# Patient Record
Sex: FEMALE | Race: WHITE | NOT HISPANIC OR LATINO | Employment: FULL TIME | URBAN - METROPOLITAN AREA
[De-identification: names, ages, dates, MRNs, and addresses within clinical notes are randomized per-mention and may not be internally consistent; named-entity substitution may affect disease eponyms.]

---

## 2017-07-09 ENCOUNTER — HOSPITAL ENCOUNTER (EMERGENCY)
Facility: CLINIC | Age: 27
Discharge: HOME | End: 2017-07-09
Attending: EMERGENCY MEDICINE | Admitting: EMERGENCY MEDICINE
Payer: COMMERCIAL

## 2017-07-09 VITALS
TEMPERATURE: 98.3 F | DIASTOLIC BLOOD PRESSURE: 66 MMHG | OXYGEN SATURATION: 98 % | RESPIRATION RATE: 20 BRPM | HEART RATE: 75 BPM | SYSTOLIC BLOOD PRESSURE: 109 MMHG

## 2017-07-09 DIAGNOSIS — R11.2 NON-INTRACTABLE VOMITING WITH NAUSEA, UNSPECIFIED VOMITING TYPE: ICD-10-CM

## 2017-07-09 DIAGNOSIS — T50.905A ADVERSE DRUG REACTION, INITIAL ENCOUNTER: ICD-10-CM

## 2017-07-09 LAB
ALBUMIN UR-MCNC: NEGATIVE MG/DL
ANION GAP SERPL CALCULATED.3IONS-SCNC: 11 MMOL/L (ref 3–14)
APPEARANCE UR: ABNORMAL
BACTERIA #/AREA URNS HPF: ABNORMAL /HPF
BILIRUB UR QL STRIP: NEGATIVE
BUN SERPL-MCNC: 12 MG/DL (ref 7–30)
CALCIUM SERPL-MCNC: 9.3 MG/DL (ref 8.5–10.1)
CHLORIDE SERPL-SCNC: 105 MMOL/L (ref 94–109)
CO2 BLDCOV-SCNC: 23 MMOL/L (ref 21–28)
CO2 SERPL-SCNC: 21 MMOL/L (ref 20–32)
COLOR UR AUTO: YELLOW
CREAT SERPL-MCNC: 0.9 MG/DL (ref 0.52–1.04)
GFR SERPL CREATININE-BSD FRML MDRD: 75 ML/MIN/1.7M2
GLUCOSE SERPL-MCNC: 87 MG/DL (ref 70–99)
GLUCOSE UR STRIP-MCNC: NEGATIVE MG/DL
HCG SERPL QL: NEGATIVE
HGB UR QL STRIP: NEGATIVE
KETONES UR STRIP-MCNC: NEGATIVE MG/DL
LACTATE BLD-SCNC: 1.4 MMOL/L (ref 0.7–2.1)
LACTATE BLD-SCNC: 2.7 MMOL/L (ref 0.7–2.1)
LEUKOCYTE ESTERASE UR QL STRIP: ABNORMAL
MUCOUS THREADS #/AREA URNS LPF: PRESENT /LPF
NITRATE UR QL: NEGATIVE
PCO2 BLDV: 44 MM HG (ref 40–50)
PH BLDV: 7.33 PH (ref 7.32–7.43)
PH UR STRIP: 5 PH (ref 5–7)
PO2 BLDV: 27 MM HG (ref 25–47)
POTASSIUM SERPL-SCNC: 3.9 MMOL/L (ref 3.4–5.3)
RBC #/AREA URNS AUTO: 3 /HPF (ref 0–2)
SAO2 % BLDV FROM PO2: 45 %
SODIUM SERPL-SCNC: 137 MMOL/L (ref 133–144)
SP GR UR STRIP: 1.02 (ref 1–1.03)
SQUAMOUS #/AREA URNS AUTO: 2 /HPF (ref 0–1)
URN SPEC COLLECT METH UR: ABNORMAL
UROBILINOGEN UR STRIP-MCNC: 0 MG/DL (ref 0–2)
WBC #/AREA URNS AUTO: 9 /HPF (ref 0–2)

## 2017-07-09 PROCEDURE — 99284 EMERGENCY DEPT VISIT MOD MDM: CPT | Mod: 25

## 2017-07-09 PROCEDURE — 25000128 H RX IP 250 OP 636: Performed by: EMERGENCY MEDICINE

## 2017-07-09 PROCEDURE — 80048 BASIC METABOLIC PNL TOTAL CA: CPT | Performed by: EMERGENCY MEDICINE

## 2017-07-09 PROCEDURE — 83605 ASSAY OF LACTIC ACID: CPT | Mod: 91 | Performed by: EMERGENCY MEDICINE

## 2017-07-09 PROCEDURE — 81001 URINALYSIS AUTO W/SCOPE: CPT | Performed by: EMERGENCY MEDICINE

## 2017-07-09 PROCEDURE — 84703 CHORIONIC GONADOTROPIN ASSAY: CPT | Performed by: EMERGENCY MEDICINE

## 2017-07-09 PROCEDURE — 96374 THER/PROPH/DIAG INJ IV PUSH: CPT

## 2017-07-09 PROCEDURE — 82803 BLOOD GASES ANY COMBINATION: CPT

## 2017-07-09 PROCEDURE — 83605 ASSAY OF LACTIC ACID: CPT

## 2017-07-09 PROCEDURE — 96361 HYDRATE IV INFUSION ADD-ON: CPT

## 2017-07-09 RX ORDER — ONDANSETRON 4 MG/1
4 TABLET, ORALLY DISINTEGRATING ORAL EVERY 8 HOURS PRN
Qty: 10 TABLET | Refills: 0 | Status: SHIPPED | OUTPATIENT
Start: 2017-07-09 | End: 2017-07-12

## 2017-07-09 RX ORDER — ONDANSETRON 2 MG/ML
4 INJECTION INTRAMUSCULAR; INTRAVENOUS
Status: COMPLETED | OUTPATIENT
Start: 2017-07-09 | End: 2017-07-09

## 2017-07-09 RX ORDER — SODIUM CHLORIDE 9 MG/ML
1000 INJECTION, SOLUTION INTRAVENOUS CONTINUOUS
Status: DISCONTINUED | OUTPATIENT
Start: 2017-07-09 | End: 2017-07-09 | Stop reason: HOSPADM

## 2017-07-09 RX ORDER — LIDOCAINE 40 MG/G
CREAM TOPICAL
Status: DISCONTINUED | OUTPATIENT
Start: 2017-07-09 | End: 2017-07-09 | Stop reason: HOSPADM

## 2017-07-09 RX ADMIN — SODIUM CHLORIDE 1000 ML: 9 INJECTION, SOLUTION INTRAVENOUS at 09:23

## 2017-07-09 RX ADMIN — ONDANSETRON 4 MG: 2 INJECTION INTRAMUSCULAR; INTRAVENOUS at 09:27

## 2017-07-09 ASSESSMENT — ENCOUNTER SYMPTOMS
VOMITING: 1
NAUSEA: 1
ABDOMINAL PAIN: 1

## 2017-07-09 NOTE — DISCHARGE INSTRUCTIONS
Discharge Instructions  Vomiting    You have been seen today for vomiting. This is usually caused by a virus, but some bacteria, parasites, medicines or other medical conditions can cause similar symptoms. At this time your doctor does not find that your vomiting is a sign of anything dangerous or life-threatening. However, sometimes the signs of serious illness do not show up right away. If you have new or worse symptoms, you may need to be seen again in the emergency department or by your primary doctor. Remember that serious problems like appendicitis can start as vomiting.     Return to the Emergency Department if:    You keep throwing up and you are not able to keep liquids down.     You feel you are getting dehydrated, such as being very thirsty, not urinating at least every 8-12 hours, or feeling faint or lightheaded.     You develop a new fever, or your fever continues for more than 2 days.     You have belly pain that seems worse than cramps, is in one spot, or is getting worse over time.     You have blood in your vomit or stools.     You feel very weak.    You are not starting to improve within 24 hours of your visit here.     What can I do to help myself?    The most important thing to do is to drink clear liquids. If you have been vomiting a lot, it is best to have only small, frequent sips of liquids. Drinking too much at once may cause more vomiting. If you are vomiting often, you must replace minerals, sodium and potassium lost with your illness. Pedialyte  and sports drinks can help you replace these minerals. You can also drink clear liquids such as water, weak tea, apple juice, and 7-Up . Avoid acid liquids (orange), caffeine (coffee) or alcohol. Do not drink milk until you no longer have diarrhea.     After liquids are staying down, you may start eating mild foods. Soda crackers, toast, plain noodles, gelatin, applesauce and bananas are good first choices. Avoid foods that have acid, are spicy,  fatty or have a lot of fiber (such as meats, coarse grains, vegetables). You may start eating these foods again in about 3 days when you are better.     Sometimes treatment includes prescription medicine to prevent nausea and vomiting. If your doctor prescribes these for you, take them as directed.     Don t take ibuprofen, or other nonsteroidal anti-inflammatory medicines without checking with your healthcare provider.   If you were given a prescription for medicine here today, be sure to read all of the information (including the package insert) that comes with your prescription.  This will include important information about the medicine, its side effects, and any warnings that you need to know about.  The pharmacist who fills the prescription can provide more information and answer questions you may have about the medicine.  If you have questions or concerns that the pharmacist cannot address, please call or return to the Emergency Department.       Opioid Medication Information    Pain medications are among the most commonly prescribed medicines, so we are including this information for all our patients. If you did not receive pain medication or get a prescription for pain medicine, you can ignore it.     You may have been given a prescription for an opioid (narcotic) pain medicine and/or have received a pain medicine while here in the Emergency Department. These medicines can make you drowsy or impaired. You must not drive, operate dangerous equipment, or engage in any other dangerous activities while taking these medications. If you drive while taking these medications, you could be arrested for DUI, or driving under the influence. Do not drink any alcohol while you are taking these medications.     Opioid pain medications can cause addiction. If you have a history of chemical dependency of any type, you are at a higher risk of becoming addicted to pain medications.  Only take these prescribed medications to  treat your pain when all other options have been tried. Take it for as short a time and as few doses as possible. Store your pain pills in a secure place, as they are frequently stolen and provide a dangerous opportunity for children or visitors in your house to start abusing these powerful medications. We will not replace any lost or stolen medicine.  As soon as your pain is better, you should flush all your remaining medication.     Many prescription pain medications contain Tylenol  (acetaminophen), including Vicodin , Tylenol #3 , Norco , Lortab , and Percocet .  You should not take any extra pills of Tylenol  if you are using these prescription medications or you can get very sick.  Do not ever take more than 3000 mg of acetaminophen in any 24 hour period.    All opioids tend to cause constipation. Drink plenty of water and eat foods that have a lot of fiber, such as fruits, vegetables, prune juice, apple juice and high fiber cereal.  Take a laxative if you don t move your bowels at least every other day. Miralax , Milk of Magnesia, Colace , or Senna  can be used to keep you regular.      Remember that you can always come back to the Emergency Department if you are not able to see your regular doctor in the amount of time listed above, if you get any new symptoms, or if there is anything that worries you.    DISCONTINUE BACTRIM.

## 2017-07-09 NOTE — ED AVS SNAPSHOT
[facility] Emergency Department    [address]    [facility] [address]    Phone:  [phone]    Fax:  [phone]                                       Karen Buenrostro   MRN: [id number]    Department:  [facility] Emergency Department   Date of Visit:  7/9/2017           After Visit Summary Signature Page     I have received my discharge instructions, and my questions have been answered. I have discussed any challenges I see with this plan with the nurse or doctor.    ..........................................................................................................................................  Patient/Patient Representative Signature      ..........................................................................................................................................  Patient Representative Print Name and Relationship to Patient    ..................................................               ................................................  Date                                            Time    ..........................................................................................................................................  Reviewed by Signature/Title    ...................................................              ..............................................  Date                                                            Time

## 2017-07-09 NOTE — ED AVS SNAPSHOT
[facility] Emergency Department    [address] [place] [address]    Phone:  [phone]    Fax:  [phone]                                       Karen Buenrostro   MRN: [id number]    Department:  [facility] Emergency Department   Date of Visit:  7/9/2017           Patient Information     Date Of Birth          1990        Your diagnoses for this visit were:     Non-intractable vomiting with nausea, unspecified vomiting type     Adverse drug reaction, initial encounter        You were seen by Charan Steward MD.      Follow-up Information     Follow up with Ashley Head MD In 3 days.    Specialties:  Internal Medicine, Pediatrics    Why:  As needed    Contact information:    [facility] [address] [place] [address]  [phone]          Discharge Instructions         Discharge Instructions  Vomiting    You have been seen today for vomiting. This is usually caused by a virus, but some bacteria, parasites, medicines or other medical conditions can cause similar symptoms. At this time your doctor does not find that your vomiting is a sign of anything dangerous or life-threatening. However, sometimes the signs of serious illness do not show up right away. If you have new or worse symptoms, you may need to be seen again in the emergency department or by your primary doctor. Remember that serious problems like appendicitis can start as vomiting.     Return to the Emergency Department if:    You keep throwing up and you are not able to keep liquids down.     You feel you are getting dehydrated, such as being very thirsty, not urinating at least every 8-12 hours, or feeling faint or lightheaded.     You develop a new fever, or your fever continues for more than 2 days.     You have belly pain that seems worse than cramps, is in one spot, or is getting worse over time.     You have blood in your vomit or stools.     You feel very  weak.    You are not starting to improve within 24 hours of your visit here.     What can I do to help myself?    The most important thing to do is to drink clear liquids. If you have been vomiting a lot, it is best to have only small, frequent sips of liquids. Drinking too much at once may cause more vomiting. If you are vomiting often, you must replace minerals, sodium and potassium lost with your illness. Pedialyte  and sports drinks can help you replace these minerals. You can also drink clear liquids such as water, weak tea, apple juice, and 7-Up . Avoid acid liquids (orange), caffeine (coffee) or alcohol. Do not drink milk until you no longer have diarrhea.     After liquids are staying down, you may start eating mild foods. Soda crackers, toast, plain noodles, gelatin, applesauce and bananas are good first choices. Avoid foods that have acid, are spicy, fatty or have a lot of fiber (such as meats, coarse grains, vegetables). You may start eating these foods again in about 3 days when you are better.     Sometimes treatment includes prescription medicine to prevent nausea and vomiting. If your doctor prescribes these for you, take them as directed.     Don t take ibuprofen, or other nonsteroidal anti-inflammatory medicines without checking with your healthcare provider.   If you were given a prescription for medicine here today, be sure to read all of the information (including the package insert) that comes with your prescription.  This will include important information about the medicine, its side effects, and any warnings that you need to know about.  The pharmacist who fills the prescription can provide more information and answer questions you may have about the medicine.  If you have questions or concerns that the pharmacist cannot address, please call or return to the Emergency Department.       Opioid Medication Information    Pain medications are among the most commonly prescribed medicines, so we  are including this information for all our patients. If you did not receive pain medication or get a prescription for pain medicine, you can ignore it.     You may have been given a prescription for an opioid (narcotic) pain medicine and/or have received a pain medicine while here in the Emergency Department. These medicines can make you drowsy or impaired. You must not drive, operate dangerous equipment, or engage in any other dangerous activities while taking these medications. If you drive while taking these medications, you could be arrested for DUI, or driving under the influence. Do not drink any alcohol while you are taking these medications.     Opioid pain medications can cause addiction. If you have a history of chemical dependency of any type, you are at a higher risk of becoming addicted to pain medications.  Only take these prescribed medications to treat your pain when all other options have been tried. Take it for as short a time and as few doses as possible. Store your pain pills in a secure place, as they are frequently stolen and provide a dangerous opportunity for children or visitors in your house to start abusing these powerful medications. We will not replace any lost or stolen medicine.  As soon as your pain is better, you should flush all your remaining medication.     Many prescription pain medications contain Tylenol  (acetaminophen), including Vicodin , Tylenol #3 , Norco , Lortab , and Percocet .  You should not take any extra pills of Tylenol  if you are using these prescription medications or you can get very sick.  Do not ever take more than 3000 mg of acetaminophen in any 24 hour period.    All opioids tend to cause constipation. Drink plenty of water and eat foods that have a lot of fiber, such as fruits, vegetables, prune juice, apple juice and high fiber cereal.  Take a laxative if you don t move your bowels at least every other day. Miralax , Milk of Magnesia, Colace , or Senna   can be used to keep you regular.      Remember that you can always come back to the Emergency Department if you are not able to see your regular doctor in the amount of time listed above, if you get any new symptoms, or if there is anything that worries you.    DISCONTINUE BACTRIM.    24 Hour Appointment Hotline       To make an appointment at any [facility], call [phone] ([phone]). If you don't have a family doctor or clinic, we will help you find one. [facility] are conveniently located to serve the needs of you and your family.             Review of your medicines      START taking        Dose / Directions Last dose taken    ondansetron 4 MG ODT tab   Commonly known as:  ZOFRAN ODT   Dose:  4 mg   Quantity:  10 tablet        Take 1 tablet (4 mg) by mouth every 8 hours as needed for nausea   Refills:  0          Our records show that you are taking the medicines listed below. If these are incorrect, please call your family doctor or clinic.        Dose / Directions Last dose taken    AMETHIA 0.15-0.03 &0.01 MG per tablet   Dose:  1 tablet   Quantity:  91 tablet   Generic drug:  levonorgest-eth estrad 91-Day        Take 1 tablet by mouth daily   Refills:  3        BACTRIM PO        Refills:  0        omeprazole-sodium bicarbonate  MG Caps per capsule   Dose:  1 capsule        Take 1 capsule by mouth daily   Refills:  0                Prescriptions were sent or printed at these locations (1 Prescription)                   Other Prescriptions                Printed at Department/Unit printer (1 of 1)         ondansetron (ZOFRAN ODT) 4 MG ODT tab                Procedures and tests performed during your visit     Basic metabolic panel (BMP)    HCG QUALitative pregnancy (blood)    ISTAT CG4 gases lactate madhu nursing POCT    ISTAT gases lactate madhu POCT    Lactic acid whole blood    Patient care order    Peripheral IV: Standard    UA with Microscopic      Orders Needing Specimen  Collection     None      Pending Results     No orders found from 7/7/2017 to 7/10/2017.            Pending Culture Results     No orders found from 7/7/2017 to 7/10/2017.            Pending Results Instructions     If you had any lab results that were not finalized at the time of your Discharge, you can call the ED Lab Result RN at [phone]. You will be contacted by this team for any positive Lab results or changes in treatment. The nurses are available 7 days a week from 10A to 6:30P.  You can leave a message 24 hours per day and they will return your call.        Test Results From Your Hospital Stay        7/9/2017  9:59 AM      Component Results     Component Value Ref Range & Units Status    HCG Qualitative Serum Negative NEG Final         7/9/2017 11:06 AM      Component Results     Component Value Ref Range & Units Status    Sodium 137 133 - 144 mmol/L Final    Potassium 3.9 3.4 - 5.3 mmol/L Final    Chloride 105 94 - 109 mmol/L Final    Carbon Dioxide 21 20 - 32 mmol/L Final    Anion Gap 11 3 - 14 mmol/L Final    Glucose 87 70 - 99 mg/dL Final    Urea Nitrogen 12 7 - 30 mg/dL Final    Creatinine 0.90 0.52 - 1.04 mg/dL Final    GFR Estimate 75 >60 mL/min/1.7m2 Final    Non  GFR Calc    GFR Estimate If Black >90   GFR Calc   >60 mL/min/1.7m2 Final    Calcium 9.3 8.5 - 10.1 mg/dL Final         7/9/2017  9:48 AM      Component Results     Component Value Ref Range & Units Status    Lactic Acid 2.7 (H) 0.7 - 2.1 mmol/L Final         7/9/2017  9:59 AM      Component Results     Component Value Ref Range & Units Status    Color Urine Yellow  Final    Appearance Urine Slightly Cloudy  Final    Glucose Urine Negative NEG mg/dL Final    Bilirubin Urine Negative NEG Final    Ketones Urine Negative NEG mg/dL Final    Specific Gravity Urine 1.024 1.003 - 1.035 Final    Blood Urine Negative NEG Final    pH Urine 5.0 5.0 - 7.0 pH Final    Protein Albumin Urine Negative NEG mg/dL Final     Urobilinogen mg/dL 0.0 0.0 - 2.0 mg/dL Final    Nitrite Urine Negative NEG Final    Leukocyte Esterase Urine Small (A) NEG Final    Source Midstream Urine  Final    WBC Urine 9 (H) 0 - 2 /HPF Final    RBC Urine 3 (H) 0 - 2 /HPF Final    Bacteria Urine Few (A) NEG /HPF Final    Squamous Epithelial /HPF Urine 2 (H) 0 - 1 /HPF Final    Mucous Urine Present (A) NEG /LPF Final               7/9/2017 12:21 PM      Component Results     Component Value Ref Range & Units Status    Ph Venous 7.33 7.32 - 7.43 pH Final    PCO2 Venous 44 40 - 50 mm Hg Final    PO2 Venous 27 25 - 47 mm Hg Final    Bicarbonate Venous 23 21 - 28 mmol/L Final    O2 Sat Venous 45 % Final    Lactic Acid 1.4 0.7 - 2.1 mmol/L Final                Clinical Quality Measure: Blood Pressure Screening     Your blood pressure was checked while you were in the emergency department today. The last reading we obtained was  BP: 111/65 . Please read the guidelines below about what these numbers mean and what you should do about them.  If your systolic blood pressure (the top number) is less than 120 and your diastolic blood pressure (the bottom number) is less than 80, then your blood pressure is normal. There is nothing more that you need to do about it.  If your systolic blood pressure (the top number) is 120-139 or your diastolic blood pressure (the bottom number) is 80-89, your blood pressure may be higher than it should be. You should have your blood pressure rechecked within a year by a primary care provider.  If your systolic blood pressure (the top number) is 140 or greater or your diastolic blood pressure (the bottom number) is 90 or greater, you may have high blood pressure. High blood pressure is treatable, but if left untreated over time it can put you at risk for heart attack, stroke, or kidney failure. You should have your blood pressure rechecked by a primary care provider within the next 4 weeks.  If your provider in the emergency department today  "gave you specific instructions to follow-up with your doctor or provider even sooner than that, you should follow that instruction and not wait for up to 4 weeks for your follow-up visit.        Thank you for choosing [facility]       Thank you for choosing [facility] for your care. Our goal is always to provide you with excellent care. Hearing back from our patients is one way we can continue to improve our services. Please take a few minutes to complete the written survey that you may receive in the mail after you visit with us. Thank you!        [employer] Information     [employer] lets you send messages to your doctor, view your test results, renew your prescriptions, schedule appointments and more. To sign up, go to [email or web address] . Click on \"Log in\" on the left side of the screen, which will take you to the Welcome page. Then click on \"Sign up Now\" on the right side of the page.     You will be asked to enter the access code listed below, as well as some personal information. Please follow the directions to create your username and password.     Your access code is: [email or web address]  Expires: 10/7/2017  1:01 PM     Your access code will  in 90 days. If you need help or a new code, please call your [facility] clinic or [phone].        Care EveryWhere ID     This is your Care EveryWhere ID. This could be used by other organizations to access your [facility] medical records  [id number]        Equal Access to Services     TRAMAINE SINGH : Hadii leigh Armenta, waaxda meggan, qaybta denisalerica vázquez . So [facility] [phone].    ATENCIÓN: Si habla español, tiene a johnson disposición servicios gratuitos de asistencia lingüística. Llame al [phone].    We comply with applicable federal civil rights laws and [place] laws. We do not discriminate on the basis of race, color, national origin, age, disability sex, sexual orientation or gender identity.          "   After Visit Summary       This is your record. Keep this with you and show to your community pharmacist(s) and doctor(s) at your next visit.

## 2017-07-09 NOTE — ED PROVIDER NOTES
History     Chief Complaint:  Abdominal pain    HPI   Karen Buenrostro is a 26 year old female, with a history of C difficile, duodenitis, and fatty liver, who presents with abdominal pain. The patient states she was seen on 7/5 for a yeast infection at Urgent Care at [facility] [place]. Her urine test at Urgent Care showed elevated RBC, where she was prescribed Bactrim as a precaution, and a urine culture was plated, but she had not heard the results. Since taking the Bactrim, she has vomiting episodes after her dosage, which has continued on to today, prompting her ED visit. She is unable to keep fluids and solids down and now has continuous nausea. She endorses abdominal pain after her vomiting episodes, which come on 10-20 minutes after.    Per chart review, the patient's urine culture showed ,000 col/mL Multiple Bacterial Morphologies.    Allergies:  NKDA     Medications:    Bactrim  Omeprazole  Amentia    Past Medical History:    C difficile colitis  Duodenitis   Fatty liver  Post concussion syndrome    Past Surgical History:    The patient does not have any pertinent past surgical history.     Family History:    No past pertinent family history.     Social History:  Positive for alcohol use.   Negative for tobacco use.   Marital Status:  Single [1]     Review of Systems   Gastrointestinal: Positive for abdominal pain, nausea and vomiting.   All other systems reviewed and are negative.      Physical Exam   First Vitals:  BP: 121/67  Pulse: 89  Temp: 98.3  F (36.8  C)  Resp: 20  SpO2: 97 %      Physical Exam  General: Appears slightly uncomfortable due to nausea  HEENT:   The scalp and head appear normal    The pupils are equal, round, and reactive to light    Extraocular muscles are intact.    The nose is normal.    The oropharynx is normal.      Uvula is in the midline.  There is no peritonsillar abscess.  Neck:  Normal range of motion.    Lungs:  Clear.      No rales, no wheezing.      There  is no tachypnea.  Non-labored.  Cardiac: Regular rate.      Normal S1 and S2.      No S3 or S4.      No pathological murmur.      No pericardial rub.  Abdomen/GI: Soft. No distension. No tympani. No rebound. No localized abdominal    tenderness. Decreased bowel sounds through out. No CVA tenderness  Lymph: No anterior or posterior cervical lymphadenopathy noted.  MS:  Normal tone.      Normal movement of all extremities.    Neuro:  Normal mentation.  No focal motor or sensory changes.      Speech normal.  Psych:  Awake.     Alert.      Normal affect.      Appropriate interactions.  Skin:  No rash.      No lesions.      Emergency Department Course     Laboratory:  BMP: all WNL (Creatinine: 0.90)    UA with micro: Small leukocyte esterase, few bacteria, mucous present, WBC: 9(H), RBC: 3(H), Squamous Epithelial: 2(H), o/w negative    Lactic acid: 2.7(H)  HCG qualitative: Negative     Interventions:  0927 Zofran, 4 mg, IV     Emergency Department Course:  Nursing notes and vitals reviewed. I performed an exam of the patient as documented above.     IV inserted. Medicine administered as documented above. Blood drawn. This was sent to the lab for further testing, results above. The patient provided a urine sample here in the emergency department. This was sent for laboratory testing, findings above.      1257 I rechecked the patient and discussed the results of their workup thus far. She was able to drink 10 oz and keep it down. She feels a lot better than upon arrival.     Findings and plan explained to the Patient. Patient discharged home with instructions regarding supportive care, medications, and reasons to return. The importance of close follow-up was reviewed. The patient was prescribed Zofran    I personally reviewed the laboratory results with the Patient and answered all related questions prior to discharge.      Impression & Plan      Medical Decision Making:  Karen Buenrostro is a 26 year old female who  presents with vomiting after taking Bactrim. She states that within 60 minutes she would get extreme nausea and vomiting. She would feel better as the day would go one, but take her subsequent dose in the evening and had the same reaction.    I did look up her results of urinalysis which only showed 3 red cells, but no increase in white cells and a few bacteria and she was not having urinary symptoms, such as burning or frequency. She has had bladder infections and she knows what they feel like. She went to the doctor, initially, for a yeast infection and did get a diagnosed with that and treated appropriately with Diflucan. She said the yeast is nearly resolved at this point.   We did check labs and her lactate was elevated which I feel is due to dehydration and normalized with IV fluids. She was able to drink oral fluids here and keep them down without issue.    The urine culture from the outpatient clinic showed  polymicrobial findings with no single organism identified, therefore sensitivities were not done. I am recommending the patient discontinue Bactrim as I do not think she has a UTI. It sounds pretty clear that this is the cause and effect related to the Bactrim.    That being said, I would like the patient to use Zofran as directed until the nausea is fully resolved. She still has some slight nausea. She should increase her clear liquid intake and within 3 days and if her nausea is not fully resolved, she should follow up with her PCP. Likewise, if she develops new or worsening symptoms, she should return here.       Diagnosis:    ICD-10-CM   1. Non-intractable vomiting with nausea, unspecified vomiting type R11.2   2. Adverse drug reaction, initial encounter T88.7XXA       Disposition:  discharged to home    Discharge Medications:  New Prescriptions    ONDANSETRON (ZOFRAN ODT) 4 MG ODT TAB    Take 1 tablet (4 mg) by mouth every 8 hours as needed for nausea     I, Flor Chaparro, am serving as a scribe on  7/9/2017 at 8:57 AM to personally document services performed by Charan Steward MD based on my observations and the provider's statements to me.      Flor Chaparro  7/9/2017   [facility] EMERGENCY DEPARTMENT       Charan Steward MD  07/09/17 1440

## 2017-07-09 NOTE — ED NOTES
"Pt started antibiotics (bactrim) on Wednesday for UTI.  Pt thinks the antibiotics are making her sick.  Pt c/o abdominal pain and vomiting while on bactrim.  Pt states \" i feel fine when I dont take the medication\"  But as soon as she takes the med, she feels sick again. Pt does c/o some sob as well.  "

## 2017-08-16 ENCOUNTER — HOSPITAL ENCOUNTER (EMERGENCY)
Facility: CLINIC | Age: 27
Discharge: HOME | End: 2017-08-16
Attending: EMERGENCY MEDICINE | Admitting: EMERGENCY MEDICINE
Payer: COMMERCIAL

## 2017-08-16 ENCOUNTER — APPOINTMENT (OUTPATIENT)
Dept: CT IMAGING | Facility: CLINIC | Age: 27
End: 2017-08-16
Attending: EMERGENCY MEDICINE
Payer: COMMERCIAL

## 2017-08-16 VITALS
SYSTOLIC BLOOD PRESSURE: 137 MMHG | HEART RATE: 78 BPM | DIASTOLIC BLOOD PRESSURE: 86 MMHG | OXYGEN SATURATION: 100 % | RESPIRATION RATE: 16 BRPM | TEMPERATURE: 98.6 F

## 2017-08-16 DIAGNOSIS — K57.32 DIVERTICULITIS OF COLON: Primary | ICD-10-CM

## 2017-08-16 LAB
ALBUMIN SERPL-MCNC: 3.3 G/DL (ref 3.4–5)
ALBUMIN UR-MCNC: NEGATIVE MG/DL
ALP SERPL-CCNC: 100 U/L (ref 40–150)
ALT SERPL W P-5'-P-CCNC: 26 U/L (ref 0–50)
ANION GAP SERPL CALCULATED.3IONS-SCNC: 8 MMOL/L (ref 3–14)
APPEARANCE UR: CLEAR
AST SERPL W P-5'-P-CCNC: 16 U/L (ref 0–45)
BASOPHILS # BLD AUTO: 0 10E9/L (ref 0–0.2)
BASOPHILS NFR BLD AUTO: 0.2 %
BILIRUB SERPL-MCNC: 0.3 MG/DL (ref 0.2–1.3)
BILIRUB UR QL STRIP: NEGATIVE
BUN SERPL-MCNC: 11 MG/DL (ref 7–30)
CALCIUM SERPL-MCNC: 8.2 MG/DL (ref 8.5–10.1)
CHLORIDE SERPL-SCNC: 108 MMOL/L (ref 94–109)
CO2 SERPL-SCNC: 26 MMOL/L (ref 20–32)
COLOR UR AUTO: YELLOW
CREAT SERPL-MCNC: 0.62 MG/DL (ref 0.52–1.04)
DIFFERENTIAL METHOD BLD: ABNORMAL
EOSINOPHIL # BLD AUTO: 0.1 10E9/L (ref 0–0.7)
EOSINOPHIL NFR BLD AUTO: 1 %
ERYTHROCYTE [DISTWIDTH] IN BLOOD BY AUTOMATED COUNT: 12.2 % (ref 10–15)
GFR SERPL CREATININE-BSD FRML MDRD: >90 ML/MIN/1.7M2
GLUCOSE SERPL-MCNC: 96 MG/DL (ref 70–99)
GLUCOSE UR STRIP-MCNC: NEGATIVE MG/DL
HCG UR QL: NEGATIVE
HCT VFR BLD AUTO: 38.8 % (ref 35–47)
HEMOCCULT STL QL: NEGATIVE
HGB BLD-MCNC: 13.3 G/DL (ref 11.7–15.7)
HGB UR QL STRIP: NEGATIVE
IMM GRANULOCYTES # BLD: 0.1 10E9/L (ref 0–0.4)
IMM GRANULOCYTES NFR BLD: 0.4 %
KETONES UR STRIP-MCNC: NEGATIVE MG/DL
LEUKOCYTE ESTERASE UR QL STRIP: NEGATIVE
LIPASE SERPL-CCNC: 178 U/L (ref 73–393)
LYMPHOCYTES # BLD AUTO: 2.1 10E9/L (ref 0.8–5.3)
LYMPHOCYTES NFR BLD AUTO: 15.2 %
MCH RBC QN AUTO: 30 PG (ref 26.5–33)
MCHC RBC AUTO-ENTMCNC: 34.3 G/DL (ref 31.5–36.5)
MCV RBC AUTO: 88 FL (ref 78–100)
MONOCYTES # BLD AUTO: 1.1 10E9/L (ref 0–1.3)
MONOCYTES NFR BLD AUTO: 8.1 %
MUCOUS THREADS #/AREA URNS LPF: PRESENT /LPF
NEUTROPHILS # BLD AUTO: 10.4 10E9/L (ref 1.6–8.3)
NEUTROPHILS NFR BLD AUTO: 75.1 %
NITRATE UR QL: NEGATIVE
NRBC # BLD AUTO: 0 10*3/UL
NRBC BLD AUTO-RTO: 0 /100
PH UR STRIP: 6 PH (ref 5–7)
PLATELET # BLD AUTO: 192 10E9/L (ref 150–450)
POTASSIUM SERPL-SCNC: 3.9 MMOL/L (ref 3.4–5.3)
PROT SERPL-MCNC: 6.6 G/DL (ref 6.8–8.8)
RBC # BLD AUTO: 4.43 10E12/L (ref 3.8–5.2)
RBC #/AREA URNS AUTO: <1 /HPF (ref 0–2)
SODIUM SERPL-SCNC: 142 MMOL/L (ref 133–144)
SOURCE: ABNORMAL
SP GR UR STRIP: 1.01 (ref 1–1.03)
SQUAMOUS #/AREA URNS AUTO: 1 /HPF (ref 0–1)
UROBILINOGEN UR STRIP-MCNC: 0 MG/DL (ref 0–2)
WBC # BLD AUTO: 13.8 10E9/L (ref 4–11)
WBC #/AREA URNS AUTO: <1 /HPF (ref 0–2)

## 2017-08-16 PROCEDURE — 36415 COLL VENOUS BLD VENIPUNCTURE: CPT | Performed by: EMERGENCY MEDICINE

## 2017-08-16 PROCEDURE — 96375 TX/PRO/DX INJ NEW DRUG ADDON: CPT

## 2017-08-16 PROCEDURE — 96374 THER/PROPH/DIAG INJ IV PUSH: CPT

## 2017-08-16 PROCEDURE — 85025 COMPLETE CBC W/AUTO DIFF WBC: CPT | Performed by: EMERGENCY MEDICINE

## 2017-08-16 PROCEDURE — 96361 HYDRATE IV INFUSION ADD-ON: CPT

## 2017-08-16 PROCEDURE — 83690 ASSAY OF LIPASE: CPT | Performed by: EMERGENCY MEDICINE

## 2017-08-16 PROCEDURE — 81025 URINE PREGNANCY TEST: CPT | Performed by: EMERGENCY MEDICINE

## 2017-08-16 PROCEDURE — 25000128 H RX IP 250 OP 636: Performed by: EMERGENCY MEDICINE

## 2017-08-16 PROCEDURE — 80053 COMPREHEN METABOLIC PANEL: CPT | Performed by: EMERGENCY MEDICINE

## 2017-08-16 PROCEDURE — 82272 OCCULT BLD FECES 1-3 TESTS: CPT | Performed by: EMERGENCY MEDICINE

## 2017-08-16 PROCEDURE — 74177 CT ABD & PELVIS W/CONTRAST: CPT

## 2017-08-16 PROCEDURE — 81001 URINALYSIS AUTO W/SCOPE: CPT | Performed by: EMERGENCY MEDICINE

## 2017-08-16 PROCEDURE — 99285 EMERGENCY DEPT VISIT HI MDM: CPT | Mod: 25

## 2017-08-16 RX ORDER — IOPAMIDOL 755 MG/ML
500 INJECTION, SOLUTION INTRAVASCULAR ONCE
Status: COMPLETED | OUTPATIENT
Start: 2017-08-16 | End: 2017-08-16

## 2017-08-16 RX ORDER — ONDANSETRON 2 MG/ML
4 INJECTION INTRAMUSCULAR; INTRAVENOUS EVERY 30 MIN PRN
Status: DISCONTINUED | OUTPATIENT
Start: 2017-08-16 | End: 2017-08-16 | Stop reason: HOSPADM

## 2017-08-16 RX ORDER — METRONIDAZOLE 500 MG/1
500 TABLET ORAL 3 TIMES DAILY
Qty: 30 TABLET | Refills: 0 | Status: ON HOLD | OUTPATIENT
Start: 2017-08-16 | End: 2017-08-19

## 2017-08-16 RX ORDER — CIPROFLOXACIN 500 MG/1
500 TABLET, FILM COATED ORAL 2 TIMES DAILY
Qty: 20 TABLET | Refills: 0 | Status: ON HOLD | OUTPATIENT
Start: 2017-08-16 | End: 2017-08-19

## 2017-08-16 RX ORDER — HYDROMORPHONE HYDROCHLORIDE 1 MG/ML
0.5 INJECTION, SOLUTION INTRAMUSCULAR; INTRAVENOUS; SUBCUTANEOUS
Status: DISCONTINUED | OUTPATIENT
Start: 2017-08-16 | End: 2017-08-16 | Stop reason: HOSPADM

## 2017-08-16 RX ORDER — OXYCODONE AND ACETAMINOPHEN 5; 325 MG/1; MG/1
1-2 TABLET ORAL EVERY 6 HOURS PRN
Qty: 20 TABLET | Refills: 0 | Status: SHIPPED | OUTPATIENT
Start: 2017-08-16 | End: 2017-09-26

## 2017-08-16 RX ORDER — SODIUM CHLORIDE 9 MG/ML
1000 INJECTION, SOLUTION INTRAVENOUS CONTINUOUS
Status: DISCONTINUED | OUTPATIENT
Start: 2017-08-16 | End: 2017-08-16 | Stop reason: HOSPADM

## 2017-08-16 RX ADMIN — SODIUM CHLORIDE 65 ML: 9 INJECTION, SOLUTION INTRAVENOUS at 17:55

## 2017-08-16 RX ADMIN — Medication 0.5 MG: at 16:35

## 2017-08-16 RX ADMIN — SODIUM CHLORIDE 1000 ML: 9 INJECTION, SOLUTION INTRAVENOUS at 16:31

## 2017-08-16 RX ADMIN — ONDANSETRON 4 MG: 2 INJECTION INTRAMUSCULAR; INTRAVENOUS at 16:33

## 2017-08-16 RX ADMIN — IOPAMIDOL 100 ML: 755 INJECTION, SOLUTION INTRAVENOUS at 17:55

## 2017-08-16 ASSESSMENT — ENCOUNTER SYMPTOMS
NAUSEA: 1
CHEST TIGHTNESS: 0
DIARRHEA: 0
COUGH: 0
FEVER: 0
CHILLS: 0
SHORTNESS OF BREATH: 0
BLOOD IN STOOL: 1
DIAPHORESIS: 0
ABDOMINAL PAIN: 1

## 2017-08-16 NOTE — ED AVS SNAPSHOT
[facility] Emergency Department    [address]    [facility] [address]    Phone:  [phone]    Fax:  [phone]                                       Karen Buenrostro   MRN: [id number]    Department:  [facility] Emergency Department   Date of Visit:  8/16/2017           Patient Information     Date Of Birth          1990        Your diagnoses for this visit were:     Diverticulitis of colon        You were seen by Danny Cosme MD.      Follow-up Information     Follow up with Ashley Head MD. Schedule an appointment as soon as possible for a visit in 2 days.    Specialties:  Internal Medicine, Pediatrics    Why:  For re-check    Contact information:    [facility] [place] [address]  [phone]          Follow up with [facility] Emergency Department.    Specialty:  EMERGENCY MEDICINE    Why:  If symptoms worsen    Contact information:    [address] [place] [id number] [phone]        Discharge Instructions         Discharge Instructions for Diverticulitis  You have been diagnosed with diverticulitis. This is a condition in which small pouches form in your colon (large intestine) and become inflamed or infected. Follow the guidelines below for home care.  As you recover  Tips for recovery include:    Eat a low-fiber diet. Your healthcare provider may advise a liquid diet. This gives your bowel a chance to rest so that it can recover.    Foods to include: flake cereal, mashed potatoes, pancakes, waffles, pasta, white bread, rice, applesauce, bananas, eggs, fish, poultry, tofu, and well-cooked vegetables    Take your medicines as directed. Do not stop taking the medicines, even if you feel better.    Monitor your temperature and report any rising temperature to your healthcare provider.    Take antibiotics exactly as directed. Do not miss any and keep taking them even if you feel better.     Drink 6 to  8 glasses of water every day, unless directed otherwise.    Use a heating pad or hot water bottle to reduce abdominal cramping or pain.  Preventing diverticulitis in the future  Tips for prevention include:    Eat a high-fiber diet. Fiber adds bulk to the stool so that it passes through the large intestine more easily.    Keep drinking 6 to 8 glasses of water every day, unless directed otherwise.    Begin an exercise program. Ask your healthcare provider how to get started. You can benefit from simple activities such as walking or gardening.    Treat diarrhea with a bland diet. Start with liquids only, then slowly add fiber over time.    Watch for changes in your bowel movements (constipation to diarrhea).    Avoid constipation with fiber and add a stool softener if needed.     Get plenty of rest and sleep.  Follow-up care  Make a follow-up appointment as directed by our staff.  When to call your healthcare provider  Call your healthcare provider immediately if you have any of the following:    Fever of 100.4 F (38.0 C) or higher, or as directed by your healthcare provider    Chills    Severe cramps in the belly, most commonly the lower left side    Tenderness in the belly, most commonly the lower left side    Nausea and vomiting    Bleeding from your rectum   Date Last Reviewed: 7/1/2016 2000-2017 [employer]. [address] [place] [address], [place] [address]. All rights reserved. This information is not intended as a substitute for professional medical care. Always follow your healthcare professional's instructions.          24 Hour Appointment Hotline       To make an appointment at any [facility], call [phone] ([phone]). If you don't have a family doctor or clinic, we will help you find one. [facility] are conveniently located to serve the needs of you and your family.             Review of your medicines      START taking        Dose / Directions Last dose taken     ciprofloxacin 500 MG tablet   Commonly known as:  CIPRO   Dose:  500 mg   Quantity:  20 tablet        Take 1 tablet (500 mg) by mouth 2 times daily for 10 days   Refills:  0        metroNIDAZOLE 500 MG tablet   Commonly known as:  FLAGYL   Dose:  500 mg   Quantity:  30 tablet        Take 1 tablet (500 mg) by mouth 3 times daily for 10 days   Refills:  0        oxyCODONE-acetaminophen 5-325 MG per tablet   Commonly known as:  PERCOCET   Dose:  1-2 tablet   Quantity:  20 tablet        Take 1-2 tablets by mouth every 6 hours as needed for moderate to severe pain   Refills:  0          Our records show that you are taking the medicines listed below. If these are incorrect, please call your family doctor or clinic.        Dose / Directions Last dose taken    AMETHIA 0.15-0.03 &0.01 MG per tablet   Dose:  1 tablet   Quantity:  91 tablet   Generic drug:  levonorgest-eth estrad 91-Day        Take 1 tablet by mouth daily   Refills:  3        BACTRIM PO        Refills:  0        omeprazole-sodium bicarbonate  MG Caps per capsule   Dose:  1 capsule        Take 1 capsule by mouth daily   Refills:  0                Prescriptions were sent or printed at these locations (3 Prescriptions)                   Other Prescriptions                Printed at Department/Unit printer (3 of 3)         oxyCODONE-acetaminophen (PERCOCET) 5-325 MG per tablet               ciprofloxacin (CIPRO) 500 MG tablet               metroNIDAZOLE (FLAGYL) 500 MG tablet                Procedures and tests performed during your visit     CBC with platelets differential    CT Abdomen Pelvis w Contrast    Comprehensive metabolic panel    HCG qualitative urine    Lipase    Stool: occult blood    UA reflex to Microscopic and Culture      Orders Needing Specimen Collection     None      Pending Results     Date and Time Order Name Status Description    8/16/2017 1732 CT Abdomen Pelvis w Contrast Preliminary             Pending Culture Results     No orders  found from 8/14/2017 to 8/17/2017.            Pending Results Instructions     If you had any lab results that were not finalized at the time of your Discharge, you can call the ED Lab Result RN at [phone]. You will be contacted by this team for any positive Lab results or changes in treatment. The nurses are available 7 days a week from 10A to 6:30P.  You can leave a message 24 hours per day and they will return your call.        Test Results From Your Hospital Stay        8/16/2017  3:44 PM      Component Results     Component Value Ref Range & Units Status    HCG Qual Urine Negative NEG^Negative Final         8/16/2017  3:45 PM      Component Results     Component Value Ref Range & Units Status    Color Urine Yellow  Final    Appearance Urine Clear  Final    Glucose Urine Negative NEG^Negative mg/dL Final    Bilirubin Urine Negative NEG^Negative Final    Ketones Urine Negative NEG^Negative mg/dL Final    Specific Gravity Urine 1.015 1.003 - 1.035 Final    Blood Urine Negative NEG^Negative Final    pH Urine 6.0 5.0 - 7.0 pH Final    Protein Albumin Urine Negative NEG^Negative mg/dL Final    Urobilinogen mg/dL 0.0 0.0 - 2.0 mg/dL Final    Nitrite Urine Negative NEG^Negative Final    Leukocyte Esterase Urine Negative NEG^Negative Final    Source Midstream Urine  Final    RBC Urine <1 0 - 2 /HPF Final    WBC Urine <1 0 - 2 /HPF Final    Squamous Epithelial /HPF Urine 1 0 - 1 /HPF Final    Mucous Urine Present (A) NEG^Negative /LPF Final         8/16/2017  4:23 PM      Component Results     Component Value Ref Range & Units Status    Occult Blood Negative NEG^Negative Final         8/16/2017  5:05 PM      Component Results     Component Value Ref Range & Units Status    WBC 13.8 (H) 4.0 - 11.0 10e9/L Final    RBC Count 4.43 3.8 - 5.2 10e12/L Final    Hemoglobin 13.3 11.7 - 15.7 g/dL Final    Hematocrit 38.8 35.0 - 47.0 % Final    MCV 88 78 - 100 fl Final    MCH 30.0 26.5 - 33.0 pg Final    MCHC 34.3 31.5 - 36.5  g/dL Final    RDW 12.2 10.0 - 15.0 % Final    Platelet Count 192 150 - 450 10e9/L Final    Diff Method Automated Method  Final    % Neutrophils 75.1 % Final    % Lymphocytes 15.2 % Final    % Monocytes 8.1 % Final    % Eosinophils 1.0 % Final    % Basophils 0.2 % Final    % Immature Granulocytes 0.4 % Final    Nucleated RBCs 0 0 /100 Final    Absolute Neutrophil 10.4 (H) 1.6 - 8.3 10e9/L Final    Absolute Lymphocytes 2.1 0.8 - 5.3 10e9/L Final    Absolute Monocytes 1.1 0.0 - 1.3 10e9/L Final    Absolute Eosinophils 0.1 0.0 - 0.7 10e9/L Final    Absolute Basophils 0.0 0.0 - 0.2 10e9/L Final    Abs Immature Granulocytes 0.1 0 - 0.4 10e9/L Final    Absolute Nucleated RBC 0.0  Final         8/16/2017  5:22 PM      Component Results     Component Value Ref Range & Units Status    Sodium 142 133 - 144 mmol/L Final    Potassium 3.9 3.4 - 5.3 mmol/L Final    Chloride 108 94 - 109 mmol/L Final    Carbon Dioxide 26 20 - 32 mmol/L Final    Anion Gap 8 3 - 14 mmol/L Final    Glucose 96 70 - 99 mg/dL Final    Urea Nitrogen 11 7 - 30 mg/dL Final    Creatinine 0.62 0.52 - 1.04 mg/dL Final    GFR Estimate >90 >60 mL/min/1.7m2 Final    Non  GFR Calc    GFR Estimate If Black >90 >60 mL/min/1.7m2 Final    African American GFR Calc    Calcium 8.2 (L) 8.5 - 10.1 mg/dL Final    Bilirubin Total 0.3 0.2 - 1.3 mg/dL Final    Albumin 3.3 (L) 3.4 - 5.0 g/dL Final    Protein Total 6.6 (L) 6.8 - 8.8 g/dL Final    Alkaline Phosphatase 100 40 - 150 U/L Final    ALT 26 0 - 50 U/L Final    AST 16 0 - 45 U/L Final         8/16/2017  5:22 PM      Component Results     Component Value Ref Range & Units Status    Lipase 178 73 - 393 U/L Final         8/16/2017  6:27 PM      Narrative     CT ABDOMEN AND PELVIS WITH CONTRAST   8/16/2017 6:01 PM     HISTORY: Left upper quadrant abdominal pain with guarding.    COMPARISON: None.    TECHNIQUE: Following the uneventful administration of 100mL Isovue-370  intravenous contrast, helical sections  were acquired from the top of  the diaphragm through the pubic symphysis. Coronal reconstructions  were generated. Radiation dose for this scan was reduced using  automated exposure control, adjustment of the mA and/or kV according  to the patient's size, or iterative reconstruction technique.    FINDINGS:     Abdomen: The liver, spleen, pancreas and left adrenal gland are  unremarkable. Calcification in the right adrenal gland could relate to  prior granulomatous infection or trauma. The kidneys are unremarkable.  Prior cholecystectomy. No enlarged lymph nodes or free fluid in the  upper abdomen.    Scan through the lower chest is significant for a 0.3 cm nodule in the  left lower lobe that is likely of benign etiology given the patient's  young age.    Pelvis: The small and large bowel are normal in caliber. A few colonic  diverticula are present. Mild to moderate haziness is present within  the fat about a diverticulum in the posterior aspect of the mid  descending colon (for example, series 2 image 48). No extraluminal gas  or loculated fluid collections in the pelvis. The appendix is  unremarkable. The uterus is present. No enlarged lymph nodes or free  fluid in the pelvis.        Impression     IMPRESSION:   1. Diverticulitis of the mid descending colon.  2. No abscess.                Clinical Quality Measure: Blood Pressure Screening     Your blood pressure was checked while you were in the emergency department today. The last reading we obtained was  BP: 134/78 . Please read the guidelines below about what these numbers mean and what you should do about them.  If your systolic blood pressure (the top number) is less than 120 and your diastolic blood pressure (the bottom number) is less than 80, then your blood pressure is normal. There is nothing more that you need to do about it.  If your systolic blood pressure (the top number) is 120-139 or your diastolic blood pressure (the bottom number) is 80-89, your  "blood pressure may be higher than it should be. You should have your blood pressure rechecked within a year by a primary care provider.  If your systolic blood pressure (the top number) is 140 or greater or your diastolic blood pressure (the bottom number) is 90 or greater, you may have high blood pressure. High blood pressure is treatable, but if left untreated over time it can put you at risk for heart attack, stroke, or kidney failure. You should have your blood pressure rechecked by a primary care provider within the next 4 weeks.  If your provider in the emergency department today gave you specific instructions to follow-up with your doctor or provider even sooner than that, you should follow that instruction and not wait for up to 4 weeks for your follow-up visit.        Thank you for choosing [facility]       Thank you for choosing [facility] for your care. Our goal is always to provide you with excellent care. Hearing back from our patients is one way we can continue to improve our services. Please take a few minutes to complete the written survey that you may receive in the mail after you visit with us. Thank you!        Connotate Information     Connotate lets you send messages to your doctor, view your test results, renew your prescriptions, schedule appointments and more. To sign up, go to [email or web address] . Click on \"Log in\" on the left side of the screen, which will take you to the Welcome page. Then click on \"Sign up Now\" on the right side of the page.     You will be asked to enter the access code listed below, as well as some personal information. Please follow the directions to create your username and password.     Your access code is: [email or web address]  Expires: 10/7/2017  1:01 PM     Your access code will  in 90 days. If you need help or a new code, please call your [facility] clinic or [phone].        Care EveryWhere ID     This is your Care EveryWhere ID. This could be used by other " organizations to access your [place] medical records  [id number]        Equal Access to Services     TRAMAINE SINGH : Charlette Armenta, jay broderick, reica kelly. So [facility] [phone].    ATENCIÓN: Si habla español, tiene a johnson disposición servicios gratuitos de asistencia lingüística. Llame al [phone].    We comply with applicable federal civil rights laws and [place] laws. We do not discriminate on the basis of race, color, national origin, age, disability sex, sexual orientation or gender identity.            After Visit Summary       This is your record. Keep this with you and show to your community pharmacist(s) and doctor(s) at your next visit.

## 2017-08-16 NOTE — ED AVS SNAPSHOT
[facility] Emergency Department    [address]    [facility] [address]    Phone:  [phone]    Fax:  [phone]                                       Karen Buenrostro   MRN: [id number]    Department:  [facility] Emergency Department   Date of Visit:  8/16/2017           After Visit Summary Signature Page     I have received my discharge instructions, and my questions have been answered. I have discussed any challenges I see with this plan with the nurse or doctor.    ..........................................................................................................................................  Patient/Patient Representative Signature      ..........................................................................................................................................  Patient Representative Print Name and Relationship to Patient    ..................................................               ................................................  Date                                            Time    ..........................................................................................................................................  Reviewed by Signature/Title    ...................................................              ..............................................  Date                                                            Time

## 2017-08-16 NOTE — ED PROVIDER NOTES
History     Chief Complaint:  Flank Pain    HPI   Karen Buenrostro is a 26 year old female who presents to the emergency department today for evaluation of flank pain. The patient reports having left flank tenderness which was first noticed two days ago along with associated yellowness in her eyes which resolved on it's own. Her pain returned yesterday and worsened after drinking wine. Today, her pain appeared at 1100 after eating and prompted her to come to the emergency department.  She has secondary pelvic pain, pain with deep breaths, coughing and bloody stool which was first noticed today. Laying down reduces her pain. She denies painful urination, back pain, or vaginal discharge or bleeding. She denies fever and constitutional symptoms.    Allergies:  No known drug allergies     Medications:    Bactrim    Past Medical History:    C. Difficile colitis  Duodenitis  Fatty liver disease  Post concussion syndrome     Past Surgical History:    History reviewed. No pertinent surgical history.     Family History:    History reviewed. No pertinent family history.      Social History:  Smoking status: Never smoker  Alcohol use: Yes   Marital Status:  Single      Review of Systems   Constitutional: Negative for chills, diaphoresis and fever.   HENT: Negative for congestion.    Respiratory: Negative for cough, chest tightness and shortness of breath.    Cardiovascular: Negative for chest pain.   Gastrointestinal: Positive for abdominal pain, blood in stool and nausea. Negative for diarrhea.   All other systems reviewed and are negative.    Physical Exam   First Vitals:  BP: 142/90  Pulse: 101  Temp: 98.6  F (37  C)  Resp: 16  SpO2: 97 %    Physical Exam  General: Alert, appears well-developed and well-nourished. Cooperative.     In mild distress  HEENT:  Head:  Atraumatic  Ears:  External ears are normal  Mouth/Throat:  Oropharynx is without erythema or exudate and mucous membranes are moist.   Eyes:   Conjunctivae  normal and EOM are normal. No scleral icterus.    Pupils are equal, round, and reactive to light.   Neck:   Normal range of motion. Neck supple.  CV:  Normal rate, regular rhythm, normal heart sounds and radial and dorsalis pedis pulses are 2+ and symmetric.      No murmur.  Resp:  Breath sounds are clear bilaterally    Non-labored, no retractions or accessory muscle use  GI:  Abdomen is soft, no distension, LUQ tenderness with no rebound, + guarding.   MS:  No CVA tenderness bilaterally      Normal range of motion. No edema.    Normal strength in all 4 extremities.     Back atraumatic.  Skin:  Warm and dry.  No rash or lesions noted.  Neuro:  Alert. Normal strength.  Sensation intact in all 4 extremities.      GCS: 15  Psych:  Normal mood and affect.  Lymph: No anterior or posterior cervical lymphadenopathy noted.    Emergency Department Course   Imaging:  Radiology findings were communicated with the patient who voiced understanding of the findings.    CT Abdomen Pelvis w/ Contrast  IMPRESSION:   1. Diverticulitis of the mid descending colon.  2. No abscess.  Report per radiology     Laboratory:  Laboratory findings were communicated with the patient who voiced understanding of the findings.    Occult blood stool: Negative  UA: Mucous present, o/w negative  HCG: Negative  CBC: WBC 13.8 (H) WNL  HGB 13.3,   CMP: calcium 8.2 (L), albumin 3.3 (L), protein total 6.6 (L) WNL (Creatinine 0.62)  Lipase: 178  UA with Microscopic: clear and yellow with present mucous      Interventions:  1635 dilaudid 0.5 mg IV  1633 zofran 4 mg IV  1631 NS 1,000mL IV    Emergency Department Course:  Nursing notes and vitals reviewed.  I performed an exam of the patient as documented above.   1714 Patient rechecked and updated.   1731 Patient rechecked still with abdominal pain and guarding.  1900 Patient rechecked and updated.   The patient was sent for a CT Abdomen Pelvis w Contrast while in the emergency department, results  above.   The patient provided a urine sample here in the emergency department. This was sent for laboratory testing, findings above.  IV was inserted and blood was drawn for laboratory testing, results above.  I discussed the treatment plan with the patient. They expressed understanding of this plan and consented to discharge. They will be discharged home with instructions for care and follow up. In addition, the patient will return to the emergency department if their symptoms persist, worsen, if new symptoms arise or if there is any concern.  All questions were answered.  I personally reviewed the laboratory results with the Patient and answered all related questions prior to discharge.    Impression & Plan    Medical Decision Making:  Patient is 26 year female who presents with left upper quadrant abdominal pain.  Patient's CT shows acute diverticulitis of colon.  Patient with no evidence of abscess or perforation.  Patient is tolerating oral intake well in the department.  She does have significant pain but this can be controlled with oral pain medications should attempt a trial of outpatient therapy for diverticulitis patient was discharged with Cipro and Flagyl for 10 days and follow up with primary care in two days for recheck.  Patient was told to return to the emergency Department if development of fever or inability to tolerate oral intake.  Patient will be given a note for work off until 8/20/2017.  Patient was given IV fluids and pain control on the ED.  Ate crackers prior to discharge.  Patient was given education surrounding diverticulitis and close return precautions.  Patient discharged home.    Critical Care time:  none    Diagnosis:    ICD-10-CM    1. Diverticulitis of colon K57.32      Disposition:  discharged to home    Discharge Medications:  New Prescriptions    CIPROFLOXACIN (CIPRO) 500 MG TABLET    Take 1 tablet (500 mg) by mouth 2 times daily for 10 days    METRONIDAZOLE (FLAGYL) 500 MG  TABLET    Take 1 tablet (500 mg) by mouth 3 times daily for 10 days    OXYCODONE-ACETAMINOPHEN (PERCOCET) 5-325 MG PER TABLET    Take 1-2 tablets by mouth every 6 hours as needed for moderate to severe pain     Scribe Disclosure:  I, Bam Mcclure, am serving as a scribe at 3:35 PM on 8/16/2017 to document services personally performed by Danny Cosme MD based on my observations and the provider's statements to me.     8/16/2017   [facility] EMERGENCY DEPARTMENT       Danny Cosme MD  08/16/17 [id number]

## 2017-08-16 NOTE — ED NOTES
Pt resting Uncomfortably in bed.  Alert & Oriented  Pt C/O left flank pain.  No other complaints.  Left Flank: pain localized, tender  No hx of trauma

## 2017-08-16 NOTE — DISCHARGE INSTRUCTIONS
Discharge Instructions for Diverticulitis  You have been diagnosed with diverticulitis. This is a condition in which small pouches form in your colon (large intestine) and become inflamed or infected. Follow the guidelines below for home care.  As you recover  Tips for recovery include:    Eat a low-fiber diet. Your healthcare provider may advise a liquid diet. This gives your bowel a chance to rest so that it can recover.    Foods to include: flake cereal, mashed potatoes, pancakes, waffles, pasta, white bread, rice, applesauce, bananas, eggs, fish, poultry, tofu, and well-cooked vegetables    Take your medicines as directed. Do not stop taking the medicines, even if you feel better.    Monitor your temperature and report any rising temperature to your healthcare provider.    Take antibiotics exactly as directed. Do not miss any and keep taking them even if you feel better.     Drink 6 to 8 glasses of water every day, unless directed otherwise.    Use a heating pad or hot water bottle to reduce abdominal cramping or pain.  Preventing diverticulitis in the future  Tips for prevention include:    Eat a high-fiber diet. Fiber adds bulk to the stool so that it passes through the large intestine more easily.    Keep drinking 6 to 8 glasses of water every day, unless directed otherwise.    Begin an exercise program. Ask your healthcare provider how to get started. You can benefit from simple activities such as walking or gardening.    Treat diarrhea with a bland diet. Start with liquids only, then slowly add fiber over time.    Watch for changes in your bowel movements (constipation to diarrhea).    Avoid constipation with fiber and add a stool softener if needed.     Get plenty of rest and sleep.  Follow-up care  Make a follow-up appointment as directed by our staff.  When to call your healthcare provider  Call your healthcare provider immediately if you have any of the following:    Fever of 100.4 F (38.0 C) or  higher, or as directed by your healthcare provider    Chills    Severe cramps in the belly, most commonly the lower left side    Tenderness in the belly, most commonly the lower left side    Nausea and vomiting    Bleeding from your rectum   Date Last Reviewed: 7/1/2016 2000-2017 [employer]. [address] [place] [address]. All rights reserved. This information is not intended as a substitute for professional medical care. Always follow your healthcare professional's instructions.

## 2017-08-16 NOTE — LETTER
To Whom it may concern:      Karen Buenrostro was seen in our Emergency Department today, 08/16/17.  I expect her condition to improve over the next 4 days.  she may return to work on 8/20/2017.    Sincerely,      Danny Cosme MD

## 2017-08-17 ENCOUNTER — NURSE TRIAGE (OUTPATIENT)
Dept: NURSING | Facility: CLINIC | Age: 27
End: 2017-08-17

## 2017-08-17 NOTE — TELEPHONE ENCOUNTER
"  Reason for Disposition    Passed out (i.e., lost consciousness, collapsed and was not responding)    Additional Information    Negative: Shock suspected (e.g., cold/pale/clammy skin, too weak to stand, low BP, rapid pulse)    Negative: Difficult to awaken or acting confused  (e.g., disoriented, slurred speech)    Protocols used: ABDOMINAL PAIN - FEMALE-ADULT-    Pt diagnosed with diverticulitis in the ER last night.  Pt states, \"I'm worse today, I passed out, I feel light headed, nauseated and have been vomiting.\"  Denies fever, alert, talkative now.  Pt states \"I have been trying to drink water today and it comes right back up.  The pain medications even at max dose are not working.\"  Rates pain 7-8/10 now and 'severe, worse' when trying to walk.  Alert and oriented.  Pt states it is hard for her to walk because \"I get so light headed.\"  Per pt.  Protocol recommends ER, pt will follow advice.  Ryleonardo coming over to drive her in.      Cristina Norris RN  [facility]    "

## 2017-08-18 ENCOUNTER — OFFICE VISIT (OUTPATIENT)
Dept: PEDIATRICS | Facility: CLINIC | Age: 27
End: 2017-08-18
Payer: COMMERCIAL

## 2017-08-18 ENCOUNTER — APPOINTMENT (OUTPATIENT)
Dept: GENERAL RADIOLOGY | Facility: CLINIC | Age: 27
End: 2017-08-18
Attending: EMERGENCY MEDICINE
Payer: COMMERCIAL

## 2017-08-18 ENCOUNTER — HOSPITAL ENCOUNTER (OUTPATIENT)
Facility: CLINIC | Age: 27
Setting detail: OBSERVATION
Discharge: HOME | End: 2017-08-19
Attending: EMERGENCY MEDICINE | Admitting: HOSPITALIST
Payer: COMMERCIAL

## 2017-08-18 VITALS
WEIGHT: 271.5 LBS | HEART RATE: 92 BPM | OXYGEN SATURATION: 98 % | BODY MASS INDEX: 40.21 KG/M2 | DIASTOLIC BLOOD PRESSURE: 68 MMHG | HEIGHT: 69 IN | SYSTOLIC BLOOD PRESSURE: 108 MMHG | RESPIRATION RATE: 20 BRPM | TEMPERATURE: 98.4 F

## 2017-08-18 DIAGNOSIS — K57.32 DIVERTICULITIS OF COLON: Primary | ICD-10-CM

## 2017-08-18 DIAGNOSIS — K57.32 DIVERTICULITIS OF COLON: ICD-10-CM

## 2017-08-18 PROBLEM — K57.92 DIVERTICULITIS: Status: ACTIVE | Noted: 2017-08-18

## 2017-08-18 LAB
ALBUMIN SERPL-MCNC: 3.3 G/DL (ref 3.4–5)
ALBUMIN UR-MCNC: NEGATIVE MG/DL
ALP SERPL-CCNC: 99 U/L (ref 40–150)
ALT SERPL W P-5'-P-CCNC: 34 U/L (ref 0–50)
ANION GAP SERPL CALCULATED.3IONS-SCNC: 7 MMOL/L (ref 3–14)
APPEARANCE UR: CLEAR
AST SERPL W P-5'-P-CCNC: 24 U/L (ref 0–45)
BASOPHILS # BLD AUTO: 0 10E9/L (ref 0–0.2)
BASOPHILS NFR BLD AUTO: 0.2 %
BILIRUB SERPL-MCNC: 0.7 MG/DL (ref 0.2–1.3)
BILIRUB UR QL STRIP: NEGATIVE
BUN SERPL-MCNC: 6 MG/DL (ref 7–30)
CALCIUM SERPL-MCNC: 9 MG/DL (ref 8.5–10.1)
CHLORIDE SERPL-SCNC: 105 MMOL/L (ref 94–109)
CO2 SERPL-SCNC: 28 MMOL/L (ref 20–32)
COLOR UR AUTO: YELLOW
CREAT SERPL-MCNC: 0.72 MG/DL (ref 0.52–1.04)
DIFFERENTIAL METHOD BLD: NORMAL
EOSINOPHIL # BLD AUTO: 0.2 10E9/L (ref 0–0.7)
EOSINOPHIL NFR BLD AUTO: 1.6 %
ERYTHROCYTE [DISTWIDTH] IN BLOOD BY AUTOMATED COUNT: 12.4 % (ref 10–15)
GFR SERPL CREATININE-BSD FRML MDRD: >90 ML/MIN/1.7M2
GLUCOSE SERPL-MCNC: 90 MG/DL (ref 70–99)
GLUCOSE UR STRIP-MCNC: NEGATIVE MG/DL
HCG UR QL: NEGATIVE
HCT VFR BLD AUTO: 41.1 % (ref 35–47)
HGB BLD-MCNC: 13.8 G/DL (ref 11.7–15.7)
HGB UR QL STRIP: NEGATIVE
IMM GRANULOCYTES # BLD: 0.1 10E9/L (ref 0–0.4)
IMM GRANULOCYTES NFR BLD: 0.6 %
KETONES UR STRIP-MCNC: NEGATIVE MG/DL
LACTATE SERPL-SCNC: 0.8 MMOL/L (ref 0.4–2)
LEUKOCYTE ESTERASE UR QL STRIP: ABNORMAL
LIPASE SERPL-CCNC: 123 U/L (ref 73–393)
LYMPHOCYTES # BLD AUTO: 2.3 10E9/L (ref 0.8–5.3)
LYMPHOCYTES NFR BLD AUTO: 22 %
MCH RBC QN AUTO: 29.4 PG (ref 26.5–33)
MCHC RBC AUTO-ENTMCNC: 33.6 G/DL (ref 31.5–36.5)
MCV RBC AUTO: 88 FL (ref 78–100)
MONOCYTES # BLD AUTO: 0.9 10E9/L (ref 0–1.3)
MONOCYTES NFR BLD AUTO: 8.5 %
NEUTROPHILS # BLD AUTO: 7 10E9/L (ref 1.6–8.3)
NEUTROPHILS NFR BLD AUTO: 67.1 %
NITRATE UR QL: NEGATIVE
NRBC # BLD AUTO: 0 10*3/UL
NRBC BLD AUTO-RTO: 0 /100
PH UR STRIP: 7 PH (ref 5–7)
PLATELET # BLD AUTO: 214 10E9/L (ref 150–450)
POTASSIUM SERPL-SCNC: 4.1 MMOL/L (ref 3.4–5.3)
PROT SERPL-MCNC: 7.4 G/DL (ref 6.8–8.8)
RBC # BLD AUTO: 4.69 10E12/L (ref 3.8–5.2)
RBC #/AREA URNS AUTO: <1 /HPF (ref 0–2)
SODIUM SERPL-SCNC: 140 MMOL/L (ref 133–144)
SOURCE: ABNORMAL
SP GR UR STRIP: 1 (ref 1–1.03)
SQUAMOUS #/AREA URNS AUTO: 1 /HPF (ref 0–1)
UROBILINOGEN UR STRIP-MCNC: 0 MG/DL (ref 0–2)
WBC # BLD AUTO: 10.4 10E9/L (ref 4–11)
WBC #/AREA URNS AUTO: <1 /HPF (ref 0–2)

## 2017-08-18 PROCEDURE — 85025 COMPLETE CBC W/AUTO DIFF WBC: CPT | Performed by: EMERGENCY MEDICINE

## 2017-08-18 PROCEDURE — 25000128 H RX IP 250 OP 636: Performed by: EMERGENCY MEDICINE

## 2017-08-18 PROCEDURE — 96368 THER/DIAG CONCURRENT INF: CPT

## 2017-08-18 PROCEDURE — 25000125 ZZHC RX 250: Performed by: EMERGENCY MEDICINE

## 2017-08-18 PROCEDURE — G0378 HOSPITAL OBSERVATION PER HR: HCPCS

## 2017-08-18 PROCEDURE — 83690 ASSAY OF LIPASE: CPT | Performed by: EMERGENCY MEDICINE

## 2017-08-18 PROCEDURE — 81001 URINALYSIS AUTO W/SCOPE: CPT | Performed by: EMERGENCY MEDICINE

## 2017-08-18 PROCEDURE — 99285 EMERGENCY DEPT VISIT HI MDM: CPT | Mod: 25

## 2017-08-18 PROCEDURE — 96366 THER/PROPH/DIAG IV INF ADDON: CPT

## 2017-08-18 PROCEDURE — 99219 ZZC INITIAL OBSERVATION CARE,LEVL II: CPT | Performed by: PHYSICIAN ASSISTANT

## 2017-08-18 PROCEDURE — 81025 URINE PREGNANCY TEST: CPT | Performed by: EMERGENCY MEDICINE

## 2017-08-18 PROCEDURE — 96361 HYDRATE IV INFUSION ADD-ON: CPT

## 2017-08-18 PROCEDURE — 96375 TX/PRO/DX INJ NEW DRUG ADDON: CPT

## 2017-08-18 PROCEDURE — 83605 ASSAY OF LACTIC ACID: CPT | Performed by: EMERGENCY MEDICINE

## 2017-08-18 PROCEDURE — 80053 COMPREHEN METABOLIC PANEL: CPT | Performed by: EMERGENCY MEDICINE

## 2017-08-18 PROCEDURE — 25000125 ZZHC RX 250: Performed by: PHYSICIAN ASSISTANT

## 2017-08-18 PROCEDURE — 25000128 H RX IP 250 OP 636: Performed by: PHYSICIAN ASSISTANT

## 2017-08-18 PROCEDURE — 96376 TX/PRO/DX INJ SAME DRUG ADON: CPT

## 2017-08-18 PROCEDURE — 96365 THER/PROPH/DIAG IV INF INIT: CPT

## 2017-08-18 PROCEDURE — 99214 OFFICE O/P EST MOD 30 MIN: CPT | Performed by: INTERNAL MEDICINE

## 2017-08-18 PROCEDURE — 74020 XR ABDOMEN 2 VW: CPT

## 2017-08-18 RX ORDER — ACETAMINOPHEN 325 MG/1
650 TABLET ORAL EVERY 4 HOURS PRN
Status: DISCONTINUED | OUTPATIENT
Start: 2017-08-18 | End: 2017-08-19 | Stop reason: HOSPADM

## 2017-08-18 RX ORDER — OXYCODONE HYDROCHLORIDE 5 MG/1
5-10 TABLET ORAL
Status: DISCONTINUED | OUTPATIENT
Start: 2017-08-18 | End: 2017-08-19 | Stop reason: HOSPADM

## 2017-08-18 RX ORDER — HYDROMORPHONE HYDROCHLORIDE 1 MG/ML
.3-.5 INJECTION, SOLUTION INTRAMUSCULAR; INTRAVENOUS; SUBCUTANEOUS
Status: DISCONTINUED | OUTPATIENT
Start: 2017-08-18 | End: 2017-08-19 | Stop reason: HOSPADM

## 2017-08-18 RX ORDER — ONDANSETRON 2 MG/ML
4 INJECTION INTRAMUSCULAR; INTRAVENOUS ONCE
Status: COMPLETED | OUTPATIENT
Start: 2017-08-18 | End: 2017-08-18

## 2017-08-18 RX ORDER — ONDANSETRON 2 MG/ML
4 INJECTION INTRAMUSCULAR; INTRAVENOUS EVERY 6 HOURS PRN
Status: DISCONTINUED | OUTPATIENT
Start: 2017-08-18 | End: 2017-08-19 | Stop reason: HOSPADM

## 2017-08-18 RX ORDER — HYDROMORPHONE HYDROCHLORIDE 1 MG/ML
0.5 INJECTION, SOLUTION INTRAMUSCULAR; INTRAVENOUS; SUBCUTANEOUS
Status: COMPLETED | OUTPATIENT
Start: 2017-08-18 | End: 2017-08-18

## 2017-08-18 RX ORDER — SODIUM CHLORIDE 9 MG/ML
INJECTION, SOLUTION INTRAVENOUS CONTINUOUS
Status: DISCONTINUED | OUTPATIENT
Start: 2017-08-18 | End: 2017-08-19 | Stop reason: HOSPADM

## 2017-08-18 RX ORDER — AMOXICILLIN 250 MG
1-2 CAPSULE ORAL 2 TIMES DAILY PRN
Status: DISCONTINUED | OUTPATIENT
Start: 2017-08-18 | End: 2017-08-19 | Stop reason: HOSPADM

## 2017-08-18 RX ORDER — LIDOCAINE 40 MG/G
CREAM TOPICAL
Status: DISCONTINUED | OUTPATIENT
Start: 2017-08-18 | End: 2017-08-19 | Stop reason: HOSPADM

## 2017-08-18 RX ORDER — ONDANSETRON 4 MG/1
4 TABLET, ORALLY DISINTEGRATING ORAL EVERY 6 HOURS PRN
Status: DISCONTINUED | OUTPATIENT
Start: 2017-08-18 | End: 2017-08-19 | Stop reason: HOSPADM

## 2017-08-18 RX ORDER — NALOXONE HYDROCHLORIDE 0.4 MG/ML
.1-.4 INJECTION, SOLUTION INTRAMUSCULAR; INTRAVENOUS; SUBCUTANEOUS
Status: DISCONTINUED | OUTPATIENT
Start: 2017-08-18 | End: 2017-08-19 | Stop reason: HOSPADM

## 2017-08-18 RX ORDER — PROCHLORPERAZINE MALEATE 5 MG
5-10 TABLET ORAL EVERY 6 HOURS PRN
Status: DISCONTINUED | OUTPATIENT
Start: 2017-08-18 | End: 2017-08-19 | Stop reason: HOSPADM

## 2017-08-18 RX ORDER — PROCHLORPERAZINE 25 MG
25 SUPPOSITORY, RECTAL RECTAL EVERY 12 HOURS PRN
Status: DISCONTINUED | OUTPATIENT
Start: 2017-08-18 | End: 2017-08-19 | Stop reason: HOSPADM

## 2017-08-18 RX ORDER — CIPROFLOXACIN 2 MG/ML
400 INJECTION, SOLUTION INTRAVENOUS ONCE
Status: COMPLETED | OUTPATIENT
Start: 2017-08-18 | End: 2017-08-18

## 2017-08-18 RX ADMIN — METRONIDAZOLE 500 MG: 500 INJECTION, SOLUTION INTRAVENOUS at 13:10

## 2017-08-18 RX ADMIN — CIPROFLOXACIN 400 MG: 2 INJECTION, SOLUTION INTRAVENOUS at 13:09

## 2017-08-18 RX ADMIN — TAZOBACTAM SODIUM AND PIPERACILLIN SODIUM 3.38 G: 375; 3 INJECTION, SOLUTION INTRAVENOUS at 16:46

## 2017-08-18 RX ADMIN — PANTOPRAZOLE SODIUM 40 MG: 40 INJECTION, POWDER, FOR SOLUTION INTRAVENOUS at 16:42

## 2017-08-18 RX ADMIN — PROCHLORPERAZINE EDISYLATE 5 MG: 5 INJECTION INTRAMUSCULAR; INTRAVENOUS at 15:40

## 2017-08-18 RX ADMIN — SODIUM CHLORIDE: 9 INJECTION, SOLUTION INTRAVENOUS at 16:24

## 2017-08-18 RX ADMIN — SODIUM CHLORIDE 1000 ML: 9 INJECTION, SOLUTION INTRAVENOUS at 12:08

## 2017-08-18 RX ADMIN — ONDANSETRON 4 MG: 2 INJECTION INTRAMUSCULAR; INTRAVENOUS at 13:06

## 2017-08-18 RX ADMIN — ONDANSETRON 4 MG: 2 INJECTION INTRAMUSCULAR; INTRAVENOUS at 14:26

## 2017-08-18 RX ADMIN — Medication 0.5 MG: at 13:08

## 2017-08-18 ASSESSMENT — ENCOUNTER SYMPTOMS
VOMITING: 1
BLOOD IN STOOL: 0
DIZZINESS: 1
ABDOMINAL PAIN: 1
FEVER: 0
NAUSEA: 1

## 2017-08-18 NOTE — IP AVS SNAPSHOT
[facility] Observation Department    [address]    [facility] [address]    Phone:  [phone]                                       After Visit Summary   8/18/2017    Karen Buenrostro    MRN: [id number]           After Visit Summary Signature Page     I have received my discharge instructions, and my questions have been answered. I have discussed any challenges I see with this plan with the nurse or doctor.    ..........................................................................................................................................  Patient/Patient Representative Signature      ..........................................................................................................................................  Patient Representative Print Name and Relationship to Patient    ..................................................               ................................................  Date                                            Time    ..........................................................................................................................................  Reviewed by Signature/Title    ...................................................              ..............................................  Date                                                            Time

## 2017-08-18 NOTE — NURSING NOTE
"No chief complaint on file.      Initial /68  Pulse 92  Temp 98.4  F (36.9  C) (Oral)  Resp 20  Ht 5' 8.75\" (1.746 m)  Wt 271 lb 8 oz (123.2 kg)  LMP 07/30/2017  SpO2 98%  BMI 40.39 kg/m2 Estimated body mass index is 40.39 kg/(m^2) as calculated from the following:    Height as of this encounter: 5' 8.75\" (1.746 m).    Weight as of this encounter: 271 lb 8 oz (123.2 kg).  Medication Reconciliation: complete   Maria Fernanda J, CMA,AAMA      "

## 2017-08-18 NOTE — PLAN OF CARE
Problem: Discharge Planning  Goal: Discharge Planning (Adult, OB, Behavioral, Peds)  Outcome: Improving  PRIMARY DIAGNOSIS: Diverticulitis      OUTPATIENT/OBSERVATION GOALS TO BE MET BEFORE DISCHARGE  1. Orthostatic performed: N/A     2. Tolerating PO fluid and/or antibiotics (if applicable): Yes. Tolerating Fulls at this time.      3. Nausea/Vomiting/Diarrhea symptoms improved: Yes     4. Pain status: Pain free.     5. Return to near baseline physical activity: Yes     Pt denies pain at this time. N/V have improved. Pt tolerated IV Zosyn. Pt tolerated Fulls diet. Pt would like to d/c early in AM. Independent.      Discharge Planner Nurse   Safe discharge environment identified: Yes  Barriers to discharge: No       Entered by: Lu Morfin 08/18/2017 6:47 PM     Please review provider order for any additional goals.   Nurse to notify provider when observation goals have been met and patient is ready for discharge.

## 2017-08-18 NOTE — PATIENT INSTRUCTIONS
I'll call [facility] emergency room and you should return there for further evaluation and monitoring; possible admission.    Caleb Serna MD  Internal Medicine and Pediatrics

## 2017-08-18 NOTE — PHARMACY-ADMISSION MEDICATION HISTORY
Admission medication history interview status for this patient is complete. See [facility] admission navigator for allergy information, prior to admission medications and immunization status.     Medication history interview source(s):Patient  Medication history resources (including written lists, pill bottles, clinic record):None  [facility]     Changes made to PTA medication list:  Added: none  Deleted: none  Changed: omeprazole    Actions taken by pharmacist (provider contacted, etc):None     Additional medication history information:None    Medication reconciliation/reorder completed by provider prior to medication history? No    Do you take OTC medications (eg tylenol, ibuprofen, fish oil, eye/ear drops, etc)? Y(Y/N)      Prior to Admission medications    Medication Sig Last Dose Taking? Auth Provider   OMEPRAZOLE PO Take 20 mg by mouth every morning 8/16/2017 at Unknown time Yes Unknown, Entered By History   oxyCODONE-acetaminophen (PERCOCET) 5-325 MG per tablet Take 1-2 tablets by mouth every 6 hours as needed for moderate to severe pain 8/17/2017 at Unknown time Yes Danny Cosme MD   ciprofloxacin (CIPRO) 500 MG tablet Take 1 tablet (500 mg) by mouth 2 times daily for 10 days 8/18/2017 at am Yes Danny Cosme MD   metroNIDAZOLE (FLAGYL) 500 MG tablet Take 1 tablet (500 mg) by mouth 3 times daily for 10 days 8/18/2017 at am Yes Danny Cosme MD

## 2017-08-18 NOTE — IP AVS SNAPSHOT
MRN:[id number]                      After Visit Summary   8/18/2017    Karen Buenrostro    MRN: [id number]           Thank you!     Thank you for choosing [facility] for your care. Our goal is always to provide you with excellent care. Hearing back from our patients is one way we can continue to improve our services. Please take a few minutes to complete the written survey that you may receive in the mail after you visit. If you would like to speak to someone directly about your visit please contact Patient Relations at [phone]. Thank you!          Patient Information     Date Of Birth          1990        About your hospital stay     You were admitted on:  August 18, 2017 You last received care in the:  [facility] Observation Department    You were discharged on:  August 19, 2017        Reason for your hospital stay       You were evaluated in the hospital for increased nausea and vomiting after starting treatment for diverticulitis. We suspect that you had an adverse reaction to the flagyl (metronidazole) as GI side effects are very common with this antibiotic. We switched you to a different antibiotic. Take augmentin twice a day for the next 7 days.  Follow up with your primary care provider toward the end of the antibiotic course.                  Who to Call     For medical emergencies, please call 911.  For non-urgent questions about your medical care, please call your primary care provider or clinic, [phone]          Attending Provider     Provider Specialty    Radha Welsh MD Emergency Medicine    Remy Wagner MD Internal Medicine       Primary Care Provider Office Phone # Fax #    Ashley Head [phone]       When to contact your care team       Call your primary doctor if you have any of the following: temperature greater than 101, intractable nausea/vomiting, or increased pain.                  After Care  "Instructions     Activity       Your activity upon discharge: activity as tolerated            Diet       Follow this diet upon discharge: Orders Placed This Encounter      Regular Diet Adult                  Follow-up Appointments     Follow-up and recommended labs and tests        Follow up with primary care provider, Ashley Carty, within 7 days for hospital follow- up.  No follow up labs or test are needed.                             Pending Results     No orders found for last 3 day(s).            Statement of Approval     Ordered          17 0917  I have reviewed and agree with all the recommendations and orders detailed in this document.  EFFECTIVE NOW     Approved and electronically signed by:  Ashley Garcia PA-C             Admission Information     Date & Time Provider Department Dept. Phone    2017 Remy Wagner MD [facility] Observation Department [phone]      Your Vitals Were     Blood Pressure Pulse Temperature Respirations Height Weight    119/75 (BP Location: Right arm) 64 97.5  F (36.4  C) (Oral) 16 1.753 m (5' 9\") 124.1 kg (273 lb 9.6 oz)    Last Period Pulse Oximetry BMI (Body Mass Index)             2017 97% 40.4 kg/m2         [employer] Information     [employer] lets you send messages to your doctor, view your test results, renew your prescriptions, schedule appointments and more. To sign up, go to [email or web address] . Click on \"Log in\" on the left side of the screen, which will take you to the Welcome page. Then click on \"Sign up Now\" on the right side of the page.     You will be asked to enter the access code listed below, as well as some personal information. Please follow the directions to create your username and password.     Your access code is: [email or web address]  Expires: 10/7/2017  1:01 PM     Your access code will  in 90 days. If you need help or a new code, please call your [place] clinic or [phone].        Care EveryWhere ID "     This is your Care EveryWhere ID. This could be used by other organizations to access your [place] medical records  [id number]        Equal Access to Services     TRAMAINE SINGH : Hadii aad ku hadshan Armenta, wajuliocesarda luqxavier, qahaleyta kaalmada rolando, erica Ibanez [facility] [phone].    ATENCIÓN: Si habla español, tiene a johnson disposición servicios gratuitos de asistencia lingüística. Llame al [phone].    We comply with applicable federal civil rights laws and [place] laws. We do not discriminate on the basis of race, color, national origin, age, disability sex, sexual orientation or gender identity.               Review of your medicines      START taking        Dose / Directions    amoxicillin-clavulanate 875-125 MG per tablet   Commonly known as:  AUGMENTIN   Indication:  diverticulitis        Dose:  1 tablet   Take 1 tablet by mouth every 12 hours   Quantity:  13 tablet   Refills:  0       ondansetron 4 MG ODT tab   Commonly known as:  ZOFRAN ODT        Dose:  4 mg   Take 1 tablet (4 mg) by mouth every 6 hours as needed for nausea or vomiting   Quantity:  10 tablet   Refills:  1         CONTINUE these medicines which have NOT CHANGED        Dose / Directions    OMEPRAZOLE PO        Dose:  20 mg   Take 20 mg by mouth every morning   Refills:  0       oxyCODONE-acetaminophen 5-325 MG per tablet   Commonly known as:  PERCOCET        Dose:  1-2 tablet   Take 1-2 tablets by mouth every 6 hours as needed for moderate to severe pain   Quantity:  20 tablet   Refills:  0         STOP taking     ciprofloxacin 500 MG tablet   Commonly known as:  CIPRO           metroNIDAZOLE 500 MG tablet   Commonly known as:  FLAGYL                Where to get your medicines      These medications were sent to [facility] [address] [facility]  [address] [facility] [address]     Phone:  [phone]     amoxicillin-clavulanate 875-125 MG per  tablet    ondansetron 4 MG ODT tab                Protect others around you: Learn how to safely use, store and throw away your medicines at www.disposemymeds.org.             Medication List: This is a list of all your medications and when to take them. Check marks below indicate your daily home schedule. Keep this list as a reference.      Medications           Morning Afternoon Evening Bedtime As Needed    amoxicillin-clavulanate 875-125 MG per tablet   Commonly known as:  AUGMENTIN   Take 1 tablet by mouth every 12 hours   Last time this was given:  1 tablet on 8/19/2017  9:27 AM                                OMEPRAZOLE PO   Take 20 mg by mouth every morning                                ondansetron 4 MG ODT tab   Commonly known as:  ZOFRAN ODT   Take 1 tablet (4 mg) by mouth every 6 hours as needed for nausea or vomiting                                oxyCODONE-acetaminophen 5-325 MG per tablet   Commonly known as:  PERCOCET   Take 1-2 tablets by mouth every 6 hours as needed for moderate to severe pain

## 2017-08-18 NOTE — H&P
[facility]  Observation Unit  H & P      Patient Name: Karen Buenrostro MRN# [id number]   Age: 26 year old YOB: 1990     Date of Admission:8/18/2017    Primary care provider: Ashley Head  Date of Service: 8/18/2017         Assessment and Plan:   Karen Buenrostro is a 26 year old female with a history of Duodenitis, Fatty Liver Disease and Post Concussion Syndrome with recently diagnosed Diverticulitis who presents to the ED today 2/2 abdominal pain.    Acute Diverticulitis, Nausea, Emesis - ongoing abdominal pain since 8/14.  Seen in the ED on 8/16 with CT revealing diverticulitis of the mid descending colon without abscess.  Now with ongoing pain with nausea and emesis.    ED work up revealed patient hemodynamically stable, afebrile on room air.  Laboratory work up revealed CMP, Lipase, Lactic Acid, UA and CBC within normal limits.  AXR revealed bowel gas pattern wnl and no free air.  Patient received IVF, IV Dilaudid, IV Ciprofloxacin, IV Flagyl and IV Zofran and admitted for further management.  - will change to Zosyn to see if this improves her nausea and emesis ?possible side effect to cipro/flagyl  - serial abdominal exams with low threshold to re CT if any fevers/wbc increase in pain  - IVF hydration  - IV antiemetics and pain control.    Hx Duodenitis - hold home Omeprazole 2/2 nausea and emesis.  Start IV Protonix    Pulmonary Nodule - 0.3cm LLL nodule noted on CT 8/16/17.  Follow up with PCP for monitoring.     CODE: Full  Diet/IVF: full liquids, NS  GI ppx:  protonix  DVT ppx: ambulation    Luiza STREET-C  Physician Assistant   Hospitalist Service  Pager: [phone]           Chief Complaint:   Abdominal Pain, Nausea, Emesis         HPI:   26 year old female with a history of Duodenitis, Fatty Liver Disease and Post Concussion Syndrome with recently diagnosed Diverticulitis who presents to the ED today 2/2 abdominal pain.    Patient reports left sided  "abdominal pain started on 8/14.  Pain became progressively worse with dizziness and blood in her stool on 8/16.  She presented to the ED.  Patient's CT showed acute diverticulitis of colon without evidence of abscess or perforation.  She was discharged with Ciprofloxacin and Flagyl for 10 days.  Patient reports since she was discharged, she has continued to have left sided abdominal pain which has not increased in severity, but she reports it is \"spreading\" more on her left side.  She reports nausea with several episodes of emesis within 10 minutes of taking her medications.  Since arriving to the ED, she has had 2 additional emesis episodes.  She reports stools are loose, having 1 per day and denies blood.  No chest pain, shortness of breath, dysuria or fevers.              Past Medical History:     Past Medical History:   Diagnosis Date     Diverticulitis      Duodenitis determined by biopsy 2013    thought due to excedrin use     Fatty liver disease, nonalcoholic      Post concussion syndrome 2011          Past Surgical History:     Past Surgical History:   Procedure Laterality Date     CHOLECYSTECTOMY            Social History:     Social History     Social History     Marital status: Single     Spouse name: N/A     Number of children: N/A     Years of education: N/A     Occupational History      [employer]     Social History Main Topics     Smoking status: Never Smoker     Smokeless tobacco: Never Used     Alcohol use Yes     Drug use: No     Sexual activity: Yes     Birth control/ protection: Pill     Other Topics Concern     Not on file     Social History Narrative          Family History:     Family History   Problem Relation Age of Onset     Family History Negative Father      Family History Negative Mother           Allergies:    No Known Allergies       Medications:     Prior to Admission medications    Medication Sig Last Dose Taking? Auth Provider   OMEPRAZOLE PO Take 20 mg by mouth every " "morning 8/16/2017 at Unknown time Yes Unknown, Entered By History   oxyCODONE-acetaminophen (PERCOCET) 5-325 MG per tablet Take 1-2 tablets by mouth every 6 hours as needed for moderate to severe pain 8/17/2017 at Unknown time Yes Danny Cosme MD   ciprofloxacin (CIPRO) 500 MG tablet Take 1 tablet (500 mg) by mouth 2 times daily for 10 days 8/18/2017 at am Yes Danny Cosme MD   metroNIDAZOLE (FLAGYL) 500 MG tablet Take 1 tablet (500 mg) by mouth 3 times daily for 10 days 8/18/2017 at am Yes Danny Cosme MD          Review of Systems:   A complete ROS was performed and is negative other than what is stated in the HPI.       Physical Exam:   Blood pressure 114/74, pulse 77, temperature 97.8  F (36.6  C), temperature source Oral, resp. rate 20, height 1.753 m (5' 9\"), weight 122.9 kg (271 lb), last menstrual period 07/31/2017, SpO2 94 %, not currently breastfeeding. on room air  General: Alert, interactive, NAD, lying in bed  HEENT: AT/NC, sclera anicteric, PERRL, EOMI  Chest/Resp: clear to auscultation bilaterally, no crackles or wheezes  Heart/CV: regular rate and rhythm, no murmur  Abdomen/GI: Soft, tender to palpation over left mid/upper abdomen, nondistended, +guarding. Decreased BS.  No rebound.  Extremities/MSK: No LE edema  Skin: Warm and dry  Neuro: Alert & oriented x 3, no focal deficits, moves all extremities equally         Labs:     CMP  Recent Labs  Lab 08/18/17  1155 08/16/17  1652    142   POTASSIUM 4.1 3.9   CHLORIDE 105 108   CO2 28 26   ANIONGAP 7 8   GLC 90 96   BUN 6* 11   CR 0.72 0.62   GFRESTIMATED >90 >90   GFRESTBLACK >90 >90   RAFFY 9.0 8.2*   PROTTOTAL 7.4 6.6*   ALBUMIN 3.3* 3.3*   BILITOTAL 0.7 0.3   ALKPHOS 99 100   AST 24 16   ALT 34 26     CBC  Recent Labs  Lab 08/18/17  1155 08/16/17  1652   WBC 10.4 13.8*   RBC 4.69 4.43   HGB 13.8 13.3   HCT 41.1 38.8   MCV 88 88   MCH 29.4 30.0   MCHC 33.6 34.3   RDW 12.4 12.2    192          Imaging/Procedures:     Results for orders " placed or performed during the hospital encounter of 08/18/17   Abdomen XR, 2 vw, flat and upright    Narrative    ABDOMEN TWO VIEW  8/18/2017 2:12 PM    HISTORY:  Diverticulitis, nausea and vomiting, assess for free air.    COMPARISON:  None.      Impression    IMPRESSION:  Bowel gas pattern within normal limits. No free air.     EB DENNIS MD

## 2017-08-18 NOTE — ED NOTES
[facility]  ED Nurse Handoff Report    Karen Buenrostro is a 26 year old female   ED Chief complaint: diverticulitis  . ED Diagnosis:   Final diagnoses:   None     Allergies: No Known Allergies    Code Status: Full Code  Activity level - Baseline/Home:  Independent. Activity Level - Current:   Independent. Lift room needed: No. Bariatric: No   Needed: No   Isolation: No. Infection: Not Applicable.     Vital Signs:   Vitals:    08/18/17 1330 08/18/17 1345 08/18/17 1415 08/18/17 1430   BP: 123/77 (!) 141/92 117/80 113/68   Pulse:       Resp:       Temp:       TempSrc:       SpO2: 95% 99% 90% 96%   Weight:       Height:           Cardiac Rhythm:  ,      Pain level: 0-10 Pain Scale: 7  Patient confused: No. Patient Falls Risk: No.   Elimination Status: Has voided   Patient Report - Initial Complaint: patient complaining of nausea and vomiting and worsening abdominal pain with treatment of diverticulitis. Focused Assessment: nausea and vomiting, diffuse abdominal tenderness   Tests Performed: abdominal x-ray, labs. Abnormal Results: none.   Treatments provided: flagyl and cipro IV, fluid bolus, zofran x 2, dilaudid  Family Comments: patient in ED alone  OBS brochure/video discussed/provided to patient:  N/A  ED Medications:   Medications   0.9% sodium chloride BOLUS (0 mLs Intravenous Stopped 8/18/17 1302)   ciprofloxacin (CIPRO) infusion 400 mg (0 mg Intravenous Stopped 8/18/17 1420)   metroNIDAZOLE (FLAGYL) infusion 500 mg (0 mg Intravenous Stopped 8/18/17 1420)   ondansetron (ZOFRAN) injection 4 mg (4 mg Intravenous Given 8/18/17 1306)   HYDROmorphone (PF) (DILAUDID) injection 0.5 mg (0.5 mg Intravenous Given 8/18/17 1308)   ondansetron (ZOFRAN) injection 4 mg (4 mg Intravenous Given 8/18/17 1426)     Drips infusing:  No  For the majority of the shift this patient was Green. Interventions performed were none.     Severe Sepsis OR Septic Shock Diagnosis Present: No      ED Nurse Name/Phone  Number: Chantel Petty,   2:34 PM  RECEIVING UNIT ED HANDOFF REVIEW    Above ED Nurse Handoff Report was reviewed: Yes  Reviewed by: Ashley Guardado on August 18, 2017 at 3:35 PM

## 2017-08-18 NOTE — PROGRESS NOTES
"  SUBJECTIVE:   Karen Buenrostro is a 26 year old female who presents to clinic today for the following health issues:      ED/UC Followup:    Facility:  [facility]  Date of visit: 08/16/2017  Reason for visit: Abdominal pain  Current Status: still painful. Pt states that pain is worse, throwing up after eating, dizzy and ran a low grade fever last night         Seen in emergency room for left sided abd pain for a few hours.  Thought it was a sprained \"muscle\", but then developed faintness and worse pain on left side.  Then noted some blood in stool; was told to come to emergency room; had blood and urine that was within normal limits; CT scan in emergency room showed diverticulitis; began on cipro and flagyl about 2 days ago. Not needing oxycodone.  Pain has worsened; now in central abdomen and lower back.  No fevers.      Vomiting since yesterday AM now.      Was told to eat bland foods in emergency room; taking only crackers and bananas.   Cannot keep down much water, but able to sip sprite.  For last 24 hours, has not been able to keep anything down.  Feels like vomits more than she acutally drinking, too.  Urinated 4-5 times since yesterday.     Problem list and histories reviewed & adjusted, as indicated.  Additional history: as documented    There is no problem list on file for this patient.    No past surgical history on file.    Social History   Substance Use Topics     Smoking status: Never Smoker     Smokeless tobacco: Not on file     Alcohol use Yes     Family History   Problem Relation Age of Onset     Family History Negative Father      Family History Negative Mother          Current Outpatient Prescriptions   Medication Sig Dispense Refill     ciprofloxacin (CIPRO) 500 MG tablet Take 1 tablet (500 mg) by mouth 2 times daily for 10 days 20 tablet 0     metroNIDAZOLE (FLAGYL) 500 MG tablet Take 1 tablet (500 mg) by mouth 3 times daily for 10 days 30 tablet 0     Omeprazole-Sodium Bicarbonate  MG " "CAPS Take 1 capsule by mouth daily       oxyCODONE-acetaminophen (PERCOCET) 5-325 MG per tablet Take 1-2 tablets by mouth every 6 hours as needed for moderate to severe pain (Patient not taking: Reported on 8/18/2017) 20 tablet 0     No Known Allergies  BP Readings from Last 3 Encounters:   08/18/17 108/68   08/16/17 137/86   07/09/17 109/66    Wt Readings from Last 3 Encounters:   08/18/17 271 lb 8 oz (123.2 kg)   05/16/16 263 lb (119.3 kg)                  Labs reviewed in EPIC        Reviewed and updated as needed this [facility]  [facility]       Reviewed and updated as needed this visit by Provider         ROS:  C: NEGATIVE for fever, chills, change in weight  E/M: NEGATIVE for ear, mouth and throat problems  R: NEGATIVE for significant cough or SOB  CV: NEGATIVE for chest pain, palpitations or peripheral edema    OBJECTIVE:                                                    /68  Pulse 92  Temp 98.4  F (36.9  C) (Oral)  Resp 20  Ht 5' 8.75\" (1.746 m)  Wt 271 lb 8 oz (123.2 kg)  LMP 07/30/2017  SpO2 98%  BMI 40.39 kg/m2  Body mass index is 40.39 kg/(m^2).   GENERAL: healthy, alert, well nourished, well hydrated, no distress  HENT: ear canals- normal; TMs- normal; Nose- normal; Mouth- no ulcers, no lesions  NECK: no tenderness, no adenopathy, no asymmetry, no masses, no stiffness; thyroid- normal to palpation  RESP: lungs clear to auscultation - no rales, no rhonchi, no wheezes  CV: regular rates and rhythm, normal S1 S2, no S3 or S4 and no murmur, no click or rub -  ABDOMEN: diffuse tenderness on left lower quadrant.   Guarding.     Diagnostic test results:  Diagnostic Test Results:  none        ASSESSMENT/PLAN:                                                    Diverticultitis:    Worsening symptoms of pain, now with vomiting and inability to take adequate fluids, despite limiting diet and being on antibiotic for last 2 days.  Re-referred to emergency room for IVF, observation, " possible rescanning and admission.     Patient Instructions   I'll call [facility] emergency room and you should return there for further evaluation and monitoring; possible admission.    Caleb Serna MD  Internal Medicine and Pediatrics       E4: Spent 25 minutes face to face.     More than 50% of the time was spent in counseling and coordination of care of diverticulitis.   Caleb Serna MD  [facility]

## 2017-08-18 NOTE — MR AVS SNAPSHOT
"              After Visit Summary   2017    Karen Buenrostro    MRN: [id number]           Patient Information     Date Of Birth          1990        Visit Information        Provider Department      2017 11:00 AM Caleb Serna MD [facility]        Care Instructions    I'll call [facility] emergency room and you should return there for further evaluation and monitoring; possible admission.    Caleb Serna MD  Internal Medicine and Pediatrics             Follow-ups after your visit        Who to contact     If you have questions or need follow up information about today's clinic visit or your schedule please contact [facility] directly at [phone].  Normal or non-critical lab and imaging results will be communicated to you by MyChart, letter or phone within 4 business days after the clinic has received the results. If you do not hear from us within 7 days, please contact the clinic through MyChart or phone. If you have a critical or abnormal lab result, we will notify you by phone as soon as possible.  Submit refill requests through [employer] or call your pharmacy and they will forward the refill request to us. Please allow 3 business days for your refill to be completed.          Additional Information About Your Visit        MyChart Information     [employer]-IO lets you send messages to your doctor, view your test results, renew your prescriptions, schedule appointments and more. To sign up, go to [email or web address] . Click on \"Log in\" on the left side of the screen, which will take you to the Welcome page. Then click on \"Sign up Now\" on the right side of the page.     You will be asked to enter the access code listed below, as well as some personal information. Please follow the directions to create your username and password.     Your access code is: [email or web address]  Expires: 10/7/2017  1:01 PM     Your access code will  in 90 days. If you need help or a new code, " "please call your [place] clinic or [phone].        Care EveryWhere ID     This is your Care EveryWhere ID. This could be used by other organizations to access your [place] medical records  [id number]        Your Vitals Were     Pulse Temperature Respirations Height Last Period Pulse Oximetry    92 98.4  F (36.9  C) (Oral) 20 5' 8.75\" (1.746 m) 07/30/2017 98%    BMI (Body Mass Index)                   40.39 kg/m2            Blood Pressure from Last 3 Encounters:   08/18/17 108/68   08/16/17 137/86   07/09/17 109/66    Weight from Last 3 Encounters:   08/18/17 271 lb 8 oz (123.2 kg)   05/16/16 263 lb (119.3 kg)              Today, you had the following     No orders found for display         Today's Medication Changes          These changes are accurate as of: 8/18/17 11:16 AM.  If you have any questions, ask your nurse or doctor.               Stop taking these medicines if you haven't already. Please contact your care team if you have questions.     AMETHIA 0.15-0.03 &0.01 MG per tablet   Generic drug:  levonorgest-eth estrad 91-Day   Stopped by:  Caleb Serna MD           BACTRIM PO   Stopped by:  Caleb Serna MD                    Primary Care Provider Office Phone # Fax #    Ashley WELDON MD [place] [phone]       [facility] [place] [address]        Equal Access to Services     [facility] AH: Hadii leigh garayo Somak, waaxda luqadaha, qaybta kaalmada rolando, waxjocelynn angel dleeon aguilarjonah sanchez. So [facility] [phone].    ATENCIÓN: Si habla español, tiene a johnson disposición servicios gratuitos de asistencia lingüística. Llame al [phone].    We comply with applicable federal civil rights laws and [place] laws. We do not discriminate on the basis of race, color, national origin, age, disability sex, sexual orientation or gender identity.            Thank you!     Thank you for choosing [facility]  for your care. Our goal is always to provide you " with excellent care. Hearing back from our patients is one way we can continue to improve our services. Please take a few minutes to complete the written survey that you may receive in the mail after your visit with us. Thank you!             Your Updated Medication List - Protect others around you: Learn how to safely use, store and throw away your medicines at www.disposemymeds.org.          This list is accurate as of: 8/18/17 11:16 AM.  Always use your most recent med list.                   Brand Name Dispense Instructions for use Diagnosis    ciprofloxacin 500 MG tablet    CIPRO    20 tablet    Take 1 tablet (500 mg) by mouth 2 times daily for 10 days        metroNIDAZOLE 500 MG tablet    FLAGYL    30 tablet    Take 1 tablet (500 mg) by mouth 3 times daily for 10 days        omeprazole-sodium bicarbonate  MG Caps per capsule      Take 1 capsule by mouth daily        oxyCODONE-acetaminophen 5-325 MG per tablet    PERCOCET    20 tablet    Take 1-2 tablets by mouth every 6 hours as needed for moderate to severe pain

## 2017-08-18 NOTE — ED PROVIDER NOTES
"  History     Chief Complaint:    Abdominal pain      HPI   Karen Buenrostro is a 26 year old female with a history of diverticulitis who presents to the ED today with abdominal pain. The patient states that she was diagnosed with diverticulitis 2 days ago and states that she currently feels really dizzy. She state the pain is spreading and that it hurts the most on her left side. She has been vomiting and states that she has not been able to keep any of her antibiotics down. The patient reports that her last bowel movement was this morning and it was fairy normal. She denies any blood in her stool or vomit, as well as any fevers.     Allergies:  No known drug allergies.      Medications:    Percocet  cipro  Flagyl    Past Medical History:    C. Difficile colitis  Diverticulitis  uodenitis  Fatty liver disease  Post concussion syndrome     Past Surgical History:    History reviewed. No pertinent past surgical history.     Family History:    History reviewed. No pertinent family history.     Social History:  Marital Status: single  Presents to the ED alone  Tobacco Use: never smoker  Alcohol Use: yes  PCP: Ashley Carty     Review of Systems   Constitutional: Negative for fever.   Gastrointestinal: Positive for abdominal pain, nausea and vomiting. Negative for blood in stool.   Neurological: Positive for dizziness.   All other systems reviewed and are negative.      Physical Exam   First Vitals:  BP: 130/89  Pulse: 77  Temp: 97.8  F (36.6  C)  Resp: 20  Height: 175.3 cm (5' 9\")  Weight: 122.9 kg (271 lb)  SpO2: 94 %    Physical Exam  General: Patient is alert and interactive when I enter the room  Head:  The scalp, face, and head appear normal  Eyes:  Conjunctivae are normal  ENT:    The nose is normal    Pinnae are normal    External acoustic canals are normal  Neck:  Trachea midline  CV:  Pulses are normal    Resp:  No respiratory distress   Abdomen:      Soft, non-distended    left sided flank pain    Left " lower quadrant pain    Guarding present  Musc:  Normal muscular tone    No major joint effusions    No asymmetric leg swelling    Good capillary refill noted  Skin:  No rash or lesions noted  Neuro: Speech is normal and fluent. Face is symmetric.     Moving all extremities well.   Psych:  Awake. Alert.  Normal affect.  Appropriate interactions.     Emergency Department Course   Imaging:  Abdomen xray, 2 views  Bowel gas pattern within normal limits. No free air.   Report per radiology.   Radiographic findings were communicated with the patient who voiced understanding of the findings.      Laboratory:  CBC:  WBC 10.4, HGB 13.8, , otherwise WNL   CMP: BUN 6 (L), albumin 3.3 (L), otherwise WNL (Creatinine 0.72)  Lipase: 123  Lactic Acid: 0.8  UA: Clear yellow urine, leukocyte esterase trace otherwise WNL   HCG Qualitative Pregnancy (1256): Negative.     Interventions:  (1208) Normal Saline, 1 liter, IV bolus   (1306) Zofran 4 mg, IV  (1308) Dilaudid 0.5 mg, IV  (1309) Cipro 400 mg, IV  (1310) Flagyl 500 mg, IV    Emergency Department Course:  Nursing notes and vitals reviewed.  (1158) I performed an exam of the patient as documented above.    The patient was sent for an abdominal xray while in the emergency department, findings above.    A peripheral IV was established. Blood was drawn from the patient. This was sent for laboratory testing, findings above.    Findings and plan explained to the patient who consents to admission.  I discussed the patient with Dr. Myrick of the hospitalist service, who will admit the patient to an observation bed for further monitoring, evaluation, and treatment.     Impression & Plan    Medical Decision Making:  Karen Buenrostro is a 26 year old female who presents with left lower quadrant pain after recent diagnosed with diverticulitis. Has not been able to keep her antibiotics down. Her vitals were unremarkable. IV dilaudid and Zofran were given. Differential for her  includes, diverticulitis, abscess, and others. Patient had noraml lactate. It seems unlikely the patient has an abscess with improved white count and normal lactae. However, will definitely need Iv antibiotics and admission. Started on IV ciprofloxacin and flagyl. Patient had some improvement of her pain with Dilaudid. Patient admitted to medicine to ensure improvement given that she has failed outpatient management.     Diagnosis:    ICD-10-CM    1. Diverticulitis of colon K57.32        Disposition:  Admitted to hospitalist       Shantel YOUNG, ny serving as a scribe on 8/18/2017 at 11:58 AM to personally document services performed by Dr. Welsh based on my observations and the provider's statements to me.   8/18/2017   [facility] EMERGENCY DEPARTMENT       Radha Welsh MD  08/18/17 1800

## 2017-08-18 NOTE — ED NOTES
Pt presents for evaluation of diverticulitis. Pt has been on antibiotics x 2 days and has been unable to keep meds or food down. Emesis x 1 today with constant nausea and dizziness. Pain is spreading, rating it an 8/10. Pain is LLQ.

## 2017-08-19 VITALS
OXYGEN SATURATION: 97 % | HEIGHT: 69 IN | DIASTOLIC BLOOD PRESSURE: 75 MMHG | RESPIRATION RATE: 16 BRPM | TEMPERATURE: 97.5 F | WEIGHT: 273.6 LBS | SYSTOLIC BLOOD PRESSURE: 119 MMHG | HEART RATE: 64 BPM | BODY MASS INDEX: 40.52 KG/M2

## 2017-08-19 LAB
ANION GAP SERPL CALCULATED.3IONS-SCNC: 6 MMOL/L (ref 3–14)
BUN SERPL-MCNC: 6 MG/DL (ref 7–30)
CALCIUM SERPL-MCNC: 8.5 MG/DL (ref 8.5–10.1)
CHLORIDE SERPL-SCNC: 109 MMOL/L (ref 94–109)
CO2 SERPL-SCNC: 26 MMOL/L (ref 20–32)
CREAT SERPL-MCNC: 0.67 MG/DL (ref 0.52–1.04)
ERYTHROCYTE [DISTWIDTH] IN BLOOD BY AUTOMATED COUNT: 12.3 % (ref 10–15)
GFR SERPL CREATININE-BSD FRML MDRD: >90 ML/MIN/1.7M2
GLUCOSE SERPL-MCNC: 91 MG/DL (ref 70–99)
HCT VFR BLD AUTO: 35.6 % (ref 35–47)
HGB BLD-MCNC: 12 G/DL (ref 11.7–15.7)
MCH RBC QN AUTO: 29.6 PG (ref 26.5–33)
MCHC RBC AUTO-ENTMCNC: 33.7 G/DL (ref 31.5–36.5)
MCV RBC AUTO: 88 FL (ref 78–100)
PLATELET # BLD AUTO: 187 10E9/L (ref 150–450)
POTASSIUM SERPL-SCNC: 4 MMOL/L (ref 3.4–5.3)
RBC # BLD AUTO: 4.06 10E12/L (ref 3.8–5.2)
SODIUM SERPL-SCNC: 141 MMOL/L (ref 133–144)
WBC # BLD AUTO: 8.5 10E9/L (ref 4–11)

## 2017-08-19 PROCEDURE — 25000125 ZZHC RX 250: Performed by: PHYSICIAN ASSISTANT

## 2017-08-19 PROCEDURE — G0378 HOSPITAL OBSERVATION PER HR: HCPCS

## 2017-08-19 PROCEDURE — 25000132 ZZH RX MED GY IP 250 OP 250 PS 637: Performed by: PHYSICIAN ASSISTANT

## 2017-08-19 PROCEDURE — 80048 BASIC METABOLIC PNL TOTAL CA: CPT | Performed by: PHYSICIAN ASSISTANT

## 2017-08-19 PROCEDURE — 25000128 H RX IP 250 OP 636: Performed by: PHYSICIAN ASSISTANT

## 2017-08-19 PROCEDURE — 36415 COLL VENOUS BLD VENIPUNCTURE: CPT | Performed by: PHYSICIAN ASSISTANT

## 2017-08-19 PROCEDURE — 99217 ZZC OBSERVATION CARE DISCHARGE: CPT | Performed by: PHYSICIAN ASSISTANT

## 2017-08-19 PROCEDURE — 85027 COMPLETE CBC AUTOMATED: CPT | Performed by: PHYSICIAN ASSISTANT

## 2017-08-19 PROCEDURE — 96366 THER/PROPH/DIAG IV INF ADDON: CPT

## 2017-08-19 RX ORDER — ONDANSETRON 4 MG/1
4 TABLET, ORALLY DISINTEGRATING ORAL EVERY 6 HOURS PRN
Qty: 10 TABLET | Refills: 1 | Status: SHIPPED | OUTPATIENT
Start: 2017-08-19 | End: 2018-11-20

## 2017-08-19 RX ORDER — PANTOPRAZOLE SODIUM 40 MG/1
40 TABLET, DELAYED RELEASE ORAL EVERY MORNING
Status: DISCONTINUED | OUTPATIENT
Start: 2017-08-20 | End: 2017-08-19

## 2017-08-19 RX ORDER — PANTOPRAZOLE SODIUM 40 MG/1
40 TABLET, DELAYED RELEASE ORAL EVERY MORNING
Status: DISCONTINUED | OUTPATIENT
Start: 2017-08-20 | End: 2017-08-19 | Stop reason: HOSPADM

## 2017-08-19 RX ADMIN — PANTOPRAZOLE SODIUM 40 MG: 40 INJECTION, POWDER, FOR SOLUTION INTRAVENOUS at 06:13

## 2017-08-19 RX ADMIN — TAZOBACTAM SODIUM AND PIPERACILLIN SODIUM 3.38 G: 375; 3 INJECTION, SOLUTION INTRAVENOUS at 00:09

## 2017-08-19 RX ADMIN — AMOXICILLIN AND CLAVULANATE POTASSIUM 1 TABLET: 875; 125 TABLET, FILM COATED ORAL at 09:27

## 2017-08-19 RX ADMIN — SODIUM CHLORIDE: 9 INJECTION, SOLUTION INTRAVENOUS at 03:02

## 2017-08-19 RX ADMIN — TAZOBACTAM SODIUM AND PIPERACILLIN SODIUM 3.38 G: 375; 3 INJECTION, SOLUTION INTRAVENOUS at 05:44

## 2017-08-19 NOTE — PLAN OF CARE
Problem: Discharge Planning  Goal: Discharge Planning (Adult, OB, Behavioral, Peds)  Outcome: Improving  PRIMARY DIAGNOSIS: DIVERTICULITIS  OUTPATIENT/OBSERVATION GOALS TO BE MET BEFORE DISCHARGE:  1. ADLs back to baseline: Yes      2. Activity and level of assistance: Ambulating independently.      3. Pain status: minimal, 2/10, tolerable      4. Return to near baseline physical activity: Yes          Pt A&Ox4, VSS. Pt reports 2/10 abd discomfort, declines need for interventions at this time. Denies nausea, tolerated fulls last evening. Pt up ind in room, feeling much improved since admission and ready to d/c. Will continue to monitor.     Discharge Planner Nurse   Safe discharge environment identified: Yes  Barriers to discharge: Yes, PO antx       Entered by: Shaina Cartagena 08/19/2017 9:32 AM     Please review provider order for any additional goals.   Nurse to notify provider when observation goals have been met and patient is ready for discharge.

## 2017-08-19 NOTE — PLAN OF CARE
Problem: Discharge Planning  Goal: Discharge Planning (Adult, OB, Behavioral, Peds)  Outcome: No Change  Problem: Discharge Planning  Goal: Discharge Planning (Adult, OB, Behavioral, Peds)  Outcome: Improving  PRIMARY DIAGNOSIS: Diverticulitis       OUTPATIENT/OBSERVATION GOALS TO BE MET BEFORE DISCHARGE  1. Orthostatic performed: N/A      2. Tolerating PO fluid and/or antibiotics (if applicable): Yes. Tolerating Fulls at this time.       3. Nausea/Vomiting/Diarrhea symptoms improved: Yes      4. Pain status: Pain free.      5. Return to near baseline physical activity: Yes      Pt denies pain at this time. N/V have improved. Pt tolerated IV Zosyn. Pt tolerated Fulls diet. Pt would like to d/c early in AM. Independent.       Discharge Planner Nurse   Safe discharge environment identified: Yes  Barriers to discharge: No       Entered by: Lu Morfin 08/18/2017 6:47 PM     Please review provider order for any additional goals.   Nurse to notify provider when observation goals have been met and patient is ready for discharge.

## 2017-08-19 NOTE — PLAN OF CARE
"Problem: Discharge Planning  Goal: Discharge Planning (Adult, OB, Behavioral, Peds)  Outcome: No Change  PRIMARY DIAGNOSIS: \"GENERIC\" NURSING, Diverticulitis  OUTPATIENT/OBSERVATION GOALS TO BE MET BEFORE DISCHARGE:  1. ADLs back to baseline: Yes      2. Activity and level of assistance: Ambulating independently.      3. Pain status: Pain free.      4. Return to near baseline physical activity: Yes          Discharge Planner Nurse   Safe discharge environment identified: Yes  Barriers to discharge: Yes, patient continues on IVF and requires IV Abx       Entered by: Lotus Pringle 08/19/2017 0010     Please review provider order for any additional goals.   Nurse to notify provider when observation goals have been met and patient is ready for discharge.      "

## 2017-08-19 NOTE — PLAN OF CARE
"Problem: Discharge Planning  Goal: Discharge Planning (Adult, OB, Behavioral, Peds)  PRIMARY DIAGNOSIS: \"GENERIC\" NURSING  OUTPATIENT/OBSERVATION GOALS TO BE MET BEFORE DISCHARGE:  1. ADLs back to baseline: Yes      2. Activity and level of assistance: Ambulating independently.      3. Pain status: Pain free, denies nausea.      4. Return to near baseline physical activity: Yes         Discharge Planner Nurse   Safe discharge environment identified: Yes  Barriers to discharge: Yes, continues on IVF and IV Abx.         Entered by: Lotus Pringle 08/19/2017 3:41 AM     Please review provider order for any additional goals.   Nurse to notify provider when observation goals have been met and patient is ready for discharge.     Received this patient at 2330.  Denies pain or nausea.  VSS.  IVF infusing and required IV Abx overnight.  Passing gas, last BM on 18th.  Tolerating  Full Liquid diet.  Up independently in room.  Will be dc'd home when stable.  Will continue to monitor this patient.        "

## 2017-08-21 ENCOUNTER — TELEPHONE (OUTPATIENT)
Dept: PEDIATRICS | Facility: CLINIC | Age: 27
End: 2017-08-21

## 2017-08-21 NOTE — TELEPHONE ENCOUNTER
"  Hospital/TCU/ED for chronic condition Discharge Protocol    \"Hi, my name is Mary Miller, a registered nurse, and I am calling from [facility].  I am calling to follow up and see how things are going for you after your recent emergency visit/hospital/TCU stay.\"    Tell me how you are doing now that you are home?\" doing just fine.       Discharge Instructions    \"Let's review your discharge instructions.  What is/are the follow-up recommendations?  Pt. Response: f/u with PCP at completion of abx    \"Has an appointment with your primary care provider been scheduled?\"   No (schedule appointment)     Will call back to schedule. Needs to look at her work schedule. She did schedule her physical    \"When you see the provider, I would recommend that you bring your medications with you.\"    Medications    \"Tell me what changed about your medicines when you discharged?\"    Changes to chronic meds?    0-1    \"What questions do you have about your medications?\"    None     New diagnoses of heart failure, COPD, diabetes, or MI?    No                  Medication reconciliation completed? Yes  Was MTM referral placed (*Make sure to put transitions as reason for referral)?   No    Call Summary    \"What questions or concerns do you have about your recent visit and your follow-up care?\"     none    \"If you have questions or things don't continue to improve, we encourage you contact us through the main clinic number (give number).  Even if the clinic is not open, triage nurses are available 24/7 to help you.     We would like you to know that our clinic has extended hours (provide information).  We also have urgent care (provide details on closest location and hours/contact info)\"      \"Thank you for your time and take care!\"       Mary MONROY RN, BSN, PHN  [facility] RN        "

## 2017-08-21 NOTE — DISCHARGE SUMMARY
[facility] Outpatient / Observation Unit  Discharge Summary        Karen Buenrostro MRN# [id number]   YOB: 1990 Age: 26 year old     Date of Admission:  8/18/2017  Date of Discharge:  8/19/2017 10:33 AM  Admitting Physician:  Remy Wagner MD  Discharge Physician: Ashley Garcia PA-C  Discharging Service: Hospitalist      Primary Provider: Ashley Head  Primary Care Physician Phone Number: [phone]         Primary Discharge Diagnoses:    Karen Buenrostro was admitted on 8/18/2017 for concerns of nausea, vomiting and diarrhea. Consistent with ongoing diverticulitis symptoms vs adverse side effect of flagyl.     1. Acute diverticulitis, nausea, emesis: suspect patient was having adverse side effects of flagyl treatment for recently treated diverticulitis. When she took flagyl at home as well as when she got IV cipro/flagyl in the ED she had nausea and emesis. She was placed on IV zosyn overnight on observation and then transitioned to po augmentin in the AM. Her pain and nausea improved. Now resolved.  She will complete a 7-day course of po BID augmentin upon discharge, to be completed on evening of 8/25/17.        Secondary Discharge Diagnoses:     Past Medical History:   Diagnosis Date     Diverticulitis      Duodenitis determined by biopsy 2013    thought due to excedrin use     Fatty liver disease, nonalcoholic      Post concussion syndrome 2011                Code Status:      Full Code        Brief Hospital Summary:        Reason for your hospital stay       You were evaluated in the hospital for increased nausea and vomiting   after starting treatment for diverticulitis. We suspect that you had an   adverse reaction to the flagyl (metronidazole) as GI side effects are very   common with this antibiotic. We switched you to a different antibiotic.   Take augmentin twice a day for the next 7 days.  Follow up with your   primary care provider toward the end of the antibiotic course.                     Please refer to initial admission history and physical for further details.   Briefly, Karen Buenrostro was admitted on 8/18/2017 for concerns of acute nausea, vomiting after starting outpatient treatment with cipro/flagyl for diverticulitis. Work up in ED did not show any evidence of bowel obstruction. Pt was registered to the Observation Unit for continued supportive therapy and IV antibiotic treatment    Pt was resuscitated with IV fluids, cipro/flagyl were discontinued and replaced with IV zosyn overnight. She continued on supportive measures including anti-emetics and pain control as needed. Labs were reviewed and significant results addressed.  On the day of discharge, symptoms were resolving and pt was able to tolerate PO intake. Vitals were stable, without evidence of orthostasis. Medications were reviewed and adjustments made as necessary. Pt is instructed to follow up as below.            Significant Labs & Imaging During Hospitalization:        Results for orders placed or performed during the hospital encounter of 08/18/17   Abdomen XR, 2 vw, flat and upright    Narrative    ABDOMEN TWO VIEW  8/18/2017 2:12 PM    HISTORY:  Diverticulitis, nausea and vomiting, assess for free air.    COMPARISON:  None.      Impression    IMPRESSION:  Bowel gas pattern within normal limits. No free air.     EB DENNIS MD   CBC with platelets differential   Result Value Ref Range    WBC 10.4 4.0 - 11.0 10e9/L    RBC Count 4.69 3.8 - 5.2 10e12/L    Hemoglobin 13.8 11.7 - 15.7 g/dL    Hematocrit 41.1 35.0 - 47.0 %    MCV 88 78 - 100 fl    MCH 29.4 26.5 - 33.0 pg    MCHC 33.6 31.5 - 36.5 g/dL    RDW 12.4 10.0 - 15.0 %    Platelet Count 214 150 - 450 10e9/L    Diff Method Automated Method     % Neutrophils 67.1 %    % Lymphocytes 22.0 %    % Monocytes 8.5 %    % Eosinophils 1.6 %    % Basophils 0.2 %    % Immature Granulocytes 0.6 %    Nucleated RBCs 0 0 /100    Absolute Neutrophil 7.0 1.6 - 8.3 10e9/L     Absolute Lymphocytes 2.3 0.8 - 5.3 10e9/L    Absolute Monocytes 0.9 0.0 - 1.3 10e9/L    Absolute Eosinophils 0.2 0.0 - 0.7 10e9/L    Absolute Basophils 0.0 0.0 - 0.2 10e9/L    Abs Immature Granulocytes 0.1 0 - 0.4 10e9/L    Absolute Nucleated RBC 0.0    Comprehensive metabolic panel   Result Value Ref Range    Sodium 140 133 - 144 mmol/L    Potassium 4.1 3.4 - 5.3 mmol/L    Chloride 105 94 - 109 mmol/L    Carbon Dioxide 28 20 - 32 mmol/L    Anion Gap 7 3 - 14 mmol/L    Glucose 90 70 - 99 mg/dL    Urea Nitrogen 6 (L) 7 - 30 mg/dL    Creatinine 0.72 0.52 - 1.04 mg/dL    GFR Estimate >90 >60 mL/min/1.7m2    GFR Estimate If Black >90 >60 mL/min/1.7m2    Calcium 9.0 8.5 - 10.1 mg/dL    Bilirubin Total 0.7 0.2 - 1.3 mg/dL    Albumin 3.3 (L) 3.4 - 5.0 g/dL    Protein Total 7.4 6.8 - 8.8 g/dL    Alkaline Phosphatase 99 40 - 150 U/L    ALT 34 0 - 50 U/L    AST 24 0 - 45 U/L   Lipase   Result Value Ref Range    Lipase 123 73 - 393 U/L   Lactic acid   Result Value Ref Range    Lactic Acid 0.8 0.4 - 2.0 mmol/L   UA with Microscopic reflex to Culture   Result Value Ref Range    Color Urine Yellow     Appearance Urine Clear     Glucose Urine Negative NEG^Negative mg/dL    Bilirubin Urine Negative NEG^Negative    Ketones Urine Negative NEG^Negative mg/dL    Specific Gravity Urine 1.005 1.003 - 1.035    Blood Urine Negative NEG^Negative    pH Urine 7.0 5.0 - 7.0 pH    Protein Albumin Urine Negative NEG^Negative mg/dL    Urobilinogen mg/dL 0.0 0.0 - 2.0 mg/dL    Nitrite Urine Negative NEG^Negative    Leukocyte Esterase Urine Trace (A) NEG^Negative    Source Midstream Urine     WBC Urine <1 0 - 2 /HPF    RBC Urine <1 0 - 2 /HPF    Squamous Epithelial /HPF Urine 1 0 - 1 /HPF   HCG qualitative urine   Result Value Ref Range    HCG Qual Urine Negative NEG^Negative   Basic metabolic panel   Result Value Ref Range    Sodium 141 133 - 144 mmol/L    Potassium 4.0 3.4 - 5.3 mmol/L    Chloride 109 94 - 109 mmol/L    Carbon Dioxide 26 20 - 32  mmol/L    Anion Gap 6 3 - 14 mmol/L    Glucose 91 70 - 99 mg/dL    Urea Nitrogen 6 (L) 7 - 30 mg/dL    Creatinine 0.67 0.52 - 1.04 mg/dL    GFR Estimate >90 >60 mL/min/1.7m2    GFR Estimate If Black >90 >60 mL/min/1.7m2    Calcium 8.5 8.5 - 10.1 mg/dL   CBC with platelets   Result Value Ref Range    WBC 8.5 4.0 - 11.0 10e9/L    RBC Count 4.06 3.8 - 5.2 10e12/L    Hemoglobin 12.0 11.7 - 15.7 g/dL    Hematocrit 35.6 35.0 - 47.0 %    MCV 88 78 - 100 fl    MCH 29.6 26.5 - 33.0 pg    MCHC 33.7 31.5 - 36.5 g/dL    RDW 12.3 10.0 - 15.0 %    Platelet Count 187 150 - 450 10e9/L           Pending Results:        Unresulted Labs Ordered in the Past 30 Days of this Admission     No orders found from 6/19/2017 to 8/19/2017.              Consultations This Hospital Stay:      No consultations were requested during this admission         Discharge Instructions and Follow-Up:      Follow-up Appointments     Follow-up and recommended labs and tests        Follow up with primary care provider, Ashley Carty, within 7 days   for hospital follow- up.  No follow up labs or test are needed.                  Pt instructed to follow up with PCP in 7 days and with Consultant if indicated.   Follow-up Labs None        Discharge Disposition:      Discharged to home         Discharge Medications:        Discharge Medication List as of 8/19/2017 10:12 AM      START taking these medications    Details   amoxicillin-clavulanate (AUGMENTIN) 875-125 MG per tablet Take 1 tablet by mouth every 12 hours, Disp-13 tablet, R-0, E-Prescribe      ondansetron (ZOFRAN ODT) 4 MG ODT tab Take 1 tablet (4 mg) by mouth every 6 hours as needed for nausea or vomiting, Disp-10 tablet, R-1, E-Prescribe         CONTINUE these medications which have NOT CHANGED    Details   OMEPRAZOLE PO Take 20 mg by mouth every morning, Historical      oxyCODONE-acetaminophen (PERCOCET) 5-325 MG per tablet Take 1-2 tablets by mouth every 6 hours as needed for moderate to severe  "pain, Disp-20 tablet, R-0, Local Print         STOP taking these medications       ciprofloxacin (CIPRO) 500 MG tablet Comments:   Reason for Stopping:         metroNIDAZOLE (FLAGYL) 500 MG tablet Comments:   Reason for Stopping:                   Allergies:       No Known Allergies        Condition and Physical on Discharge:      Discharge condition: Stable   Vitals: Blood pressure 119/75, pulse 64, temperature 97.5  F (36.4  C), temperature source Oral, resp. rate 16, height 1.753 m (5' 9\"), weight 124.1 kg (273 lb 9.6 oz), last menstrual period 07/31/2017, SpO2 97 %, not currently breastfeeding.  273 lbs 9.6 oz      GENERAL:  Comfortable.  PSYCH: pleasant, oriented, No acute distress.  HEENT:  PERRLA. Normal conjunctiva, normal hearing, nasal mucosa and Oropharynx are normal.  NECK:  Supple, no neck vein distention, adenopathy or bruits, normal thyroid.  HEART:  Normal S1, S2 with no murmur, no pericardial rub, gallops or S3 or S4.  LUNGS:  Clear to auscultation, normal Respiratory effort. No wheezing, rales or ronchi.  ABDOMEN:  Soft, no hepatosplenomegaly, normal bowel sounds. Non-tender, non distended.   EXTREMITIES:  No pedal edema, +2 pulses bilateral and equal.  SKIN:  Dry to touch, No rash, wound or ulcerations.  NEUROLOGIC:  CN 2-12 intact, BL 5/5 symmetric upper and lower extremity strength, sensation is intact with no focal deficits.   "

## 2017-08-21 NOTE — TELEPHONE ENCOUNTER
Please contact patient for In-patient follow up.  [phone] (home)     Visit date: 8/19/17  Diagnosis listed:Diverticulitis Of Colon  Number of visits in past 12 months:0

## 2017-09-26 ENCOUNTER — OFFICE VISIT (OUTPATIENT)
Dept: PEDIATRICS | Facility: CLINIC | Age: 27
End: 2017-09-26
Payer: COMMERCIAL

## 2017-09-26 VITALS — DIASTOLIC BLOOD PRESSURE: 80 MMHG | SYSTOLIC BLOOD PRESSURE: 102 MMHG

## 2017-09-26 DIAGNOSIS — Z12.4 CERVICAL CANCER SCREENING: ICD-10-CM

## 2017-09-26 DIAGNOSIS — Z87.19 HISTORY OF DIVERTICULITIS: ICD-10-CM

## 2017-09-26 DIAGNOSIS — Z11.3 SCREEN FOR STD (SEXUALLY TRANSMITTED DISEASE): ICD-10-CM

## 2017-09-26 DIAGNOSIS — Z23 NEED FOR PROPHYLACTIC VACCINATION AND INOCULATION AGAINST INFLUENZA: ICD-10-CM

## 2017-09-26 DIAGNOSIS — Z30.9 ENCOUNTER FOR CONTRACEPTIVE MANAGEMENT, UNSPECIFIED TYPE: ICD-10-CM

## 2017-09-26 DIAGNOSIS — E78.5 HYPERLIPIDEMIA LDL GOAL <160: ICD-10-CM

## 2017-09-26 DIAGNOSIS — K21.9 GASTROESOPHAGEAL REFLUX DISEASE, ESOPHAGITIS PRESENCE NOT SPECIFIED: ICD-10-CM

## 2017-09-26 DIAGNOSIS — Z01.419 ENCOUNTER FOR GYNECOLOGICAL EXAMINATION WITHOUT ABNORMAL FINDING: Primary | ICD-10-CM

## 2017-09-26 PROBLEM — K57.92 DIVERTICULITIS: Status: RESOLVED | Noted: 2017-08-18 | Resolved: 2017-09-26

## 2017-09-26 PROCEDURE — 90686 IIV4 VACC NO PRSV 0.5 ML IM: CPT | Performed by: INTERNAL MEDICINE

## 2017-09-26 PROCEDURE — 99395 PREV VISIT EST AGE 18-39: CPT | Mod: 25 | Performed by: INTERNAL MEDICINE

## 2017-09-26 PROCEDURE — G0145 SCR C/V CYTO,THINLAYER,RESCR: HCPCS | Performed by: INTERNAL MEDICINE

## 2017-09-26 PROCEDURE — 90471 IMMUNIZATION ADMIN: CPT | Performed by: INTERNAL MEDICINE

## 2017-09-26 PROCEDURE — 87491 CHLMYD TRACH DNA AMP PROBE: CPT | Performed by: INTERNAL MEDICINE

## 2017-09-26 PROCEDURE — 87591 N.GONORRHOEAE DNA AMP PROB: CPT | Performed by: INTERNAL MEDICINE

## 2017-09-26 RX ORDER — NICOTINE POLACRILEX 4 MG/1
20 GUM, CHEWING ORAL EVERY MORNING
Qty: 30 TABLET | Refills: 11 | Status: SHIPPED | OUTPATIENT
Start: 2017-09-26 | End: 2018-11-20 | Stop reason: ALTCHOICE

## 2017-09-26 RX ORDER — LEVONORGESTREL / ETHINYL ESTRADIOL AND ETHINYL ESTRADIOL 150-30(84)
1 KIT ORAL DAILY
COMMUNITY
End: 2018-11-20 | Stop reason: ALTCHOICE

## 2017-09-26 RX ORDER — ETHINYL ESTRADIOL AND LEVONORGESTREL 150-30(84)
1 KIT ORAL DAILY
Qty: 91 TABLET | Refills: 3 | Status: SHIPPED | OUTPATIENT
Start: 2017-09-26 | End: 2018-11-20

## 2017-09-26 NOTE — PROGRESS NOTES
SUBJECTIVE:   CC: Karen Buenrostro is an 27 year old woman who presents for preventive health visit.     Physical   Annual:     Getting at least 3 servings of Calcium per day::  NO    Bi-annual eye exam::  NO    Dental care twice a year::  NO    Sleep apnea or symptoms of sleep apnea::  None    Diet::  Breakfast skipped    Frequency of exercise::  2-3 days/week    Duration of exercise::  15-30 minutes    Taking medications regularly::  Yes    Medication side effects::  None    Additional concerns today::  No          BC  New Rx : Amethia     Today's PHQ-2 Score: PHQ-2 ( 1999 Pfizer) 9/26/2017   Q1: Little interest or pleasure in doing things 0   Q2: Feeling down, depressed or hopeless 0   PHQ-2 Score 0   Q1: Little interest or pleasure in doing things Not at all   Q2: Feeling down, depressed or hopeless Not at all   PHQ-2 Score 0       Abuse: Current or Past(Physical, Sexual or Emotional)- No  Do you feel safe in your environment - Yes    Social History   Substance Use Topics     Smoking status: Never Smoker     Smokeless tobacco: Never Used     Alcohol use Yes     The patient does not drink >3 drinks per day nor >7 drinks per week.    Reviewed orders with patient.  Reviewed health maintenance and updated orders accordingly - Yes  BP Readings from Last 3 Encounters:   09/26/17 102/80   08/19/17 119/75   08/18/17 108/68    Wt Readings from Last 3 Encounters:   09/26/17 (P) 272 lb 1.6 oz (123.4 kg)   08/18/17 273 lb 9.6 oz (124.1 kg)   08/18/17 271 lb 8 oz (123.2 kg)                  Patient Active Problem List   Diagnosis     History of diverticulitis     Past Surgical History:   Procedure Laterality Date     CHOLECYSTECTOMY         Social History   Substance Use Topics     Smoking status: Never Smoker     Smokeless tobacco: Never Used     Alcohol use Yes     Family History   Problem Relation Age of Onset     Family History Negative Father      Family History Negative Mother                Mammogram not  appropriate for this patient based on age.    Pertinent mammograms are reviewed under the imaging tab.  History of abnormal Pap smear: NO - age 21-29 PAP every 3 years recommended  Last 3 Pap Results: No results found for: PAP    Reviewed and updated as needed this visit by clinical staff         Reviewed and updated as needed this visit by Provider            ROS:  C: NEGATIVE for fever, chills, change in weight  I: NEGATIVE for worrisome rashes, moles or lesions  E: NEGATIVE for vision changes or irritation  ENT: NEGATIVE for ear, mouth and throat problems  R: NEGATIVE for significant cough or SOB  B: NEGATIVE for masses, tenderness or discharge  CV: NEGATIVE for chest pain, palpitations or peripheral edema  GI: NEGATIVE for nausea, abdominal pain, heartburn, or change in bowel habits  : NEGATIVE for unusual urinary or vaginal symptoms. Periods are regular.  M: NEGATIVE for significant arthralgias or myalgia  N: NEGATIVE for weakness, dizziness or paresthesias  P: NEGATIVE for changes in mood or affect     OBJECTIVE:   There were no vitals taken for this visit.  EXAM:  GENERAL: healthy, alert and no distress  EYES: Eyes grossly normal to inspection, PERRL and conjunctivae and sclerae normal  HENT: ear canals and TM's normal, nose and mouth without ulcers or lesions  NECK: no adenopathy, no asymmetry, masses, or scars and thyroid normal to palpation  RESP: lungs clear to auscultation - no rales, rhonchi or wheezes  BREAST: normal without masses, tenderness or nipple discharge and no palpable axillary masses or adenopathy  CV: regular rate and rhythm, normal S1 S2, no S3 or S4, no murmur, click or rub, no peripheral edema and peripheral pulses strong  ABDOMEN: soft, nontender, no hepatosplenomegaly, no masses and bowel sounds normal   (female): normal female external genitalia, normal urethral meatus, vaginal mucosa pink, moist, well rugated, and normal cervix/adnexa/uterus without masses or discharge  MS: no  "gross musculoskeletal defects noted, no edema  SKIN: no suspicious lesions or rashes  NEURO: Normal strength and tone, mentation intact and speech normal  PSYCH: mentation appears normal, affect normal/bright    ASSESSMENT/PLAN:   1. Encounter for gynecological examination without abnormal finding      2. Gastroesophageal reflux disease, esophagitis presence not specified  Will try switch over to H2 blocker.  - omeprazole 20 MG tablet; Take 1 tablet (20 mg) by mouth every morning  Dispense: 30 tablet; Refill: 11  - ranitidine (ZANTAC) 300 MG tablet; Take 1 tablet (300 mg) by mouth At Bedtime  Dispense: 90 tablet; Refill: 3    3. Encounter for contraceptive management, unspecified type    - AMETHIA 0.15-0.03 &0.01 MG per tablet; Take 1 tablet by mouth daily  Dispense: 91 tablet; Refill: 3    4. Hyperlipidemia LDL goal <160    - **Lipid panel reflex to direct LDL FUTURE anytime; Future  - **Glucose FUTURE anytime; Future  - **A1C FUTURE anytime; Future    5. History of diverticulitis  Had diverticulitis at such a young age. Plan colonoscopy and consult with GI  - GASTROENTEROLOGY ADULT REF CONSULT ONLY  - GASTROENTEROLOGY ADULT REF PROCEDURE ONLY    6. Screen for STD (sexually transmitted disease)    - NEISSERIA GONORRHOEA PCR  - CHLAMYDIA TRACHOMATIS PCR    7. Need for prophylactic vaccination and inoculation against influenza    - VACCINE ADMINISTRATION, INITIAL  - FLU VAC, SPLIT VIRUS IM > 3 YO (QUADRIVALENT) [63155]  - Vaccine Administration, Initial [95173]    8. Cervical cancer screening    - Pap imaged thin layer screen reflex to HPV if ASCUS - recommend age 25 - 29    COUNSELING:  Reviewed preventive health counseling, as reflected in patient instructions         reports that she has never smoked. She has never used smokeless tobacco.    Estimated body mass index is 40.4 kg/(m^2) as calculated from the following:    Height as of 8/18/17: 5' 9\" (1.753 m).    Weight as of 8/18/17: 273 lb 9.6 oz (124.1 kg). "   Weight management plan: Discussed healthy diet and exercise guidelines and patient will follow up in 12 months in clinic to re-evaluate.    Counseling Resources:  ATP IV Guidelines  Pooled Cohorts Equation Calculator  Breast Cancer Risk Calculator  FRAX Risk Assessment  ICSI Preventive Guidelines  Dietary Guidelines for Americans, 2010  USDA's MyPlate  ASA Prophylaxis  Lung CA Screening    Ashley Carty MD  [facility]

## 2017-09-26 NOTE — PATIENT INSTRUCTIONS
Preventive Health Recommendations  Female Ages 26 - 39  Yearly exam:   See your health care provider every year in order to    Review health changes.     Discuss preventive care.      Review your medicines if you your doctor has prescribed any.    Until age 30: Get a Pap test every three years (more often if you have had an abnormal result).    After age 30: Talk to your doctor about whether you should have a Pap test every 3 years or have a Pap test with HPV screening every 5 years.   You do not need a Pap test if your uterus was removed (hysterectomy) and you have not had cancer.  You should be tested each year for STDs (sexually transmitted diseases), if you're at risk.   Talk to your provider about how often to have your cholesterol checked.  If you are at risk for diabetes, you should have a diabetes test (fasting glucose).  Shots: Get a flu shot each year. Get a tetanus shot every 10 years.   Nutrition:     Eat at least 5 servings of fruits and vegetables each day.    Eat whole-grain bread, whole-wheat pasta and brown rice instead of white grains and rice.    Talk to your provider about Calcium and Vitamin D.     Lifestyle    Exercise at least 150 minutes a week (30 minutes a day, 5 days of the week). This will help you control your weight and prevent disease.    Limit alcohol to one drink per day.    No smoking.     Wear sunscreen to prevent skin cancer.    See your dentist every six months for an exam and cleaning.    Start ranitidine 150 mg twice daily.  Switch omeprazole to every other day for a month, then try stopping.    I'm going to place an order for your consultation and colonoscopy. You should get a call to schedule this. If you don't hear from our colonoscopy schedulers in 1-2 days, you can call the [facility] :   Appt. Line [phone].

## 2017-09-26 NOTE — PROGRESS NOTES
Injectable Influenza Immunization Documentation    1.  Is the person to be vaccinated sick today?   No    2. Does the person to be vaccinated have an allergy to a component   of the vaccine?   No    3. Has the person to be vaccinated ever had a serious reaction   to influenza vaccine in the past?   No    4. Has the person to be vaccinated ever had Guillain-Barré syndrome?   No    Form completed by Ashley Head M.D.           Answers for HPI/ROS submitted by the patient on 9/26/2017   Annual Exam:  Getting at least 3 servings of Calcium per day:: NO  Bi-annual eye exam:: NO  Dental care twice a year:: NO  Sleep apnea or symptoms of sleep apnea:: None  Diet:: Breakfast skipped  Frequency of exercise:: 2-3 days/week  Taking medications regularly:: Yes  Medication side effects:: None  Additional concerns today:: No  PHQ-2 Score: 0  Duration of exercise:: 15-30 minutes

## 2017-09-26 NOTE — LETTER
[address] [address] [phone]   October 2, 2017    Karen Murphy  [address] [address] [place] [id number]        Dear Karen,    As we expected, your gonorrhea and chlamydia tests were negative.    It was a pleasure seeing you at your recent visit. Let me know if you have any questions or concerns.     Ashley Head M.D.      Results for orders placed or performed in visit on 09/26/17   Pap imaged thin layer screen reflex to HPV if ASCUS - recommend age 25 - 29   Result Value Ref Range    PAP NIL     Copath Report         Patient Name: KAREN MURPHY  MR#: [id number]  Specimen #: [id number]  Collected: 9/26/2017  Received: 9/27/2017  Reported: 9/28/2017 08:41  Ordering Phy(s): ASHLEY HEAD    For improved result formatting, select 'View Enhanced Report Format'  under Linked Documents section.    SPECIMEN/STAIN PROCESS:  Pap imaged thin layer prep screening (Surepath, FocalPoint with guided  screening)       Pap-Cyto x 1, Pap with reflex to HPV if ASCUS x 1    SOURCE: Cervical, endocervical  ----------------------------------------------------------------   Pap imaged thin layer prep screening (Surepath, FocalPoint with guided  screening)  SPECIMEN ADEQUACY:  Satisfactory for evaluation.  -Transformation zone component present.    CYTOLOGIC INTERPRETATION:    Negative for intraepithelial lesion or malignancy    Electronically signed out by:  ROMAINE Mo  (ASCP)    Processed and screened at [facility],  [facility]    CLINICAL HISTORY:    Papan icolaou Test Limitations:  Cervical cytology is a screening test  with limited sensitivity; regular screening is critical for cancer  prevention; Pap tests are primarily effective for the  diagnosis/prevention of squamous cell carcinoma, not adenocarcinomas or  other cancers.    TESTING LAB  LOCATION:  [address] [place]  [id number] [phone]    COLLECTION SITE:  Client:  [facility]  Location: [facility] (R)     NEISSERIA GONORRHOEA PCR   Result Value Ref Range    Specimen Descrip Cervical     N Gonorrhea PCR Negative NEG^Negative   CHLAMYDIA TRACHOMATIS PCR   Result Value Ref Range    Specimen Description Cervical     Chlamydia Trachomatis PCR Negative NEG^Negative

## 2017-09-26 NOTE — NURSING NOTE
"Chief Complaint   Patient presents with     Physical       Initial /80  Pulse (P) 76  Temp (P) 98.3  F (36.8  C) (Oral)  Ht (P) 5' 9\" (1.753 m)  Wt (P) 272 lb 1.6 oz (123.4 kg)  SpO2 (P) 98%  BMI (P) 40.18 kg/m2 Estimated body mass index is 40.18 kg/(m^2) (pended) as calculated from the following:    Height as of this encounter: (P) 5' 9\" (1.753 m).    Weight as of this encounter: (P) 272 lb 1.6 oz (123.4 kg).  Medication Reconciliation: complete   Sho Hebert MA// September 26, 2017 1:13 PM          "

## 2017-09-26 NOTE — LETTER
October 6, 2017      Karen Buenrostro  [address] [address] [place] [id number]    Dear ,      I am happy to inform you that your recent cervical cancer screening test (PAP smear) was normal.      Preventative screenings such as this help to ensure your health for years to come. You should repeat a pap smear in 3 years, unless otherwise directed.      You will still need to return to the clinic every year for your annual exam and other preventive tests.     Please contact the clinic at [phone] if you have further questions.       Sincerely,      Ashley Carty MD/[facility]

## 2017-09-26 NOTE — MR AVS SNAPSHOT
After Visit Summary   9/26/2017    Karen Buenrostro    MRN: [id number]           Patient Information     Date Of Birth          1990        Visit Information        Provider Department      9/26/2017 12:50 PM Ashley Head MD [facility]        Today's Diagnoses     Encounter for gynecological examination without abnormal finding    -  1    Gastroesophageal reflux disease, esophagitis presence not specified        Encounter for contraceptive management, unspecified type        History of diverticulitis        Screen for STD (sexually transmitted disease)        Need for prophylactic vaccination and inoculation against influenza        Cervical cancer screening        Hyperlipidemia LDL goal <160          Care Instructions      Preventive Health Recommendations  Female Ages 26 - 39  Yearly exam:   See your health care provider every year in order to    Review health changes.     Discuss preventive care.      Review your medicines if you your doctor has prescribed any.    Until age 30: Get a Pap test every three years (more often if you have had an abnormal result).    After age 30: Talk to your doctor about whether you should have a Pap test every 3 years or have a Pap test with HPV screening every 5 years.   You do not need a Pap test if your uterus was removed (hysterectomy) and you have not had cancer.  You should be tested each year for STDs (sexually transmitted diseases), if you're at risk.   Talk to your provider about how often to have your cholesterol checked.  If you are at risk for diabetes, you should have a diabetes test (fasting glucose).  Shots: Get a flu shot each year. Get a tetanus shot every 10 years.   Nutrition:     Eat at least 5 servings of fruits and vegetables each day.    Eat whole-grain bread, whole-wheat pasta and brown rice instead of white grains and rice.    Talk to your provider about Calcium and Vitamin D.     Lifestyle    Exercise at least 150  minutes a week (30 minutes a day, 5 days of the week). This will help you control your weight and prevent disease.    Limit alcohol to one drink per day.    No smoking.     Wear sunscreen to prevent skin cancer.    See your dentist every six months for an exam and cleaning.    Start ranitidine 150 mg twice daily.  Switch omeprazole to every other day for a month, then try stopping.    I'm going to place an order for your consultation and colonoscopy. You should get a call to schedule this. If you don't hear from our colonoscopy schedulers in 1-2 days, you can call the [facility] :   Appt. Line [phone].            Follow-ups after your visit        Additional Services     GASTROENTEROLOGY ADULT REF CONSULT ONLY       Preferred Location: [place] [facility] [phone]      Please be aware that coverage of these services is subject to the terms and limitations of your health insurance plan.  Call member services at your health plan with any benefit or coverage questions.  Any procedures must be performed at a [facility] facility OR coordinated by your clinic's referral office.    Please bring the following with you to your appointment:    (1) Any X-Rays, CTs or MRIs which have been performed.  Contact the facility where they were done to arrange for  prior to your scheduled appointment.    (2) List of current medications   (3) This referral request   (4) Any documents/labs given to you for this referral            GASTROENTEROLOGY ADULT REF PROCEDURE ONLY       Last Lab Result: Creatinine (mg/dL)       Date                     Value                 08/19/2017               0.67             ----------  Body mass index is 40.18 kg/(m^2) (pended).      Patient will be contacted to schedule procedure.     Please be aware that coverage of these services is subject to the terms and limitations of your health insurance plan.  Call member services at your health plan with any benefit or coverage questions.   "Any procedures must be performed at a [facility] facility OR coordinated by your clinic's referral office.    Please bring the following with you to your appointment:    (1) Any X-Rays, CTs or MRIs which have been performed.  Contact the facility where they were done to arrange for  prior to your scheduled appointment.    (2) List of current medications   (3) This referral request   (4) Any documents/labs given to you for this referral                  Future tests that were ordered for you today     Open Future Orders        Priority Expected Expires Ordered    **Lipid panel reflex to direct LDL FUTURE anytime Routine 9/26/2017 9/26/2018 9/26/2017    **Glucose FUTURE anytime Routine 9/26/2017 9/26/2018 9/26/2017    **A1C FUTURE anytime Routine 9/26/2017 9/26/2018 9/26/2017            Who to contact     If you have questions or need follow up information about today's clinic visit or your schedule please contact [facility] directly at [phone].  Normal or non-critical lab and imaging results will be communicated to you by Cytoguidehart, letter or phone within 4 business days after the clinic has received the results. If you do not hear from us within 7 days, please contact the clinic through [facility] or phone. If you have a critical or abnormal lab result, we will notify you by phone as soon as possible.  Submit refill requests through [employer] or call your pharmacy and they will forward the refill request to us. Please allow 3 business days for your refill to be completed.          Additional Information About Your Visit        [facility] Information     [facility] lets you send messages to your doctor, view your test results, renew your prescriptions, schedule appointments and more. To sign up, go to [email or web address] . Click on \"Log in\" on the left side of the screen, which will take you to the Welcome page. Then click on \"Sign up Now\" on the right side of the page.     You will be asked to enter " the access code listed below, as well as some personal information. Please follow the directions to create your username and password.     Your access code is: [email or web address]  Expires: 10/7/2017  1:01 PM     Your access code will  in 90 days. If you need help or a new code, please call your [facility] clinic or [phone].        Care EveryWhere ID     This is your Care EveryWhere ID. This could be used by other organizations to access your [facility] medical records  [id number]         Blood Pressure from Last 3 Encounters:   17 102/80   17 119/75   17 108/68    Weight from Last 3 Encounters:   17 (P) 272 lb 1.6 oz (123.4 kg)   17 273 lb 9.6 oz (124.1 kg)   17 271 lb 8 oz (123.2 kg)              We Performed the Following     CHLAMYDIA TRACHOMATIS PCR     FLU VAC, SPLIT VIRUS IM > 3 YO (QUADRIVALENT) [64257]     GASTROENTEROLOGY ADULT REF CONSULT ONLY     GASTROENTEROLOGY ADULT REF PROCEDURE ONLY     NEISSERIA GONORRHOEA PCR     Pap imaged thin layer screen reflex to HPV if ASCUS - recommend age 25 - 29     Vaccine Administration, Initial [[address]350]     VACCINE ADMINISTRATION, INITIAL          Today's Medication Changes          These changes are accurate as of: 17  3:00 PM.  If you have any questions, ask your nurse or doctor.               Start taking these medicines.        Dose/Directions    ranitidine 300 MG tablet   Commonly known as:  ZANTAC   Used for:  Gastroesophageal reflux disease, esophagitis presence not specified   Started by:  Ashley Head MD        Dose:  300 mg   Take 1 tablet (300 mg) by mouth At Bedtime   Quantity:  90 tablet   Refills:  3         These medicines have changed or have updated prescriptions.        Dose/Directions    * levonorgest-eth estrad -Day 0.15-0.03 &0.01 MG per tablet   Commonly known as:  SEASONIQUE   This may have changed:  Another medication with the same name was added. Make sure you understand how and when to take  each.   Changed by:  Ashley Head MD        Dose:  1 tablet   Take 1 tablet by mouth daily   Refills:  0       * AMETHIA 0.15-0.03 &0.01 MG per tablet   This may have changed:  You were already taking a medication with the same name, and this prescription was added. Make sure you understand how and when to take each.   Used for:  Encounter for contraceptive management, unspecified type   Generic drug:  levonorgest-eth estrad 91-Day   Changed by:  Ashley Head MD        Dose:  1 tablet   Take 1 tablet by mouth daily   Quantity:  91 tablet   Refills:  3       omeprazole 20 MG tablet   This may have changed:  medication strength   Used for:  Gastroesophageal reflux disease, esophagitis presence not specified   Changed by:  Ashley Head MD        Dose:  20 mg   Take 1 tablet (20 mg) by mouth every morning   Quantity:  30 tablet   Refills:  11       * Notice:  This list has 2 medication(s) that are the same as other medications prescribed for you. Read the directions carefully, and ask your doctor or other care provider to review them with you.         Where to get your medicines      These medications were sent to [id number] - [place], [place] - [address]  [address], [facility] [address]     Phone:  [phone]     AMETHIA 0.15-0.03 &0.01 MG per tablet    omeprazole 20 MG tablet    ranitidine 300 MG tablet                Primary Care Provider Office Phone # Fax #    Ashley Head [phone] [address] [place] [address]        Equal Access to Services     [facility] AH: Hadii aad ku hadasho Soomaali, waaxda luqadaha, qaybta kaalmada adeegyada, waxay angel sanchez. So [facility] [phone].    ATENCIÓN: Si habla español, tiene a johnson disposición servicios gratuitos de asistencia lingüística. Llame al [phone].    We comply with applicable federal civil rights laws and [place] laws. We do not  discriminate on the basis of race, color, national origin, age, disability sex, sexual orientation or gender identity.            Thank you!     Thank you for choosing [facility]  for your care. Our goal is always to provide you with excellent care. Hearing back from our patients is one way we can continue to improve our services. Please take a few minutes to complete the written survey that you may receive in the mail after your visit with us. Thank you!             Your Updated Medication List - Protect others around you: Learn how to safely use, store and throw away your medicines at www.disposemymeds.org.          This list is accurate as of: 9/26/17  3:00 PM.  Always use your most recent med list.                   Brand Name Dispense Instructions for use Diagnosis    * levonorgest-eth estrad 91-Day 0.15-0.03 &0.01 MG per tablet    SEASONIQUE     Take 1 tablet by mouth daily        * AMETHIA 0.15-0.03 &0.01 MG per tablet   Generic drug:  levonorgest-eth estrad 91-Day     91 tablet    Take 1 tablet by mouth daily    Encounter for contraceptive management, unspecified type       omeprazole 20 MG tablet     30 tablet    Take 1 tablet (20 mg) by mouth every morning    Gastroesophageal reflux disease, esophagitis presence not specified       ondansetron 4 MG ODT tab    ZOFRAN ODT    10 tablet    Take 1 tablet (4 mg) by mouth every 6 hours as needed for nausea or vomiting        ranitidine 300 MG tablet    ZANTAC    90 tablet    Take 1 tablet (300 mg) by mouth At Bedtime    Gastroesophageal reflux disease, esophagitis presence not specified       * Notice:  This list has 2 medication(s) that are the same as other medications prescribed for you. Read the directions carefully, and ask your doctor or other care provider to review them with you.

## 2017-09-27 LAB
C TRACH DNA SPEC QL NAA+PROBE: NEGATIVE
N GONORRHOEA DNA SPEC QL NAA+PROBE: NEGATIVE
SPECIMEN SOURCE: NORMAL
SPECIMEN SOURCE: NORMAL

## 2017-09-28 LAB
COPATH REPORT: NORMAL
PAP: NORMAL

## 2017-10-09 ENCOUNTER — TELEPHONE (OUTPATIENT)
Dept: GASTROENTEROLOGY | Facility: CLINIC | Age: 27
End: 2017-10-09

## 2017-10-09 NOTE — TELEPHONE ENCOUNTER
Third attempt to reach patient to schedule Colonoscopy. Not Scheduled at [facility]. Left Messages.

## 2018-10-18 DIAGNOSIS — K21.9 GASTROESOPHAGEAL REFLUX DISEASE, ESOPHAGITIS PRESENCE NOT SPECIFIED: ICD-10-CM

## 2018-10-18 NOTE — TELEPHONE ENCOUNTER
"Requested Prescriptions   Pending Prescriptions Disp Refills     ranitidine (ZANTAC) 300 MG tablet [Pharmacy Med Name: RaNITidine HCl Oral Tablet 300 MG]    Last Written Prescription Date:  9/26/2017  Last Fill Quantity: 90,  # refills: 3   Last office visit: 9/26/2017 with prescribing provider:  Ashley Head     Future Office Visit:   Next 5 appointments (look out 90 days)     Nov 19, 2018  3:10 PM CST   PHYSICAL with Ashley Head MD   [facility]    [address] [place] [address]   [phone]                  90 tablet 2     Sig: TAKE ONE TABLET BY MOUTH AT BEDTIME    H2 Blockers Protocol Failed    10/18/2018  7:11 AM       Failed - Recent (12 mo) or future (30 days) visit within the authorizing provider's specialty    Patient had office visit in the last 12 months or has a visit in the next 30 days with authorizing provider or within the authorizing provider's specialty.  See \"Patient Info\" tab in inbasket, or \"Choose Columns\" in Meds & Orders section of the refill encounter.             Passed - Patient is age 12 or older          "

## 2018-10-18 NOTE — TELEPHONE ENCOUNTER
Medication is being filled for 1 time refill only due to:  Patient needs to be seen because it has been more than one year since last visit.     Visit scheduled in November.     Cheryle Isaacs RN -- [facility]

## 2018-11-15 DIAGNOSIS — Z30.9 CONTRACEPTIVE MANAGEMENT: Primary | ICD-10-CM

## 2018-11-15 RX ORDER — LEVONORGESTREL AND ETHINYL ESTRADIOL AND ETHINYL ESTRADIOL 150-30(84)
KIT ORAL
Qty: 28 TABLET | Refills: 0 | Status: SHIPPED | OUTPATIENT
Start: 2018-11-15 | End: 2018-11-20 | Stop reason: ALTCHOICE

## 2018-11-15 NOTE — TELEPHONE ENCOUNTER
Called and left message to return call and schedule appointment due. Patient is due for physical and lab work prior to next refill.    Shaina Smyth MA 4:13 PM 11/15/2018

## 2018-11-15 NOTE — TELEPHONE ENCOUNTER
"Requested Prescriptions   Pending Prescriptions Disp Refills     ASHLYNA 0.15-0.03 &0.01 MG per tablet [Pharmacy Med Name: Ashlyna Oral Tablet 0.15-0.03 &0.01 MG]    Last Written Prescription Date:  9/26/2017  Last Fill Quantity: 91,  # refills: 3   Last office visit: 9/26/2017 with prescribing provider:  Ashley Head     Future Office Visit:   Next 5 appointments (look out 90 days)     Nov 19, 2018  3:10 PM CST   PHYSICAL with Ashley Head MD   [facility]    [address] [place] [address]  [address]  [facility] [address]   [phone]                  91 tablet 2     Sig: take one tablet by mouth daily    Contraceptives Protocol Passed    11/15/2018  7:11 AM       Passed - Patient is not a current smoker if age is 35 or older       Passed - Recent (12 mo) or future (30 days) visit within the authorizing provider's specialty    Patient had office visit in the last 12 months or has a visit in the next 30 days with authorizing provider or within the authorizing provider's specialty.  See \"Patient Info\" tab in inbasket, or \"Choose Columns\" in Meds & Orders section of the refill encounter.             Passed - No active pregnancy on record       Passed - No positive pregnancy test in past 12 months          "

## 2018-11-20 ENCOUNTER — OFFICE VISIT (OUTPATIENT)
Dept: PEDIATRICS | Facility: CLINIC | Age: 28
End: 2018-11-20
Payer: COMMERCIAL

## 2018-11-20 VITALS
DIASTOLIC BLOOD PRESSURE: 74 MMHG | WEIGHT: 293 LBS | RESPIRATION RATE: 16 BRPM | TEMPERATURE: 98.1 F | BODY MASS INDEX: 43.4 KG/M2 | SYSTOLIC BLOOD PRESSURE: 116 MMHG | HEART RATE: 94 BPM | OXYGEN SATURATION: 97 % | HEIGHT: 69 IN

## 2018-11-20 DIAGNOSIS — Z87.19 HISTORY OF DIVERTICULITIS: ICD-10-CM

## 2018-11-20 DIAGNOSIS — Z30.9 ENCOUNTER FOR CONTRACEPTIVE MANAGEMENT, UNSPECIFIED TYPE: ICD-10-CM

## 2018-11-20 DIAGNOSIS — E66.01 MORBID OBESITY (H): ICD-10-CM

## 2018-11-20 DIAGNOSIS — R05.9 COUGH: ICD-10-CM

## 2018-11-20 DIAGNOSIS — K21.9 GASTROESOPHAGEAL REFLUX DISEASE, ESOPHAGITIS PRESENCE NOT SPECIFIED: ICD-10-CM

## 2018-11-20 DIAGNOSIS — Z00.00 HEALTHCARE MAINTENANCE: Primary | ICD-10-CM

## 2018-11-20 PROCEDURE — 99395 PREV VISIT EST AGE 18-39: CPT | Mod: 25 | Performed by: NURSE PRACTITIONER

## 2018-11-20 PROCEDURE — 94640 AIRWAY INHALATION TREATMENT: CPT | Performed by: NURSE PRACTITIONER

## 2018-11-20 PROCEDURE — 99214 OFFICE O/P EST MOD 30 MIN: CPT | Mod: 25 | Performed by: NURSE PRACTITIONER

## 2018-11-20 RX ORDER — AZITHROMYCIN 250 MG/1
TABLET, FILM COATED ORAL
Qty: 6 TABLET | Refills: 0 | Status: SHIPPED | OUTPATIENT
Start: 2018-11-20 | End: 2018-12-31

## 2018-11-20 RX ORDER — NICOTINE POLACRILEX 4 MG/1
20 GUM, CHEWING ORAL EVERY MORNING
Qty: 30 TABLET | Refills: 11 | Status: CANCELLED | OUTPATIENT
Start: 2018-11-20

## 2018-11-20 RX ORDER — ETHINYL ESTRADIOL AND LEVONORGESTREL 150-30(84)
1 KIT ORAL DAILY
Qty: 91 TABLET | Refills: 3 | Status: SHIPPED | OUTPATIENT
Start: 2018-11-20 | End: 2018-12-31

## 2018-11-20 RX ORDER — LEVONORGESTREL / ETHINYL ESTRADIOL AND ETHINYL ESTRADIOL 150-30(84)
1 KIT ORAL DAILY
Qty: 28 TABLET | Refills: 0 | Status: CANCELLED | OUTPATIENT
Start: 2018-11-20

## 2018-11-20 ASSESSMENT — ENCOUNTER SYMPTOMS
SHORTNESS OF BREATH: 0
DIZZINESS: 1
NERVOUS/ANXIOUS: 1
SHORTNESS OF BREATH: 1
CHILLS: 0
ARTHRALGIAS: 1
DYSURIA: 0
BREAST MASS: 0
FEVER: 0
HEADACHES: 1
FREQUENCY: 0
MYALGIAS: 1
COUGH: 1
SORE THROAT: 0
PALPITATIONS: 0
ABDOMINAL PAIN: 1
PARESTHESIAS: 0
HEMATOCHEZIA: 0
DIARRHEA: 0
EYE PAIN: 0
CONSTIPATION: 0
HEARTBURN: 0
HEMATURIA: 0
JOINT SWELLING: 0
WEAKNESS: 1
NAUSEA: 0

## 2018-11-20 NOTE — PROGRESS NOTES
SUBJECTIVE:   CC: Karen Buenrostro is an 28 year old woman who presents for preventive health visit.     Physical   Annual:     Getting at least 3 servings of Calcium per day:  Yes    Bi-annual eye exam:  Yes    Dental care twice a year:  NO    Sleep apnea or symptoms of sleep apnea:  Daytime drowsiness    Diet:  Regular (no restrictions)    Frequency of exercise:  None    Taking medications regularly:  Yes    Medication side effects:  None    Additional concerns today:  No    Concerns today: renew birth control  Current URI  Acute Illness   Acute illness concerns: chest cough  Onset: 2 months    Fever: no     Chills/Sweats: YES- chills    Headache (location?): YES    Sinus Pressure:no    Conjunctivitis:  no    Ear Pain: no    Rhinorrhea: YES- sometimes    Congestion: YES- sometimes    Sore Throat: no     Cough: YES-non-productive    Wheeze: YES    Decreased Appetite: YES    Nausea: YES- when cough too hard    Vomiting: YES- this morning after cough attack    Diarrhea:  no    Dysuria/Freq.: no    Fatigue/Achiness: YES- fatigue, body aches x 1 month    Sick/Strep Exposure: no     Therapies Tried and outcome: dayquil, nyquil, tylenol: Symptoms not alleviated    Patient declines flu vaccine today due to illness.        -------------------------------------    Today's PHQ-2 Score:   PHQ-2 ( 1999 Pfizer) 11/20/2018   Q1: Little interest or pleasure in doing things 1   Q2: Feeling down, depressed or hopeless 1   PHQ-2 Score 2   Q1: Little interest or pleasure in doing things Several days   Q2: Feeling down, depressed or hopeless Several days   PHQ-2 Score 2     Abuse: Current or Past(Physical, Sexual or Emotional)- No  Do you feel safe in your environment - No    Social History   Substance Use Topics     Smoking status: Never Smoker     Smokeless tobacco: Never Used     Alcohol use Yes     Alcohol Use 11/20/2018   If you drink alcohol do you typically have greater than 3 drinks per day OR greater than 7 drinks per  "week? No   No flowsheet data found.    Reviewed orders with patient.  Reviewed health maintenance and updated orders accordingly - Yes  Labs reviewed in EPIC    Mammogram not appropriate for this patient based on age.    Pertinent mammograms are reviewed under the imaging tab.  History of abnormal Pap smear: NO - age 21-29 PAP every 3 years recommended  PAP / HPV 9/26/2017   PAP NIL     Reviewed and updated as needed this visit by clinical staff  Tobacco  Allergies  Meds  Med Hx  Surg Hx  Fam Hx  Soc Hx        Reviewed and updated as needed this visit by Provider          Review of Systems   Constitutional: Negative for chills and fever.   HENT: Positive for congestion. Negative for ear pain, hearing loss and sore throat.    Eyes: Negative for pain and visual disturbance.   Respiratory: Positive for cough. Negative for shortness of breath.    Cardiovascular: Positive for chest pain. Negative for palpitations and peripheral edema.   Gastrointestinal: Positive for abdominal pain. Negative for constipation, diarrhea, heartburn, hematochezia and nausea.   Breasts:  Negative for tenderness, breast mass and discharge.   Genitourinary: Negative for dysuria, frequency, genital sores, hematuria, pelvic pain, urgency, vaginal bleeding and vaginal discharge.   Musculoskeletal: Positive for arthralgias and myalgias. Negative for joint swelling.   Skin: Negative for rash.   Neurological: Positive for dizziness, weakness and headaches. Negative for paresthesias.   Psychiatric/Behavioral: Negative for mood changes. The patient is nervous/anxious.         OBJECTIVE:   /74 (BP Location: Right arm, Cuff Size: Adult Large)  Pulse 94  Temp 98.1  F (36.7  C) (Tympanic)  Resp 16  Ht 5' 9\" (1.753 m)  Wt 300 lb 3.2 oz (136.2 kg)  LMP 11/05/2018 (Approximate)  SpO2 97%  BMI 44.33 kg/m2  Physical Exam  GENERAL: healthy, alert and no distress  EYES: Eyes grossly normal to inspection, PERRL and conjunctivae and sclerae " normal  HENT: ear canals and TM's normal, nose and mouth without ulcers or lesions  NECK: no adenopathy, no asymmetry, masses, or scars and thyroid normal to palpation  RESP: lungs clear to auscultation - no rales, rhonchi or wheezes  CV: regular rate and rhythm, normal S1 S2, no S3 or S4, no murmur, click or rub, no peripheral edema and peripheral pulses strong  ABDOMEN: soft, nontender, no hepatosplenomegaly, no masses and bowel sounds normal  MS: no gross musculoskeletal defects noted, no edema  SKIN: no suspicious lesions or rashes  NEURO: Normal strength and tone, mentation intact and speech normal  PSYCH: mentation appears normal, affect normal/bright    Diagnostic Test Results:  none     ASSESSMENT/PLAN:   1. Healthcare maintenance  - Lipid panel reflex to direct LDL Fasting  - TSH with free T4 reflex  - Glucose  -Referred to endo for weight management.     2. Encounter for contraceptive management, unspecified type  Well controlled. No contraindications. Discussed potentially decreased efficacy with obesity.   - AMETHIA 0.15-0.03 &0.01 MG tablet; Take 1 tablet by mouth daily  Dispense: 91 tablet; Refill: 3    3. Gastroesophageal reflux disease, esophagitis presence not specified  Much improved control on Ranitidine vs PPI. Asymptomatic  -Discussed lifestyle changes.   - ranitidine (ZANTAC) 300 MG tablet; TAKE ONE TABLET BY MOUTH AT BEDTIME  Dispense: 90 tablet; Refill: 3    4. History of diverticulitis  Last year and this year colonoscopy was recommended given history of diverticulitis at a young age. Patient states she can't afford. She is currently asymptomatic. Has no weight loss, fevers, bloody stools, diarrhea, or constipation.     5. BMI 40.0-44.9, adult (H)  Worsening.  - BARIATRIC ADULT REFERRAL    7. Cough  Patient with 2 weeks of non productive cough associated with mild viral URI sx. No Shortness of breath. Does have chronic costochondritis but otherwise no chest pain. No hypoxia, rales, or  "increased work of breathing. DDX includes asthma, bronchics, pneumonia. No response to nebs today and no wheezing heard. Likely viral bronchitis but will cover with ABX given duration of illness.  - azithromycin (ZITHROMAX) 250 MG tablet; Two tablets first day, then one tablet daily for four days.  Dispense: 6 tablet; Refill: 0  - ALBUTEROL/IPRATROPIUM 3ML NEB - .001  - INHALATION/NEBULIZER TREATMENT, INITIAL  - azithromycin (ZITHROMAX) 250 MG tablet; Two tablets first day, then one tablet daily for four days.  Dispense: 6 tablet; Refill: 0  -Asked her to f/u with PCP regarding frequent bronchitis.     COUNSELING:  Reviewed preventive health counseling, as reflected in patient instructions    BP Readings from Last 1 Encounters:   11/20/18 116/74     Estimated body mass index is 44.33 kg/(m^2) as calculated from the following:    Height as of this encounter: 5' 9\" (1.753 m).    Weight as of this encounter: 300 lb 3.2 oz (136.2 kg).      Weight management plan: Patient referred to endocrine and/or weight management specialty     reports that she has never smoked. She has never used smokeless tobacco.      Counseling Resources:  ATP IV Guidelines  Pooled Cohorts Equation Calculator  Breast Cancer Risk Calculator  FRAX Risk Assessment  ICSI Preventive Guidelines  Dietary Guidelines for Americans, 2010  USDA's MyPlate  ASA Prophylaxis  Lung CA Screening    ENRIQUE Richards [facility]  "

## 2018-11-20 NOTE — LETTER
[address]  [address]  [facility] [address]  Phone: [phone]  Fax: [phone]    November 20, 2018        Karen Buenrostro  [id number] [place] [id number]          To whom it may concern:    RE: Karen Buenrostro    Patient was seen and treated today at our clinic.  Please excuse her from work 11/20 due to illness.    Please contact me for questions or concerns.      Sincerely,        ENRIQUE Richards CNP

## 2018-11-20 NOTE — MR AVS SNAPSHOT
After Visit Summary   11/20/2018    Karen Buenrostro    MRN: [id number]           Patient Information     Date Of Birth          1990        Visit Information        Provider Department      11/20/2018 11:20 AM Ellie Perez APRN [facility]        Today's Diagnoses     Healthcare maintenance    -  1    Encounter for contraceptive management, unspecified type        Gastroesophageal reflux disease, esophagitis presence not specified        History of diverticulitis        BMI 40.0-44.9, adult (H)        Morbid obesity (H)        Cough          Care Instructions    1. I think you probably have bronchitis. Please take the antibiotics to be sure it doesn't turn into pneumonia  2. Fluids, rest  3. Come see me Monday if not feeling better. Please be seen urgently for shortness of breath, etc  4. Please follow up with Dr. Head to discuss frequent bronchitis      Preventive Health Recommendations  Female Ages 26 - 39  Yearly exam:   See your health care provider every year in order to    Review health changes.     Discuss preventive care.      Review your medicines if you your doctor has prescribed any.    Until age 30: Get a Pap test every three years (more often if you have had an abnormal result).    After age 30: Talk to your doctor about whether you should have a Pap test every 3 years or have a Pap test with HPV screening every 5 years.   You do not need a Pap test if your uterus was removed (hysterectomy) and you have not had cancer.  You should be tested each year for STDs (sexually transmitted diseases), if you're at risk.   Talk to your provider about how often to have your cholesterol checked.  If you are at risk for diabetes, you should have a diabetes test (fasting glucose).  Shots: Get a flu shot each year. Get a tetanus shot every 10 years.   Nutrition:     Eat at least 5 servings of fruits and vegetables each day.    Eat whole-grain bread, whole-wheat  pasta and brown rice instead of white grains and rice.    Get adequate Calcium and Vitamin D.     Lifestyle    Exercise at least 150 minutes a week (30 minutes a day, 5 days of the week). This will help you control your weight and prevent disease.    Limit alcohol to one drink per day.    No smoking.     Wear sunscreen to prevent skin cancer.    See your dentist every six months for an exam and cleaning.            Follow-ups after your visit        Additional Services     BARIATRIC ADULT REFERRAL       Your provider has referred you to: HE: [facility] [phone] [email or web address]    Please be aware that coverage of these services is subject to the terms and limitations of your health insurance plan.  Call member services at your health plan with any benefit or coverage questions.      Please bring the following with you to your appointment:      (1) List of current medications   (2) This referral request   (3) Any documents/labs given to you for this referral                  Follow-up notes from your care team     Return for duoneb.      Who to contact     If you have questions or need follow up information about today's clinic visit or your schedule please contact [facility] directly at [phone].  Normal or non-critical lab and imaging results will be communicated to you by MyChart, letter or phone within 4 business days after the clinic has received the results. If you do not hear from us within 7 days, please contact the clinic through [employer] or phone. If you have a critical or abnormal lab result, we will notify you by phone as soon as possible.  Submit refill requests through [employer] or call your pharmacy and they will forward the refill request to us. Please allow 3 business days for your refill to be completed.          Additional Information  "About Your Visit        [employer] Information     [facility] lets you send messages to your doctor, view your test results, renew your prescriptions, schedule appointments and more. To sign up, go to [email or web address] . Click on \"Log in\" on the left side of the screen, which will take you to the Welcome page. Then click on \"Sign up Now\" on the right side of the page.     You will be asked to enter the access code listed below, as well as some personal information. Please follow the directions to create your username and password.     Your access code is: [email or web address]  Expires: 2019 12:12 PM     Your access code will  in 90 days. If you need help or a new code, please call your [place] clinic or [phone].        Care EveryWhere ID     This is your Care EveryWhere ID. This could be used by other organizations to access your [place] medical records  [id number]        Your Vitals Were     Pulse Temperature Respirations Height Last Period Pulse Oximetry    94 98.1  F (36.7  C) (Tympanic) 16 5' 9\" (1.753 m) 2018 (Approximate) 97%    BMI (Body Mass Index)                   44.33 kg/m2            Blood Pressure from Last 3 Encounters:   18 116/74   17 102/80   17 119/75    Weight from Last 3 Encounters:   18 300 lb 3.2 oz (136.2 kg)   17 (P) 272 lb 1.6 oz (123.4 kg)   17 273 lb 9.6 oz (124.1 kg)              We Performed the Following     BARIATRIC ADULT REFERRAL     Glucose     Lipid panel reflex to direct LDL Fasting     TSH with free T4 reflex          Today's Medication Changes          These changes are accurate as of 18 12:12 PM.  If you have any questions, ask your nurse or doctor.               Start taking these medicines.        Dose/Directions    azithromycin 250 MG tablet   Commonly known as:  ZITHROMAX   Used for:  Cough   Started by:  Ellie Perez APRN CNP        Two tablets first day, then one tablet daily for four days. "   Quantity:  6 tablet   Refills:  0         These medicines have changed or have updated prescriptions.        Dose/Directions    AMETHIA 0.15-0.03 &0.01 MG tablet   This may have changed:  Another medication with the same name was removed. Continue taking this medication, and follow the directions you see here.   Used for:  Encounter for contraceptive management, unspecified type   Generic drug:  levonorgest-eth estrad 91-Day   Changed by:  Ellie Perez APRN CNP        Dose:  1 tablet   Take 1 tablet by mouth daily   Quantity:  91 tablet   Refills:  3       ranitidine 300 MG tablet   Commonly known as:  ZANTAC   This may have changed:  See the new instructions.   Used for:  Gastroesophageal reflux disease, esophagitis presence not specified   Changed by:  Ellie Perez APRN CNP        TAKE ONE TABLET BY MOUTH AT BEDTIME   Quantity:  90 tablet   Refills:  3         Stop taking these medicines if you haven't already. Please contact your care team if you have questions.     omeprazole 20 MG tablet   Stopped by:  Ellie Perez APRN CNP                Where to get your medicines      These medications were sent to [facility] - [address]   [address], [place] [address]     Phone:  [phone]     AMETHIA 0.15-0.03 &0.01 MG tablet    azithromycin 250 MG tablet    ranitidine 300 MG tablet                Primary Care Provider Office Phone # Fax #    Ashley Head [phone]       [address] [place] [address]        Equal Access to Services     [facility] AH: Hadii leigh garayo Somak, waaxda luqadaha, qaybta kaalmada adeegyada, erica sanchez. So [facility] [phone].    ATENCIÓN: Si habla español, tiene a johnson disposición servicios gratuitos de asistencia lingüística. Jayson al [phone].    We comply with applicable federal civil rights laws and [place]  laws. We do not discriminate on the basis of race, color, national origin, age, disability, sex, sexual orientation, or gender identity.            Thank you!     Thank you for choosing [facility]  for your care. Our goal is always to provide you with excellent care. Hearing back from our patients is one way we can continue to improve our services. Please take a few minutes to complete the written survey that you may receive in the mail after your visit with us. Thank you!             Your Updated Medication List - Protect others around you: Learn how to safely use, store and throw away your medicines at www.disposemymeds.org.          This list is accurate as of 11/20/18 12:12 PM.  Always use your most recent med list.                   Brand Name Dispense Instructions for use Diagnosis    AMETHIA 0.15-0.03 &0.01 MG tablet   Generic drug:  levonorgest-eth estrad 91-Day     91 tablet    Take 1 tablet by mouth daily    Encounter for contraceptive management, unspecified type       azithromycin 250 MG tablet    ZITHROMAX    6 tablet    Two tablets first day, then one tablet daily for four days.    Cough       ranitidine 300 MG tablet    ZANTAC    90 tablet    TAKE ONE TABLET BY MOUTH AT BEDTIME    Gastroesophageal reflux disease, esophagitis presence not specified

## 2018-11-20 NOTE — NURSING NOTE
The following nebulizer treatment was given:     MEDICATION: Duoneb  : Ritedose  LOT #: [id number]  EXPIRATION DATE:  4/30/20  NDC # 04381-786-65     Nebulizer Start Time:  12:00pm  Nebulizer Stop Time:  12:07pm  See Vital Signs Flowsheet  Sri Marroquin CMA

## 2018-12-31 ENCOUNTER — OFFICE VISIT (OUTPATIENT)
Dept: PEDIATRICS | Facility: CLINIC | Age: 28
End: 2018-12-31
Payer: COMMERCIAL

## 2018-12-31 VITALS
DIASTOLIC BLOOD PRESSURE: 70 MMHG | OXYGEN SATURATION: 97 % | TEMPERATURE: 97.9 F | HEIGHT: 69 IN | HEART RATE: 80 BPM | WEIGHT: 293 LBS | BODY MASS INDEX: 43.4 KG/M2 | SYSTOLIC BLOOD PRESSURE: 100 MMHG

## 2018-12-31 DIAGNOSIS — Z30.9 ENCOUNTER FOR CONTRACEPTIVE MANAGEMENT, UNSPECIFIED TYPE: ICD-10-CM

## 2018-12-31 DIAGNOSIS — Z23 NEED FOR PROPHYLACTIC VACCINATION AND INOCULATION AGAINST INFLUENZA: ICD-10-CM

## 2018-12-31 DIAGNOSIS — J45.30 MILD PERSISTENT ASTHMA WITHOUT COMPLICATION: Primary | ICD-10-CM

## 2018-12-31 DIAGNOSIS — K21.9 GASTROESOPHAGEAL REFLUX DISEASE, ESOPHAGITIS PRESENCE NOT SPECIFIED: ICD-10-CM

## 2018-12-31 PROCEDURE — 99214 OFFICE O/P EST MOD 30 MIN: CPT | Performed by: INTERNAL MEDICINE

## 2018-12-31 RX ORDER — ETHINYL ESTRADIOL AND LEVONORGESTREL 150-30(84)
1 KIT ORAL DAILY
Qty: 91 TABLET | Refills: 3 | Status: CANCELLED | OUTPATIENT
Start: 2018-12-31

## 2018-12-31 RX ORDER — ETHINYL ESTRADIOL AND LEVONORGESTREL 150-30(84)
1 KIT ORAL DAILY
Qty: 91 TABLET | Refills: 3 | Status: SHIPPED | OUTPATIENT
Start: 2018-12-31 | End: 2019-07-31

## 2018-12-31 RX ORDER — ALBUTEROL SULFATE 90 UG/1
1-2 AEROSOL, METERED RESPIRATORY (INHALATION) EVERY 4 HOURS PRN
Qty: 1 INHALER | Refills: 1 | Status: SHIPPED | OUTPATIENT
Start: 2018-12-31 | End: 2019-07-31

## 2018-12-31 ASSESSMENT — ENCOUNTER SYMPTOMS
JOINT SWELLING: 0
DYSURIA: 0
DIZZINESS: 0
PARESTHESIAS: 1
ABDOMINAL PAIN: 0
CONSTIPATION: 0
ARTHRALGIAS: 1
HEMATURIA: 0
SORE THROAT: 0
MYALGIAS: 1
COUGH: 1
HEADACHES: 1
FEVER: 0
HEARTBURN: 0
DIARRHEA: 0
HEMATOCHEZIA: 0
FREQUENCY: 0
WEAKNESS: 1
SHORTNESS OF BREATH: 0
PALPITATIONS: 0
NAUSEA: 0
CHILLS: 0
NERVOUS/ANXIOUS: 0
BREAST MASS: 0
EYE PAIN: 0

## 2018-12-31 ASSESSMENT — MIFFLIN-ST. JEOR: SCORE: 2141.56

## 2018-12-31 NOTE — PATIENT INSTRUCTIONS
Patient Education     Understanding Asthma    Asthma causes swelling and narrowing of the airways in your lungs. Medical experts are not exactly sure what causes asthma. It is believed to be caused by a mix of inherited and environmental factors.  Healthy lungs  Inside the lungs there are branching airways made of stretchy tissue. Each airway is wrapped with bands of muscle. The airways become more narrow as they go deeper into the lungs. The smallest airways end in clusters of tiny balloon-like air sacs (alveoli). These clusters are surrounded by blood vessels. When you breathe in (inhale), air enters the lungs. It travels down through the airways until it reaches the air sacs. When you breathe out (exhale), air travels up through the airways and out of the lungs. The airways produce mucus that traps particles you breathe in. Normally, the mucus is then swept out of the lungs by tiny hairs (cilia) that line the airways. The mucus is swallowed or coughed up.  What the lungs do  The air you inhale contains oxygen. When oxygen reaches the air sacs, it passes into the blood vessels surrounding the sacs. Your blood then delivers oxygen to all of your cells. As you exhale, carbon dioxide is removed in a similar way from the blood in the air sacs, and from the body.  When you have asthma  People with asthma have very sensitive airways. This means the airways react to certain things called triggers (such as pollen, dust, or smoke) and become swollen and narrowed. Inflammation makes the airways swollen and narrowed. This is a long-lasting (chronic) problem. The airways may not always be narrowed enough to notice breathing problems.  Symptoms of chronic inflammation:     Coughing    Chest tightness    Shortness of breath    Wheezing (a whistling noise, especially when breathing out)    Low energy or feeling tired  In some people, over time chronic mild inflammation can lead to lasting (permanent) scarring of airways and  loss of lung function.  Moderate flare-ups  When sensitive airways are irritated by a trigger, the muscles around the airways tighten. The lining of the airways swells. Thick, sticky mucus increases and partly clogs the airways. All of this makes you work harder to keep breathing.  Symptoms of moderate flare-ups:    Coughing, especially at night    Getting tired or out of breath easily    Wheezing    Chest tightness    Faster breathing when at rest  Severe flare-ups  Severe flare-ups are life-threatening. In a severe flare-up, the muscle tightening, swelling, and mucus production are even worse. It s very hard to breathe. Your body can't get enough oxygen and can't remove carbon dioxide. Waste gas is trapped in the alveoli, and gas exchange can t occur. The body is not getting enough oxygen. Without oxygen, body tissues, especially brain tissue, begin to get damaged. If this goes on for long, it can lead to severe brain damage or death.  Call 911 (or have someone call for you) if you have any of these symptoms and they are not relieved right away by taking your quick-relief medicine as prescribed:    Severe trouble breathing    Too short of breath to talk or walk    Lips or fingers turning blue    Feeling lightheaded or dizzy, as though you are about to pass out    Peak flow less than 50% of your personal best, if you use peak flow monitoring  Asthma is a long-term condition. So it s important to work with your healthcare provider to manage it. If you smoke, get help to quit. Know your triggers and figure out how to avoid them. It s also very important to take your medicines as directed. That means taking them even when you feel good.  Date Last Reviewed: 12/1/2016 2000-2018 [employer]. [address] [place] [address], [place] [address]. All rights reserved. This information is not intended as a substitute for professional medical care. Always follow your healthcare professional's instructions.

## 2018-12-31 NOTE — PROGRESS NOTES
SUBJECTIVE:   CC: Karen Buenrostro is an 28 year old woman who presents for follow up:      Continues to have cough, but now is dry. Cough usually happens in the morning. Starts with a tickle in her throat and feels like she has to clear her throat, then gets a strong urge to cough. Sometimes coughing fits last up to 5 minutes. Cough lasts overall a couple hours in the morning.  Not bothered much when going to bed at night. When she took the zithromax, she started coughing up mucous. Off and on congestion and runny nose, usually in the morning. Was wheezing before the zithromax, now feels she can breathe fine. No history of asthma. When she goes into the freezer at work, her chest gets tight and she comes out, uses her inhaler, and then feels better.     3-4 times a year gets colds that go into her chest, last a few weeks, wheezing. Has had albuterol in the past, which helps.     Does have cats at home, and they sleep in her room, but not on her bed.    Reports her reflux is well-controlled, she does not have heartburn symptoms on ranitidine, but did on prilosec.     Today's PHQ-2 Score:   PHQ-2 ( 1999 Pfizer) 12/31/2018   Q1: Little interest or pleasure in doing things 1   Q2: Feeling down, depressed or hopeless 0   PHQ-2 Score 1   Q1: Little interest or pleasure in doing things Several days   Q2: Feeling down, depressed or hopeless Not at all   PHQ-2 Score 1       Abuse: Current or Past(Physical, Sexual or Emotional)- No  Do you feel safe in your environment? Yes    Social History     Tobacco Use     Smoking status: Never Smoker     Smokeless tobacco: Never Used   Substance Use Topics     Alcohol use: Yes     Alcohol Use 12/31/2018   If you drink alcohol do you typically have greater than 3 drinks per day OR greater than 7 drinks per week? No       Reviewed orders with patient.  Reviewed health maintenance and updated orders accordingly - Yes  BP Readings from Last 3 Encounters:   12/31/18 100/70   11/20/18  "116/74   09/26/17 102/80    Wt Readings from Last 3 Encounters:   12/31/18 134.7 kg (297 lb)   11/20/18 136.2 kg (300 lb 3.2 oz)   09/26/17 (P) 123.4 kg (272 lb 1.6 oz)                  Patient Active Problem List   Diagnosis     History of diverticulitis     Morbid obesity (H)     Past Surgical History:   Procedure Laterality Date     CHOLECYSTECTOMY         Social History     Tobacco Use     Smoking status: Never Smoker     Smokeless tobacco: Never Used   Substance Use Topics     Alcohol use: Yes     Family History   Problem Relation Age of Onset     Family History Negative Father      Family History Negative Mother          Reviewed and updated as needed this visit by clinical staff  Tobacco  Allergies  Meds  Med Hx  Surg Hx  Fam Hx  Soc Hx        Reviewed and updated as needed this visit by Provider            Review of Systems   Constitutional: Negative for chills and fever.   HENT: Negative for congestion, ear pain, hearing loss and sore throat.    Eyes: Negative for pain and visual disturbance.   Respiratory: Positive for cough. Negative for shortness of breath.    Cardiovascular: Negative for chest pain, palpitations and peripheral edema.   Gastrointestinal: Negative for abdominal pain, constipation, diarrhea, heartburn, hematochezia and nausea.   Breasts:  Negative for tenderness, breast mass and discharge.   Genitourinary: Negative for dysuria, frequency, genital sores, hematuria, pelvic pain, urgency, vaginal bleeding and vaginal discharge.   Musculoskeletal: Positive for arthralgias and myalgias. Negative for joint swelling.   Skin: Negative for rash.   Neurological: Positive for weakness, headaches and paresthesias. Negative for dizziness.   Psychiatric/Behavioral: Positive for mood changes. The patient is not nervous/anxious.      None of the above symptoms are new.     OBJECTIVE:   /70 (Cuff Size: Adult Large)   Pulse 80   Temp 97.9  F (36.6  C) (Oral)   Ht 1.753 m (5' 9\")   Wt 134.7 " kg (297 lb)   SpO2 97%   BMI 43.86 kg/m    Physical Exam  GENERAL: healthy, alert and no distress  NECK: no adenopathy, no asymmetry, masses, or scars and thyroid normal to palpation  RESP: lungs clear to auscultation - no rales, rhonchi or wheezes, cough triggered with forced expiration.  CV: regular rate and rhythm, normal S1 S2, no S3 or S4, no murmur, click or rub, no peripheral edema and peripheral pulses strong  ABDOMEN: soft, nontender, no hepatosplenomegaly, no masses and bowel sounds normal  MS: no gross musculoskeletal defects noted, no edema    Diagnostic Test Results:  none     ASSESSMENT/PLAN:   1. Mild persistent asthma without complication  For many years has had prolonged cough and wheezing with URI, also notes cough when exposed to cold air, with the above symptoms responding well with albuterol. I do think she likely has mild persistent asthma, especially given her consistent symptoms when exposed to cold air at work. Discussed option to start preventive inhaler, which she would like to do. Discussed risk of thrush. AAP given and reviewed with her. I have also asked her to see asthma/allergy to see if allergies are contributing.  - fluticasone (ARNUITY ELLIPTA) 100 MCG/ACT inhaler; Inhale 1 puff into the lungs daily  Dispense: 90 each; Refill: 1  - ALLERGY/ASTHMA ADULT REFERRAL  - albuterol (PROAIR HFA/PROVENTIL HFA/VENTOLIN HFA) 108 (90 Base) MCG/ACT inhaler; Inhale 1-2 puffs into the lungs every 4 hours as needed for shortness of breath / dyspnea or wheezing  Dispense: 1 Inhaler; Refill: 1    2. Gastroesophageal reflux disease, esophagitis presence not specified  Discussed that GERD can cause or worsen asthma. For now will continue ranitidine, but would ask her to discuss with allergy/asthma specialist.     3. Encounter for contraceptive management, unspecified type  Will re-send. Previous rx pharmacy has with no refills.    4. Need for prophylactic vaccination and inoculation against  "influenza    - FLU VACCINE, SPLIT VIRUS, IM (QUADRIVALENT) [25170]- >3 YRS  - Vaccine Administration, Initial [16860]      BP Readings from Last 1 Encounters:   12/31/18 100/70     Estimated body mass index is 43.86 kg/m  as calculated from the following:    Height as of this encounter: 1.753 m (5' 9\").    Weight as of this encounter: 134.7 kg (297 lb).    Counseling Resources:  ATP IV Guidelines  Pooled Cohorts Equation Calculator  Breast Cancer Risk Calculator  FRAX Risk Assessment  ICSI Preventive Guidelines  Dietary Guidelines for Americans, 2010  USDA's MyPlate  ASA Prophylaxis  Lung CA Screening    Ashley Head MD  [facility]  Injectable Influenza Immunization Documentation    1.  Is the person to be vaccinated sick today?   No    2. Does the person to be vaccinated have an allergy to a component   of the vaccine?   No  Egg Allergy Algorithm Link    3. Has the person to be vaccinated ever had a serious reaction   to influenza vaccine in the past?   No    4. Has the person to be vaccinated ever had Guillain-Barré syndrome?   No    Form completed by Bess Lindsey LPN           "

## 2018-12-31 NOTE — LETTER
My Asthma Action Plan  Name: Karen Buenrostro   YOB: 1990  Date: 12/31/2018   My doctor: Ashley Head MD   [facility]        My Control Medicine: Fluticasone furoate (Arnuity Ellipta) -  100 mcg 1 puff daily  My Rescue Medicine: Albuterol (Proair/Ventolin/Proventil) inhaler as needed   My Asthma Severity: mild persistent  Avoid your asthma triggers: upper respiratory infections and cold air               GREEN ZONE   Good Control    I feel good    No cough or wheeze    Can work, sleep and play without asthma symptoms       Take your asthma control medicine every day.     1. If exercise triggers your asthma, take your rescue medication    15 minutes before exercise or sports, and    During exercise if you have asthma symptoms  2. Spacer to use with inhaler: If you have a spacer, make sure to use it with your inhaler             YELLOW ZONE Getting Worse  I have ANY of these:    I do not feel good    Cough or wheeze    Chest feels tight    Wake up at night   1. Keep taking your Green Zone medications  2. Start taking your rescue medicine:    every 20 minutes for up to 1 hour. Then every 4 hours for 24-48 hours.  3. If you stay in the Yellow Zone for more than 12-24 hours, contact your doctor.  4. If you do not return to the Green Zone in 12-24 hours or you get worse, start taking your oral steroid medicine if prescribed by your provider.           RED ZONE Medical Alert - Get Help  I have ANY of these:    I feel awful    Medicine is not helping    Breathing getting harder    Trouble walking or talking    Nose opens wide to breathe       1. Take your rescue medicine NOW  2. If your provider has prescribed an oral steroid medicine, start taking it NOW  3. Call your doctor NOW  4. If you are still in the Red Zone after 20 minutes and you have not reached your doctor:    Take your rescue medicine again and    Call 911 or go to the emergency room right away    See your  regular doctor within 2 weeks of an Emergency Room or Urgent Care visit for follow-up treatment.          Annual Reminders:  Meet with Asthma Educator,  Flu Shot in the Fall, consider Pneumonia Vaccination for patients with asthma (aged 19 and older).    Pharmacy:    [facility] [place], [place] - [address] [id number] - [place], [place] - [address]  [id number] [address]                      Asthma Triggers  How To Control Things That Make Your Asthma Worse    Triggers are things that make your asthma worse.  Look at the list below to help you find your triggers and what you can do about them.  You can help prevent asthma flare-ups by staying away from your triggers.      Trigger                                                          What you can do   Cigarette Smoke  Tobacco smoke can make asthma worse. Do not allow smoking in your home, car or around you.  Be sure no one smokes at a child s day care or school.  If you smoke, ask your health care provider for ways to help you quit.  Ask family members to quit too.  Ask your health care provider for a referral to Quit Plan to help you quit smoking, or call 2-036-115-PLAN.     Colds, Flu, Bronchitis  These are common triggers of asthma. Wash your hands often.  Don t touch your eyes, nose or mouth.  Get a flu shot every year.     Dust Mites  These are tiny bugs that live in cloth or carpet. They are too small to see. Wash sheets and blankets in hot water every week.   Encase pillows and mattress in dust mite proof covers.  Avoid having carpet if you can. If you have carpet, vacuum weekly.   Use a dust mask and HEPA vacuum.   Pollen and Outdoor Mold  Some people are allergic to trees, grass, or weed pollen, or molds. Try to keep your windows closed.  Limit time out doors when pollen count is high.   Ask you health care provider about taking medicine during allergy  season.     Animal Dander  Some people are allergic to skin flakes, urine or saliva from pets with fur or feathers. Keep pets with fur or feathers out of your home.    If you can t keep the pet outdoors, then keep the pet out of your bedroom.  Keep the bedroom door closed.  Keep pets off cloth furniture and away from stuffed toys.     Mice, Rats, and Cockroaches  Some people are allergic to the waste from these pests.   Cover food and garbage.  Clean up spills and food crumbs.  Store grease in the refrigerator.   Keep food out of the bedroom.   Indoor Mold  This can be a trigger if your home has high moisture. Fix leaking faucets, pipes, or other sources of water.   Clean moldy surfaces.  Dehumidify basement if it is damp and smelly.   Smoke, Strong Odors, and Sprays  These can reduce air quality. Stay away from strong odors and sprays, such as perfume, powder, hair spray, paints, smoke incense, paint, cleaning products, candles and new carpet.   Exercise or Sports  Some people with asthma have this trigger. Be active!  Ask your doctor about taking medicine before sports or exercise to prevent symptoms.    Warm up for 5-10 minutes before and after sports or exercise.     Other Triggers of Asthma  Cold air:  Cover your nose and mouth with a scarf.  Sometimes laughing or crying can be a trigger.  Some medicines and food can trigger asthma.

## 2019-01-08 PROBLEM — J45.30 MILD PERSISTENT ASTHMA WITHOUT COMPLICATION: Status: ACTIVE | Noted: 2018-12-31

## 2019-01-08 PROBLEM — J45.30 MILD PERSISTENT ASTHMA WITHOUT COMPLICATION: Status: ACTIVE | Noted: 2019-01-08

## 2019-04-29 ENCOUNTER — HOSPITAL ENCOUNTER (EMERGENCY)
Facility: CLINIC | Age: 29
Discharge: HOME | End: 2019-04-29
Admitting: PHYSICIAN ASSISTANT

## 2019-04-29 VITALS
TEMPERATURE: 99.1 F | OXYGEN SATURATION: 99 % | HEART RATE: 90 BPM | SYSTOLIC BLOOD PRESSURE: 151 MMHG | WEIGHT: 280 LBS | HEIGHT: 68 IN | DIASTOLIC BLOOD PRESSURE: 84 MMHG | RESPIRATION RATE: 18 BRPM | BODY MASS INDEX: 42.44 KG/M2

## 2019-04-29 DIAGNOSIS — M54.2 NECK PAIN: ICD-10-CM

## 2019-04-29 DIAGNOSIS — S09.90XA CLOSED HEAD INJURY, INITIAL ENCOUNTER: ICD-10-CM

## 2019-04-29 DIAGNOSIS — M54.9 BILATERAL BACK PAIN, UNSPECIFIED BACK LOCATION, UNSPECIFIED CHRONICITY: ICD-10-CM

## 2019-04-29 PROCEDURE — 99282 EMERGENCY DEPT VISIT SF MDM: CPT

## 2019-04-29 RX ORDER — CYCLOBENZAPRINE HCL 10 MG
10 TABLET ORAL 3 TIMES DAILY PRN
Qty: 20 TABLET | Refills: 0 | Status: SHIPPED | OUTPATIENT
Start: 2019-04-29 | End: 2019-06-11

## 2019-04-29 ASSESSMENT — ENCOUNTER SYMPTOMS
NECK PAIN: 1
NAUSEA: 1
SHORTNESS OF BREATH: 1
BACK PAIN: 1
NUMBNESS: 0
HEADACHES: 1

## 2019-04-29 ASSESSMENT — MIFFLIN-ST. JEOR: SCORE: 2048.57

## 2019-04-29 NOTE — ED PROVIDER NOTES
"  History     Chief Complaint:  Fall     HPI   Karen Buenrostro is a 28 year old female who presents with neck pain and headache after falling today. 45 minutes ago she fell backwards off of a 4-foot stepladder in a freezer while at work and landed on her back. Her head and shoulders hit the floor first, but she did not lose consciousness. She originally had some shortness of breath but that feeling of having the wind knocked out of her was resolved. She notes nausea and a headache which she describes as a pressure. She has a history of migraines but she hasn't had one recently. She had a few concussions, the last of which was one year ago. She states this feels similar. Patient denies chest pain, bowel or bladder incontinence and numbness and tingling in her extremities or groin.     Allergies:  No known drug allergies.    Medications:    Albuterol   Amethia   Fluticasone  Ranitidine      Past Medical History:    Asthma  Diverticulitis   Duodenitis  Fatty liver disease  Obesity  Post concussion syndrome    Past Surgical History:    Cholecystectomy     Family History:    History reviewed. No pertinent family history.    Social History:  Presents to the ED alone.   Tobacco Use: Never  Alcohol Use: Occasionally   PCP: Ashley Head  Marital Status:  Single [1]     Review of Systems   Eyes: Positive for visual disturbance.   Respiratory: Positive for shortness of breath.    Cardiovascular: Negative for chest pain.   Gastrointestinal: Positive for nausea.   Musculoskeletal: Positive for back pain and neck pain.   Neurological: Positive for headaches. Negative for numbness.        Negative for bowel or bladder incontinence.    All other systems reviewed and are negative.    Physical Exam   First Vitals:  Patient Vitals for the past 24 hrs:   BP Temp Temp src Pulse Resp SpO2 Height Weight   04/29/19 1534 -- -- -- 90 18 99 % -- --   04/29/19 1438 151/84 99.1  F (37.3  C) Oral 95 20 97 % 1.727 m (5' 8\") 127 kg (280 " lb)     Physical Exam  General: Well appearing, well nourished. Normal mood and affect. Resting comfortably on the chair.   Skin: No ecchymosis or contusions, abrasions, lacerations.   HEENT: Head: Normocephalic, no visible or palpable masses. No raccoon eyes or green sign.    Eyes: Conjunctiva clear, PERRL, EOMs intact.   Ears: EACs clear, TMs translucent.   Nose: No septal hematoma or signs of epistaxis.   Throat/pharynx: Mucous membranes moist, no mucosal lesions or lacerations.   Cardiac: Normal rate and regular rhythm, no murmur or gallop.   Lungs: Clear to auscultation.  Abdomen: No seatbelt sign. Abdomen soft, non-tender. No rebound tenderness of guarding.   Musculoskeletal: Bilateral lumbar paraspinous muscle tensioning and discomfort. No cervical, thoracic, lumbar spine tenderness to palpation. Full ROM of neck without pain. No tenderness and good strength throughout shoulders, upper extremities, hips, lower extremities. Normal gait and station.   Neurologic: Oriented x 3. GCS: 15. CN II-XII intact. Normal finger to nose and rapid hand movements. Normal sensation throughout upper and lower extremities.   Psychiatric: Intact recent and remote memory, judgment and insight, normal mood and affect.     Emergency Department Course     Emergency Department Course:  The patient arrived in triage where vitals were measured and recorded.   The patient was then escorted back to the emergency department.   The patient's medical records were reviewed.  Nursing notes and vitals were reviewed.  1511: I performed an exam of the patient as documented above.  The above workup was undertaken.  Findings and plan explained to the Patient. Patient discharged home, status improved, with instructions regarding supportive care, medications, and reasons to return as well as the importance of close follow-up was reviewed. Patient was prescribed Flexeril.     Impression & Plan      Medical Decision Making:  Karen Buenrostro is a  28 year old female who presents for evaluation after fall with trauma to the head. Details of the patent's history can be noted in the HPI. Upon my exam, the patient appeared well and was neurologically intact. No signs of open wound, no visual or palpable deformities or edema. Exam most consistent with head contusion.  Symptoms also consistent with likely mild concussion.  The patient's head to toe trauma exam was otherwise negative for any other serious injuries or disease processes of the head, neck, chest, abdomen, extremities, pelvis.  Full range of motion of neck without discomfort. No cervical, thoracic, spinal tenderness. No signs of laceration.  Doubt underlying skull fracture. She has a history and clinical exam consistent with contusion to head and concussion.The differential diagnosis includes skull fracture, epidural hematoma, subdural hematoma, intracerebral hemorrhage, and traumatic subarachnoid hemorrhage; all of these are highly unlikely in this clinical setting.  By the [place] CT rules they do not warrant head ct imaging and discussed risk/benefit ratio with patient in detail.    The patient's will return to the ED for any red flag signs, including change in behavior, severe headache, drowsiness, seizures, vomiting, etc.  Home precautions and symptomatic care discussed.  We also discussed second impact syndrome. Brain rest also advised. Patient will have physical and mental rest. Patient will not go to work until they are asymptomatic.The patient will be rechecked by their primary care provider within the next 2-3 days. Patient was given information on head injury and concussion.   Patient also sustained back pain in this fall. Differential diagnosis includes muscular strain, cauda equina syndrome, epidural hematoma, epidural abscess, disc herniation, etc. Patient's history and presentation seemed consistent muscular spasm. Paraspinal muscle tenderness was present on exam and he has a  history of MSK strain.The neurological exam is normal. There is no current evidence of radiculopathy or myelopathy. I did not feel that further intervention was needed as the patient did not have fever, bowel or bladder incontinence, groin numbness or tingling, lower leg weakness which would be more concerning for the differentials listed above. There was no focal trauma, therefore no x-ray were necessary. I felt comfortable with their discharge.They were encouraged to avoid heavy lifting, bending or twisting. She was told to return to the ED for increasing pain, numbness, weakness, or bowel or bladder dysfunction. The patient was discharged with medications below, to be used as directed. They were given neck and back specialty information and will follow-up within the next few days. All questions were answered prior to the patient's discharge. She was in agreement with the plan stated above.     Diagnosis:    ICD-10-CM    1. Closed head injury, initial encounter S09.90XA    2. Neck pain M54.2    3. Bilateral back pain, unspecified back location, unspecified chronicity M54.9        Disposition:  Discharged to home.     Discharge Medications:     Medication List      Started    cyclobenzaprine 10 MG tablet  Commonly known as:  FLEXERIL  10 mg, Oral, 3 TIMES DAILY PRN              Riri YOUNG, ny serving as a scribe on 4/29/2019 at 3:11 PM to personally document services performed by Clau Johnson PA-C, based on my observations and the provider's statements to me.    EMERGENCY DEPARTMENT      This was created at least in part with a voice recognition software. Mistakes/typos may be present.        Clau Johnson PA  04/29/19 2928

## 2019-04-29 NOTE — ED AVS SNAPSHOT
Emergency Department  [address] [facility] [address] [address]  Phone:  [phone]  Fax:  [phone]                                    Karen Buenrostro   MRN: [id number]    Department:   Emergency Department   Date of Visit:  4/29/2019           After Visit Summary Signature Page    I have received my discharge instructions, and my questions have been answered. I have discussed any challenges I see with this plan with the nurse or doctor.    ..........................................................................................................................................  Patient/Patient Representative Signature      ..........................................................................................................................................  Patient Representative Print Name and Relationship to Patient    ..................................................               ................................................  Date                                   Time    ..........................................................................................................................................  Reviewed by Signature/Title    ...................................................              ..............................................  Date                                               Time          22EPIC Rev 08/18

## 2019-04-29 NOTE — DISCHARGE INSTRUCTIONS
Tylenol, brain rest, heat, ice, massage, lidocaine patches at home to help with back and headache discomfort.  Use the Flexeril, muscle relaxant, at nighttime to help with muscle spasming.  Follow-up with the back and neck specialist above if not improving.  Do not work for the next 3 days.  Follow-up with your primary care provider on Friday for recheck.  Return the emergency department for any bowel or bladder incontinence, groin anesthesia, loss of consciousness, confusion, seizure-like activity, new concerns.  Brain rest at home.  Listen to music, podcast, books on tape.  Gentle stretching throughout the day.  Trying get a good nights rest but did not sleep throughout the day.    Discharge Instructions  Head Injury    You have been seen today for a head injury. You were checked for serious problems, like bleeding on the brain, but these problems cannot always be found right away.  Due to this risk, you should not be alone for 24 hours after your injury.  Follow up with your regular physician in 3 days. If you are taking a blood thinner, such as aspirin, Pradaxa  (dabigatran), Coumadin  (warfarin), or Plavix  (clopidogrel), you are at especially high risk for immediate or delayed bleeding, and need to re-check with a physician in 24 hours, or sooner if any of the symptoms below happen.     Return to the Emergency Department if:  You are confused, have amnesia, or you are not acting right.  Your headache gets worse or you start to have a really bad headache even with your recommended treatment plan.  You vomit more than once.  You have a convulsion or seizure.  You have trouble walking.  You have weakness or paralysis in an arm or a leg.  You have blood or fluid coming from your ears or nose.  You have new symptoms or anything that worries you.    Sleeping:  It is okay for you to sleep, but someone should wake you up as instructed by your doctor, and someone should check on you at your usual time to wake up.      Activity:  Do not drive for at least 24 hours.  Do not drive if you have dizzy spells or trouble concentrating, or remembering things.  Do not return to any contact sports until cleared by your regular doctor.     Follow-up:  It is very important that you make an appointment with your clinic and go to the appointment.  If you do not follow-up with your regular doctor, it may result in missing an important development which could result in permanent injury or disability and/or lasting pain.  If there is any problem keeping your appointment, call your doctor or return to the Emergency Department.    MORE INFORMATION:    Concussion:  A concussion is a minor head injury that may cause temporary problems with the way your brain works.  Some symptoms include:  confusion, amnesia, nausea and vomiting, dizziness, fatigue, memory or concentration problems, irritability and sleep problems.    CT Scans: Your evaluation today may have included a CT scan (CAT scan) to look for things like bleeding or a skull fracture (break).  CT scans involve radiation and too many CT scans can cause serious health problems like cancer, especially in children.  Because of this, your doctor may not have ordered a CT scan today if they think you are at low risk for a serious or life threatening problem.    If you were given a prescription for medicine here today, be sure to read all of the information (including the package insert) that comes with your prescription.  This will include important information about the medicine, its side effects, and any warnings that you need to know about.  The pharmacist who fills the prescription can provide more information and answer questions you may have about the medicine.  If you have questions or concerns that the pharmacist cannot address, please call or return to the Emergency Department.       Discharge Instructions  Back Pain  You were seen today for back pain. Back pain can have many causes, but  most will get better without surgery or other specific treatment. Sometimes there is a herniated (?slipped?) disc. We don?t usually do MRI scans to look for these right away, since most herniated discs will get better on their own with time.  Today, we did not find any evidence that your back pain was caused by a serious condition, such as an infection, fracture, or tumor. However, sometimes symptoms develop over time and cannot be found during an emergency visit, so it is very important that you follow up with your primary doctor.  Return to the Emergency Department if:  You develop a fever with your back pain.   You have weakness or change in sensation in one or both legs.  You lose control of your bowels or bladder, or can?t empty your bladder.  Your pain gets much worse.     Follow-up with your doctor:  Unless your pain has completely gone away, please make an appointment with your doctor within one week.  You may need further management of your back pain, such as more pain medication, imaging such as an X-ray or MRI, or physical therapy.    What can I do to help myself?  Remain Active -- People are often afraid that they will hurt their back further or delay recovery by remaining active, but this is one of the best things you can do for your back. In fact, prolonged bed rest is not recommended. Studies have shown that people with low back pain recover faster when they remain active. Movement helps to bring blood flow to the muscles and relieve muscle spasms as well as preventing loss of muscle strength.  Heat -- Using a heating pad can help with low back pain during the first few weeks. Do not sleep with a heating pad, as you can be burned.   Pain medications - You may take a pain medication such as Tylenol  (acetaminophen), Advil , Nuprin  (ibuprofen) or Aleve  (naproxen).  If you have been given a narcotic such as Vicodin  (hydrocodone with acetaminophen), Percocet  (oxycodone with acetaminophen), codeine, or  a muscle relaxant such as Flexeril  (cyclobenzaprine) or Soma  (carisoprodol), do not drive for four hours after you have taken it. If the narcotic contains Tylenol  (acetaminophen), do not take Tylenol  with it. All narcotics will cause constipation, so eat a high fiber diet.   If you were given a prescription for medicine here today, be sure to read all of the information (including the package insert) that comes with your prescription.  This will include important information about the medicine, its side effects, and any warnings that you need to know about.  The pharmacist who fills the prescription can provide more information and answer questions you may have about the medicine.  If you have questions or concerns that the pharmacist cannot address, please call or return to the Emergency Department.   Opioid Medication Information    Pain medications are among the most commonly prescribed medicines, so we are including this information for all our patients. If you did not receive pain medication or get a prescription for pain medicine, you can ignore it.     You may have been given a prescription for an opioid (narcotic) pain medicine and/or have received a pain medicine while here in the Emergency Department. These medicines can make you drowsy or impaired. You must not drive, operate dangerous equipment, or engage in any other dangerous activities while taking these medications. If you drive while taking these medications, you could be arrested for DUI, or driving under the influence. Do not drink any alcohol while you are taking these medications.     Opioid pain medications can cause addiction. If you have a history of chemical dependency of any type, you are at a higher risk of becoming addicted to pain medications.  Only take these prescribed medications to treat your pain when all other options have been tried. Take it for as short a time and as few doses as possible. Store your pain pills in a secure  place, as they are frequently stolen and provide a dangerous opportunity for children or visitors in your house to start abusing these powerful medications. We will not replace any lost or stolen medicine.  As soon as your pain is better, you should flush all your remaining medication.     Many prescription pain medications contain Tylenol  (acetaminophen), including Vicodin , Tylenol #3 , Norco , Lortab , and Percocet .  You should not take any extra pills of Tylenol  if you are using these prescription medications or you can get very sick.  Do not ever take more than 3000 mg of acetaminophen in any 24 hour period.    All opioids tend to cause constipation. Drink plenty of water and eat foods that have a lot of fiber, such as fruits, vegetables, prune juice, apple juice and high fiber cereal.  Take a laxative if you don?t move your bowels at least every other day. Miralax , Milk of Magnesia, Colace , or Senna  can be used to keep you regular.      Remember that you can always come back to the Emergency Department if you are not able to see your regular doctor in the amount of time listed above, if you get any new symptoms, or if there is anything that worries you.

## 2019-04-29 NOTE — LETTER
April 29, 2019      To Whom It May Concern:      Karen Buenrostro was seen in our Emergency Department today, 04/29/19.  She will not be able to work for the next 3 days.  She will not be able to work until cleared by her primary care provider on Friday.    Sincerely,        JAD Martin

## 2019-05-03 ENCOUNTER — HOSPITAL ENCOUNTER (OUTPATIENT)
Dept: CT IMAGING | Facility: CLINIC | Age: 29
Discharge: HOME | End: 2019-05-03
Attending: FAMILY MEDICINE | Admitting: FAMILY MEDICINE

## 2019-05-03 ENCOUNTER — OFFICE VISIT (OUTPATIENT)
Dept: INTERNAL MEDICINE | Facility: CLINIC | Age: 29
End: 2019-05-03

## 2019-05-03 VITALS
DIASTOLIC BLOOD PRESSURE: 82 MMHG | SYSTOLIC BLOOD PRESSURE: 125 MMHG | RESPIRATION RATE: 18 BRPM | WEIGHT: 293 LBS | HEART RATE: 99 BPM | OXYGEN SATURATION: 98 % | BODY MASS INDEX: 44.41 KG/M2 | HEIGHT: 68 IN | TEMPERATURE: 97.4 F

## 2019-05-03 DIAGNOSIS — S00.83XD CONTUSION OF OTHER PART OF HEAD, SUBSEQUENT ENCOUNTER: ICD-10-CM

## 2019-05-03 DIAGNOSIS — S06.0X0D CONCUSSION WITHOUT LOSS OF CONSCIOUSNESS, SUBSEQUENT ENCOUNTER: ICD-10-CM

## 2019-05-03 DIAGNOSIS — S06.0X0D CONCUSSION WITHOUT LOSS OF CONSCIOUSNESS, SUBSEQUENT ENCOUNTER: Primary | ICD-10-CM

## 2019-05-03 PROCEDURE — 99213 OFFICE O/P EST LOW 20 MIN: CPT | Performed by: FAMILY MEDICINE

## 2019-05-03 PROCEDURE — 70450 CT HEAD/BRAIN W/O DYE: CPT

## 2019-05-03 ASSESSMENT — MIFFLIN-ST. JEOR: SCORE: 2141.56

## 2019-05-03 NOTE — LETTER
[place] [address]  [facility] [address]  Phone: [phone]      REPORT OF WORK ABILITY    NOTE TO EMPLOYEE: You must promptly provide a copy of this report to your  employer or worker's compensation insurer, and Qualified Rehabilitation Consultant.    Date: 5/3/2019                     Employee Name: Karen Buenrostro         YOB: 1990  Medical Record Number: [id number]   Soc.Sec.No: [id number]  Employer:   [employer]               Date of Injury: 4-  Managed Care Organization / Insurance Company Name: UNKNOWN    Diagnosis:     (S06.0X0D) Concussion without loss of consciousness, subsequent encounter  (primary encounter diagnosis)  Comment:   Plan: CT Head w/o Contrast            (S00.83XD) Contusion of other part of head, subsequent encounter  Comment:   Plan: CT Head w/o Contrast              Work Related: yes     MMI: NO   Permanent Partial Disability(PPD) likely: NO    EMPLOYEE IS ABLE TO WORK: OFF work from 4-29 to 5-7-2019.   Recheck 5-7.     RESTRICTIONS IF ANY:      OTHER RESTRICTIONS: None    TREATMENT PLAN/NOTES: mental and physical rest..      Prem Faustin MD on 5/3/2019 at 9:30 AM
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
## 2019-05-03 NOTE — NURSING NOTE
Head CT scheduled for 1:00 PM today at the [facility]. Patient to check in at 12:45 PM. They were instructed to hold the patient there and call Dr. Faustin with the results. Patient notified of appointment.

## 2019-05-07 ENCOUNTER — OFFICE VISIT (OUTPATIENT)
Dept: INTERNAL MEDICINE | Facility: CLINIC | Age: 29
End: 2019-05-07
Payer: COMMERCIAL

## 2019-05-07 VITALS
DIASTOLIC BLOOD PRESSURE: 83 MMHG | SYSTOLIC BLOOD PRESSURE: 137 MMHG | HEIGHT: 68 IN | OXYGEN SATURATION: 97 % | BODY MASS INDEX: 44.41 KG/M2 | HEART RATE: 84 BPM | RESPIRATION RATE: 16 BRPM | TEMPERATURE: 98.4 F | WEIGHT: 293 LBS

## 2019-05-07 DIAGNOSIS — S06.0X0D CONCUSSION WITHOUT LOSS OF CONSCIOUSNESS, SUBSEQUENT ENCOUNTER: Primary | ICD-10-CM

## 2019-05-07 DIAGNOSIS — S00.83XD CONTUSION OF OTHER PART OF HEAD, SUBSEQUENT ENCOUNTER: ICD-10-CM

## 2019-05-07 PROCEDURE — 99213 OFFICE O/P EST LOW 20 MIN: CPT | Performed by: FAMILY MEDICINE

## 2019-05-07 ASSESSMENT — MIFFLIN-ST. JEOR: SCORE: 2137.93

## 2019-05-07 NOTE — LETTER
[place] [address]  [facility] [address]  Phone: [phone]      REPORT OF WORK ABILITY    NOTE TO EMPLOYEE: You must promptly provide a copy of this report to your  employer or worker's compensation insurer, and Qualified Rehabilitation Consultant.    Date: 5/7/2019                     Employee Name: Karen Buenrostro         YOB: 1990  Medical Record Number: [id number]   Soc.Sec.No: [id number]  Employer:  [employer]                Date of Injury: 4-  Managed Care Organization / Insurance Company Name: UNKNOWN    Diagnosis:     (S06.0X0D) Concussion without loss of consciousness, subsequent encounter  (primary encounter diagnosis)  Comment:   Plan:     (S00.83XD) Contusion of other part of head, subsequent encounter  Comment:   Plan:       Work Related: yes     MMI: NO   Permanent Partial Disability(PPD) likely: NO    EMPLOYEE IS ABLE TO WORK: OFF Work until May 15 (recheck).     RESTRICTIONS IF ANY:    OTHER RESTRICTIONS: None    TREATMENT PLAN/NOTES:  Mental and physical rest.      Prem Faustin MD on 5/7/2019 at 9:40 AM

## 2019-05-08 NOTE — PROGRESS NOTES
"  CHIEF COMPLAINT    Follow-up concussion.      HISTORY    Please see previous note for details.  She is 8 days post fall with contusion of head and concussion.    Patient still is describing headache, especially after activity such as walking.  She also has mental fogginess.  Some light sensitivity.    She does have a past history of some concussions also.    She is not having nausea or vomiting.  No incoordination.  No weakness or numbness.      Patient Active Problem List   Diagnosis     History of diverticulitis     Morbid obesity (H)     Mild persistent asthma without complication       Current Outpatient Medications   Medication Sig Dispense Refill     albuterol (PROAIR HFA/PROVENTIL HFA/VENTOLIN HFA) 108 (90 Base) MCG/ACT inhaler Inhale 1-2 puffs into the lungs every 4 hours as needed for shortness of breath / dyspnea or wheezing 1 Inhaler 1     AMETHIA 0.15-0.03 &0.01 MG tablet Take 1 tablet by mouth daily 91 tablet 3     fluticasone (ARNUITY ELLIPTA) 100 MCG/ACT inhaler Inhale 1 puff into the lungs daily 90 each 1     ranitidine (ZANTAC) 300 MG tablet TAKE ONE TABLET BY MOUTH AT BEDTIME 90 tablet 3       REVIEW OF SYSTEMS    No visual problems.  No fever.  No S OB.  No cough.  No chest pain.  No nausea or abdominal pain.  No rashes.      Past Medical History:   Diagnosis Date     Diverticulitis      Duodenitis determined by biopsy 2013    thought due to excedrin use     Fatty liver disease, nonalcoholic      Post concussion syndrome 2011       EXAM  /83 (BP Location: Right arm, Patient Position: Sitting, Cuff Size: Adult Large)   Pulse 84   Temp 98.4  F (36.9  C) (Oral)   Resp 16   Ht 1.727 m (5' 8\")   Wt 135.9 kg (299 lb 11.2 oz)   SpO2 97%   BMI 45.57 kg/m      Alert.  Speech clear.  HEENT.  PE RRL, EOMI, face symmetrical.  No palpable head tenderness or swelling.  Neck.  Full range of motion.  Nontender.  Chest.  Nonlabored breathing.  Neuro.  Motor, fine motor, gait normal.      (S06.0X0D) " Concussion without loss of consciousness, subsequent encounter  (primary encounter diagnosis)  Comment:   Still quite symptomatic.  Unable to work at this time.  Workability completed.  Plan: Recheck planned on May 15.    (S00.83XD) Contusion of other part of head, subsequent encounter  Comment:   Plan:

## 2019-05-15 ENCOUNTER — OFFICE VISIT (OUTPATIENT)
Dept: INTERNAL MEDICINE | Facility: CLINIC | Age: 29
End: 2019-05-15
Payer: COMMERCIAL

## 2019-05-15 VITALS
RESPIRATION RATE: 16 BRPM | WEIGHT: 293 LBS | DIASTOLIC BLOOD PRESSURE: 83 MMHG | HEART RATE: 90 BPM | SYSTOLIC BLOOD PRESSURE: 129 MMHG | TEMPERATURE: 98 F | BODY MASS INDEX: 44.41 KG/M2 | OXYGEN SATURATION: 97 % | HEIGHT: 68 IN

## 2019-05-15 DIAGNOSIS — S06.0X0D CONCUSSION WITHOUT LOSS OF CONSCIOUSNESS, SUBSEQUENT ENCOUNTER: Primary | ICD-10-CM

## 2019-05-15 PROCEDURE — 99213 OFFICE O/P EST LOW 20 MIN: CPT | Performed by: FAMILY MEDICINE

## 2019-05-15 ASSESSMENT — MIFFLIN-ST. JEOR: SCORE: 2142.47

## 2019-05-15 NOTE — PROGRESS NOTES
"  CHIEF COMPLAINT    Follow-up of concussion April 29.      HISTORY    Please see the preceding notes.  She fell in a freezer, hitting the back of her head.    Roz has been a bit more active.  She got a headache and vomiting spell after moving some boxes.  She has a mild headache.  Sometimes develops dizziness with activity.  In general no severe headaches.  Not noticing mental fogginess particularly.      Patient Active Problem List   Diagnosis     History of diverticulitis     Morbid obesity (H)     Mild persistent asthma without complication       Current Outpatient Medications   Medication Sig Dispense Refill     albuterol (PROAIR HFA/PROVENTIL HFA/VENTOLIN HFA) 108 (90 Base) MCG/ACT inhaler Inhale 1-2 puffs into the lungs every 4 hours as needed for shortness of breath / dyspnea or wheezing 1 Inhaler 1     AMETHIA 0.15-0.03 &0.01 MG tablet Take 1 tablet by mouth daily 91 tablet 3     fluticasone (ARNUITY ELLIPTA) 100 MCG/ACT inhaler Inhale 1 puff into the lungs daily 90 each 1     ranitidine (ZANTAC) 300 MG tablet TAKE ONE TABLET BY MOUTH AT BEDTIME 90 tablet 3       REVIEW OF SYSTEMS    No fevers.  No breathing problems.  No chest pains.  No abdominal pains.  No numbness or weakness.      Past Medical History:   Diagnosis Date     Diverticulitis      Duodenitis determined by biopsy 2013    thought due to excedrin use     Fatty liver disease, nonalcoholic      Post concussion syndrome 2011       EXAM  /83 (BP Location: Right arm, Patient Position: Sitting, Cuff Size: Adult Large)   Pulse 90   Temp 98  F (36.7  C) (Oral)   Resp 16   Ht 1.727 m (5' 8\")   Wt 136.4 kg (300 lb 11.2 oz)   SpO2 97%   BMI 45.72 kg/m      In general she appears to be moving about more vigorously today.  She moves her neck without significant discomfort.  HEENT.  Pupils equal EOMs normal speech clear  Neck.  Full range of motion without tenderness.  Neuro.  Finger-nose-finger with slight hesitation.  Gait " normal.      (S06.0X0D) Concussion without loss of consciousness, subsequent encounter  (primary encounter diagnosis)  Comment:   Seems to be improving.  Still some symptoms after modest activity.  Plan:   Plan to check her again May 28.  Consider part-time return to work such as 4 hours/day.  Workability and a disability form were completed.

## 2019-05-15 NOTE — LETTER
[place] [address]  [facility] [address]  Phone: [phone]      REPORT OF WORK ABILITY    NOTE TO EMPLOYEE: You must promptly provide a copy of this report to your  employer or worker's compensation insurer, and Qualified Rehabilitation Consultant.    Date: 5/15/2019                     Employee Name: Karen Buenrostro         YOB: 1990  Medical Record Number: [id number]   Soc.Sec.No: [id number]  Employer:  [employer]               Date of Injury: 4-29-19  Managed Care Organization / Insurance Company Name: UNKNOWN    Diagnosis:     (S06.0X0D) Concussion without loss of consciousness, subsequent encounter  (primary encounter diagnosis)  Comment:   Plan:       Work Related: yes     MMI: NO   Permanent Partial Disability(PPD) likely: NO    EMPLOYEE IS ABLE TO WORK: OFF Work until 5-.     RESTRICTIONS IF ANY:    OTHER RESTRICTIONS: None    TREATMENT PLAN/NOTES: mental and physical rest.      Prem Faustin MD on 5/15/2019 at 8:51 AM

## 2019-05-29 ENCOUNTER — OFFICE VISIT (OUTPATIENT)
Dept: INTERNAL MEDICINE | Facility: CLINIC | Age: 29
End: 2019-05-29
Payer: COMMERCIAL

## 2019-05-29 VITALS
SYSTOLIC BLOOD PRESSURE: 128 MMHG | RESPIRATION RATE: 16 BRPM | HEART RATE: 109 BPM | BODY MASS INDEX: 44.41 KG/M2 | DIASTOLIC BLOOD PRESSURE: 78 MMHG | WEIGHT: 293 LBS | TEMPERATURE: 98.5 F | OXYGEN SATURATION: 99 % | HEIGHT: 68 IN

## 2019-05-29 DIAGNOSIS — S06.0X0D CONCUSSION WITHOUT LOSS OF CONSCIOUSNESS, SUBSEQUENT ENCOUNTER: Primary | ICD-10-CM

## 2019-05-29 PROCEDURE — 99213 OFFICE O/P EST LOW 20 MIN: CPT | Performed by: FAMILY MEDICINE

## 2019-05-29 ASSESSMENT — MIFFLIN-ST. JEOR: SCORE: 2163.79

## 2019-05-29 NOTE — LETTER
[place] [address]  [facility] [address]  Phone: [phone]      REPORT OF WORK ABILITY    NOTE TO EMPLOYEE: You must promptly provide a copy of this report to your  employer or worker's compensation insurer, and Qualified Rehabilitation Consultant.    Date: 5/29/2019                     Employee Name: Karen Buenrostro         YOB: 1990  Medical Record Number: [id number]   Soc.Sec.No: [id number]  Employer:  [employer]               Date of Injury: 4-  Managed Care Organization / Insurance Company Name: UNKNOWN    Diagnosis:   (S06.0X0D) Concussion without loss of consciousness, subsequent encounter  (primary encounter diagnosis)    Work Related: yes     MMI: NO   Permanent Partial Disability(PPD) likely: NO    EMPLOYEE IS ABLE TO WORK:     As of May 31, work Mon, Wed, Fri 4 hours per days.  No lift / push / pull over 10 #.  No ladders.    Re check June 11.     RESTRICTIONS IF ANY:    See above.    OTHER RESTRICTIONS: None    TREATMENT PLAN/NOTES:     Mental and physical rest. Gradual RTW.      Prem Faustin MD on 5/29/2019 at 9:47 AM

## 2019-05-30 NOTE — PROGRESS NOTES
"  CHIEF COMPLAINT    Follow-up concussion after fall backwards onto cement floor.  Please see the preceding notes for details.  Date of injury was April 29.    In general the patient seems to be doing better.  She does have a dull headache no worse than level 4 out of 10.  She had some dizziness after being out at a convention for at least 3 hours.  She is not having nausea or vomiting.  No incoordination.    On the whole she feels improved and it sounds that way from her updated history.    She is seen with a QR C today. Nenita Rodriguez RN.      Patient Active Problem List   Diagnosis     History of diverticulitis     Morbid obesity (H)     Mild persistent asthma without complication       REVIEW OF SYSTEMS    No neck pain.  No S OB or chest pain.  No back pain.  No abdominal pain.  No incoordination, numbness, weakness.      SOCIAL HISTORY    Patient works at [employer] as a .    Past Medical History:   Diagnosis Date     Diverticulitis      Duodenitis determined by biopsy 2013    thought due to excedrin use     Fatty liver disease, nonalcoholic      Post concussion syndrome 2011       EXAM  /78 (BP Location: Left arm, Patient Position: Sitting, Cuff Size: Adult Large)   Pulse 109   Temp 98.5  F (36.9  C) (Oral)   Resp 16   Ht 1.727 m (5' 8\")   Wt 138.5 kg (305 lb 6.4 oz)   SpO2 99%   BMI 46.44 kg/m      Patient is moving about comfortably and moving her head and neck comfortably today.  HEENT unremarkable.  Neck supple nontender.  Motor and gait normal.      (S06.0X0D) Concussion without loss of consciousness, subsequent encounter  (primary encounter diagnosis)  Comment:   Noticeable improvement.  I believe that she is ready to return to part-time work with some restrictions at this point.    Plan:   After discussion we will plan on her working Monday, Wednesday, Fridays 4 hours/day to begin May 31.  Follow-up check June 11.  Workability completed.      "

## 2019-06-11 ENCOUNTER — OFFICE VISIT (OUTPATIENT)
Dept: INTERNAL MEDICINE | Facility: CLINIC | Age: 29
End: 2019-06-11

## 2019-06-11 VITALS
HEART RATE: 82 BPM | TEMPERATURE: 98.4 F | RESPIRATION RATE: 18 BRPM | DIASTOLIC BLOOD PRESSURE: 81 MMHG | HEIGHT: 68 IN | OXYGEN SATURATION: 97 % | WEIGHT: 293 LBS | BODY MASS INDEX: 44.41 KG/M2 | SYSTOLIC BLOOD PRESSURE: 127 MMHG

## 2019-06-11 DIAGNOSIS — S06.0X0D CONCUSSION WITHOUT LOSS OF CONSCIOUSNESS, SUBSEQUENT ENCOUNTER: Primary | ICD-10-CM

## 2019-06-11 PROCEDURE — 99213 OFFICE O/P EST LOW 20 MIN: CPT | Performed by: FAMILY MEDICINE

## 2019-06-11 ASSESSMENT — MIFFLIN-ST. JEOR: SCORE: 2155.17

## 2019-06-11 NOTE — LETTER
[place] [address]  [facility] [address]  Phone: [phone]      REPORT OF WORK ABILITY    NOTE TO EMPLOYEE: You must promptly provide a copy of this report to your  employer or worker's compensation insurer, and Qualified Rehabilitation Consultant.    Date: 6/11/2019                     Employee Name: Karen Buenrostro         YOB: 1990  Medical Record Number: [id number]   Soc.Sec.No: [id number]  Employer:    [employer]             Date of Injury: 4-29-19  Managed Care Organization / Insurance Company Name: UNKNOWN    Diagnosis:     (S06.0X0D) Concussion without loss of consciousness, subsequent encounter  (primary encounter diagnosis)      Work Related: yes     MMI: NO   Permanent Partial Disability(PPD) likely: NO    EMPLOYEE IS ABLE TO WORK: with restrictions from 6-11 to 6-     RESTRICTIONS IF ANY:    Work Mon, Wed, Friday 6 hours per day.    No lift / push / pull over 10#.   No ladders.    OTHER RESTRICTIONS: None    TREATMENT PLAN/NOTES:   Mental and physical rest, Tylenol, gradually increase work..      Prem Faustin MD on 6/11/2019 at 9:58 AM

## 2019-06-12 ASSESSMENT — ASTHMA QUESTIONNAIRES: ACT_TOTALSCORE: 16

## 2019-06-12 NOTE — PROGRESS NOTES
"  CHIEF COMPLAINT    Follow-up concussion injury.      HISTORY    Patient fell, striking her head on cement floor.  She fell from a ladder.  Please see the previous visits for details.  Day of injury was April 29, 2019.    Patient has gone back to work Monday, Wednesday, Friday for 4 hours.  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  It sounds like her work got crammed into 4 hours and might be a bit overwhelming.  She was getting some headaches and a little dizziness after 4 hours.  She does still want to continue trying to get back to work.    She was seen again today with her rehab specialist.  Her name is in the previous dictation.    Postconcussion symptoms at this time seem to be headache, occasional dizziness, tiredness.      REVIEW OF SYSTEMS    Unremarkable other than above.      EXAM  /81 (BP Location: Right arm, Patient Position: Sitting, Cuff Size: Adult Large)   Pulse 82   Temp 98.4  F (36.9  C) (Oral)   Resp 18   Ht 1.727 m (5' 8\")   Wt 137.7 kg (303 lb 8 oz)   LMP 06/09/2019 (Exact Date)   SpO2 97%   BMI 46.15 kg/m      Alert, probably looks slightly better than on some of her previous visits.  HEENT.  PE RRL.  EOMI.  Neck nontender.  Gait normal.  Heel-to-toe walk performed near normally.      (S06.0X0D) Concussion without loss of consciousness, subsequent encounter  (primary encounter diagnosis)  Comment:   After some discussion, may be best to have her at work 6 hours so that she can spread her tasks out a little more.  Same restrictions.  See workability.    Plan: Follow-up in 2 weeks.      "

## 2019-06-24 ENCOUNTER — OFFICE VISIT (OUTPATIENT)
Dept: INTERNAL MEDICINE | Facility: CLINIC | Age: 29
End: 2019-06-24

## 2019-06-24 VITALS
WEIGHT: 293 LBS | HEART RATE: 103 BPM | DIASTOLIC BLOOD PRESSURE: 84 MMHG | HEIGHT: 68 IN | OXYGEN SATURATION: 99 % | RESPIRATION RATE: 17 BRPM | TEMPERATURE: 97.8 F | BODY MASS INDEX: 44.41 KG/M2 | SYSTOLIC BLOOD PRESSURE: 132 MMHG

## 2019-06-24 DIAGNOSIS — S06.0X0D CONCUSSION WITHOUT LOSS OF CONSCIOUSNESS, SUBSEQUENT ENCOUNTER: Primary | ICD-10-CM

## 2019-06-24 PROCEDURE — 99213 OFFICE O/P EST LOW 20 MIN: CPT | Performed by: FAMILY MEDICINE

## 2019-06-24 RX ORDER — HYDROXYZINE PAMOATE 50 MG/1
CAPSULE ORAL
Qty: 40 CAPSULE | Refills: 2 | Status: SHIPPED | OUTPATIENT
Start: 2019-06-24 | End: 2019-07-08

## 2019-06-24 ASSESSMENT — MIFFLIN-ST. JEOR: SCORE: 2152.9

## 2019-06-24 NOTE — PROGRESS NOTES
"  CHIEF COMPLAINT    F/U closed head injury      HISTORY    We attempted a schedule of 6 hour per day, Mon, Wed, Fri.  He states that this is fairly stressful because a week's worth of work its crammed into three 6-hour shifts.  She was working previously as a manager.  And is now about 8 weeks post injury.  Date of injury was April 29.     She gets headaches each work day. Some dizziness. Has problems getting to sleep.    Patient's QR C is present today.      Patient Active Problem List   Diagnosis     History of diverticulitis     Morbid obesity (H)     Mild persistent asthma without complication       REVIEW OF SYSTEMS    No visual disturbances.  No breathing problems.  No chest pains.  No nausea or vomiting.  No weakness or numbness.  No incoordination.      Past Medical History:   Diagnosis Date     Diverticulitis      Duodenitis determined by biopsy 2013    thought due to excedrin use     Fatty liver disease, nonalcoholic      Post concussion syndrome 2011       EXAM  /84   Pulse 103   Temp 97.8  F (36.6  C) (Oral)   Resp 17   Ht 1.727 m (5' 8\")   Wt 137.4 kg (303 lb)   LMP 06/09/2019 (Exact Date)   SpO2 99%   BMI 46.07 kg/m      Alert.  HEENT unremarkable.  Pupils equal.  Neck nontender.  Motor and gait normal.  Speech clear.      (S06.0X0D) Concussion without loss of consciousness, subsequent encounter  (primary encounter diagnosis)  Comment:     After discussion, we will cautiously try to increase her hours at work.  Plan 6 hours Monday Wednesday Friday and 4 hours Tuesday Thursday.  Recheck is planned in 2 weeks.  I have also offered some hydroxyzine to use for help with sleep.  Patient voices understanding of these plans and is willing to try the arrangement as described.    Plan: hydrOXYzine (VISTARIL) 50 MG capsule    Workability was completed.                          "

## 2019-06-24 NOTE — NURSING NOTE
"/84   Pulse 103   Temp 97.8  F (36.6  C) (Oral)   Resp 17   Ht 1.727 m (5' 8\")   Wt 137.4 kg (303 lb)   LMP 06/09/2019 (Exact Date)   SpO2 99%   BMI 46.07 kg/m    Patient in for work injury follow up.  Joana Ness CMA    "

## 2019-06-24 NOTE — LETTER
[place] [address]  [facility] [address]  Phone: [phone]      REPORT OF WORK ABILITY    NOTE TO EMPLOYEE: You must promptly provide a copy of this report to your  employer or worker's compensation insurer, and Qualified Rehabilitation Consultant.    Date: 6/24/2019                     Employee Name: Karen Buenrostro         YOB: 1990  Medical Record Number: [id number]   Soc.Sec.No: [id number]  Employer: None                Date of Injury: 4-29-19  Managed Care Organization / Insurance Company Name: UNKNOWN    Diagnosis:   (S06.0X0D) Concussion without loss of consciousness, subsequent encounter  (primary encounter diagnosis)  Comment:   Plan: hydrOXYzine (VISTARIL) 50 MG capsule          Work Related: yes     MMI: NO   Permanent Partial Disability(PPD) likely: NO    EMPLOYEE IS ABLE TO WORK: Return to work with restrictions on 6-     RESTRICTIONS IF ANY:    Limit Hours:  Mon, Wed, Fri 6 hours per day  Tues, Thurs 4 hours per day.    Follow up July 8, 2019    OTHER RESTRICTIONS: none    TREATMENT PLAN/NOTES:     Mental, Physical rest.  Reduced work load.  Med for sleep.      Prem Faustin MD on 6/24/2019 at 9:58 AM

## 2019-06-26 ENCOUNTER — NURSE TRIAGE (OUTPATIENT)
Dept: INTERNAL MEDICINE | Facility: CLINIC | Age: 29
End: 2019-06-26

## 2019-06-26 ENCOUNTER — OFFICE VISIT (OUTPATIENT)
Dept: URGENT CARE | Facility: URGENT CARE | Age: 29
End: 2019-06-26

## 2019-06-26 ENCOUNTER — NURSE TRIAGE (OUTPATIENT)
Dept: PEDIATRICS | Facility: CLINIC | Age: 29
End: 2019-06-26

## 2019-06-26 VITALS
DIASTOLIC BLOOD PRESSURE: 90 MMHG | OXYGEN SATURATION: 98 % | BODY MASS INDEX: 46.07 KG/M2 | HEART RATE: 93 BPM | WEIGHT: 293 LBS | SYSTOLIC BLOOD PRESSURE: 138 MMHG | TEMPERATURE: 97.8 F | RESPIRATION RATE: 18 BRPM

## 2019-06-26 DIAGNOSIS — R21 RASH: Primary | ICD-10-CM

## 2019-06-26 PROCEDURE — 99213 OFFICE O/P EST LOW 20 MIN: CPT | Performed by: FAMILY MEDICINE

## 2019-06-26 RX ORDER — METHYLPREDNISOLONE 4 MG
TABLET, DOSE PACK ORAL
Qty: 21 TABLET | Refills: 0 | Status: SHIPPED | OUTPATIENT
Start: 2019-06-26 | End: 2019-07-08

## 2019-06-26 NOTE — PROGRESS NOTES
SUBJECTIVE:Karen Buenrostro is a 28 year old female who presents to the clinic today for a rash.  Onset of rash was 1 day(s) ago.   Rash is still present.   Location of the rash: foot and hand.  Associated symptoms include: itching and burning.    Symptoms are mild and moderate and rash seems to be worsening.  Therapies tried to improve the rash: atarax last night.  Previous history of a similar rash? No  Recent exposure history: none known    Past Medical History:   Diagnosis Date     Diverticulitis      Duodenitis determined by biopsy 2013    thought due to excedrin use     Fatty liver disease, nonalcoholic      Post concussion syndrome 2011     Allergies   Allergen Reactions     Cinnamon Hives, Itching and Swelling     Social History     Tobacco Use     Smoking status: Never Smoker     Smokeless tobacco: Never Used   Substance Use Topics     Alcohol use: Yes     Comment: occ       ROS:CONSTITUTIONAL:NEGATIVE for fever, chills, change in weight    EXAM: VITALS: /90 (BP Location: Left arm, Patient Position: Sitting, Cuff Size: Adult Regular)   Pulse 93   Temp 97.8  F (36.6  C) (Oral)   Resp 18   Wt 137.4 kg (303 lb)   LMP 06/09/2019 (Exact Date)   SpO2 98%   BMI 46.07 kg/m    General:healthy,alert,no distress    Location: foot and hand     Distribution: diffuse/patchy     Lesion grouping: bilateral     Lesion type: macular and wheals     Color: skin color with no other findingsPERTINENT EXAM: GENERAL APPEARANCE: healthy, alert and no distress      ICD-10-CM    1. Rash R21 methylPREDNISolone (MEDROL DOSEPAK) 4 MG tablet therapy pack     DERMATOLOGY REFERRAL     OTC zyrtec  Follow-up with primary clinic if not improving

## 2019-06-26 NOTE — TELEPHONE ENCOUNTER
"Patient states she thinks she may be having an allergic reaction to Hydroxyzine. Patient states the medications was prescribed to help her sleep, and she took it for the first time last night. Patient woke up this morning with red bumps on her hands and wrists that burn/sting when they are touched. Denies facial or tongue swelling. Patient denies trouble breathing, but states she \"has to take deep breaths\". Writer feels as if patient sounds congested, patient is coughs during conversation. No fever, states other than the rash she feels \"normal\". Patient states she does not have have a steroid cream or Benadryl at home.    Advised patient to be seen as soon as possible at an urgent care given there are no appointments in this clinic. Care advice given per protocol. Advised patient to go to the ER if difficulty breathing, facial or tongue swelling occurs. Patient verbalized understanding.    Additional Information    Negative: Sounds like a life-threatening emergency to the triager    Negative: Possible contact with poison ivy or oak    Negative: Insect bite(s) suspected    Negative: Athlete's Foot suspected (i.e., itchy rash between the toes)    Negative: Jock Itch suspected (i.e., itchy rash on inner thighs near genital area)    Negative: Wound infection suspected (i.e., pain, spreading redness, or pus; in a cut, puncture, scrape or sutured wound)    Negative: Rash of external female genital area (vulva)    Negative: Rash of penis or scrotum    Negative: Small spot, skin growth, or mole    Negative: Fever and localized purple or blood-colored spots or dots that are not from injury or friction    Negative: Fever and localized rash is very painful    Negative: Patient sounds very sick or weak to the triager    Negative: Looks like a boil, infected sore, deep ulcer, or other infected rash (spreading redness, pus)    Negative: Painful rash with multiple small blisters grouped together (i.e., dermatomal distribution or " "'band' or 'stripe')    Localized rash is very painful (no fever)    Negative: Difficulty breathing or wheezing    Negative: Hoarseness or cough that started soon after 1st dose of drug    Negative: Swollen tongue that started soon after first dose of drug    Negative: Fever and purple or blood-colored spots or dots    Negative: Too weak or sick to stand    Negative: Sounds like a life-threatening emergency to the triager    Rash is only on 1 part of the body (localized)    Answer Assessment - Initial Assessment Questions  1. APPEARANCE of RASH: \"Describe the rash.\"       Red  2. LOCATION: \"Where is the rash located?\"       Hands and wrists  3. NUMBER: \"How many spots are there?\"       Multiple  4. SIZE: \"How big are the spots?\" (Inches, centimeters or compare to size of a coin)       Unsure  5. ONSET: \"When did the rash start?\"       Noticed it this morning  6. ITCHING: \"Does the rash itch?\" If so, ask: \"How bad is the itch?\"  (Scale 1-10; or mild, moderate, severe)      Denies itching  7. PAIN: \"Does the rash hurt?\" If so, ask: \"How bad is the pain?\"  (Scale 1-10; or mild, moderate, severe)      Yes, moderate  8. OTHER SYMPTOMS: \"Do you have any other symptoms?\" (e.g., fever)      No  9. PREGNANCY: \"Is there any chance you are pregnant?\" \"When was your last menstrual period?\"      No    Protocols used: RASH OR REDNESS - [id number], RASH - WIDESPREAD ON DRUGS - DRUG [id number]      "

## 2019-07-08 ENCOUNTER — OFFICE VISIT (OUTPATIENT)
Dept: INTERNAL MEDICINE | Facility: CLINIC | Age: 29
End: 2019-07-08

## 2019-07-08 VITALS
HEART RATE: 80 BPM | OXYGEN SATURATION: 98 % | RESPIRATION RATE: 18 BRPM | DIASTOLIC BLOOD PRESSURE: 70 MMHG | HEIGHT: 68 IN | BODY MASS INDEX: 44.41 KG/M2 | SYSTOLIC BLOOD PRESSURE: 130 MMHG | TEMPERATURE: 98.3 F | WEIGHT: 293 LBS

## 2019-07-08 DIAGNOSIS — S06.0X0D CONCUSSION WITHOUT LOSS OF CONSCIOUSNESS, SUBSEQUENT ENCOUNTER: Primary | ICD-10-CM

## 2019-07-08 PROCEDURE — 99213 OFFICE O/P EST LOW 20 MIN: CPT | Performed by: FAMILY MEDICINE

## 2019-07-08 ASSESSMENT — MIFFLIN-ST. JEOR: SCORE: 2156.53

## 2019-07-08 NOTE — LETTER
[place] [address]  [facility] [address]  Phone: [phone]      REPORT OF WORK ABILITY    NOTE TO EMPLOYEE: You must promptly provide a copy of this report to your  employer or worker's compensation insurer, and Qualified Rehabilitation Consultant.    Date: 7/8/2019                     Employee Name: Karen Buenrostro         YOB: 1990  Medical Record Number: [id number]   Soc.Sec.No: [id number]  Employer:  [employer]                Date of Injury: 4-  Managed Care Organization / Insurance Company Name: UNKNOWN    Diagnosis:   (S06.0X0D) Concussion without loss of consciousness, subsequent encounter  (primary encounter diagnosis)  Comment:  improved  Plan:       Work Related: yes     MMI:  As of Aug 1, 2019     Permanent Partial Disability(PPD) likely: NO    EMPLOYEE IS ABLE TO WORK: unrestricted as of July-8-2019.     RESTRICTIONS IF ANY:    None.    OTHER RESTRICTIONS: None    TREATMENT PLAN/NOTES: Tylenol      Prem Faustin MD on 7/8/2019 at 9:47 AM

## 2019-07-08 NOTE — PROGRESS NOTES
"  CHIEF COMPLAINT    F/U concussion      HISTORY    Injured 4-. See previous.    Now minimal HA responding to Tylenol. Able to work w/o HA. Improved.    Last 2 weeks worked: M,W,F 6 hr and T, Th 4 hr.    Feels she's ready to resume regular work w/o restrictions.      Patient Active Problem List   Diagnosis     History of diverticulitis     Morbid obesity (H)     Mild persistent asthma without complication       Current Outpatient Medications   Medication Sig Dispense Refill     albuterol (PROAIR HFA/PROVENTIL HFA/VENTOLIN HFA) 108 (90 Base) MCG/ACT inhaler Inhale 1-2 puffs into the lungs every 4 hours as needed for shortness of breath / dyspnea or wheezing 1 Inhaler 1     AMETHIA 0.15-0.03 &0.01 MG tablet Take 1 tablet by mouth daily 91 tablet 3     fluticasone (ARNUITY ELLIPTA) 100 MCG/ACT inhaler Inhale 1 puff into the lungs daily 90 each 1       REVIEW OF SYSTEMS    Unremarkable except as above.      EXAM  /70 (BP Location: Right arm, Patient Position: Sitting, Cuff Size: Adult Large)   Pulse 80   Temp 98.3  F (36.8  C) (Oral)   Resp 18   Ht 1.727 m (5' 8\")   Wt 137.8 kg (303 lb 12.8 oz)   LMP 06/09/2019 (Exact Date)   SpO2 98%   BMI 46.19 kg/m      Speech, motor, gait all nl      (S06.0X0D) Concussion without loss of consciousness, subsequent encounter  (primary encounter diagnosis)  Comment:   Improved.  OK to work w/o restrictions.  Plan:   Workability completed.      "

## 2019-07-31 ENCOUNTER — OFFICE VISIT (OUTPATIENT)
Dept: FAMILY MEDICINE | Facility: CLINIC | Age: 29
End: 2019-07-31
Payer: COMMERCIAL

## 2019-07-31 VITALS
HEIGHT: 68 IN | DIASTOLIC BLOOD PRESSURE: 72 MMHG | TEMPERATURE: 98.4 F | SYSTOLIC BLOOD PRESSURE: 109 MMHG | HEART RATE: 84 BPM | RESPIRATION RATE: 16 BRPM | OXYGEN SATURATION: 97 % | BODY MASS INDEX: 44.41 KG/M2 | WEIGHT: 293 LBS

## 2019-07-31 DIAGNOSIS — J45.30 MILD PERSISTENT ASTHMA WITHOUT COMPLICATION: ICD-10-CM

## 2019-07-31 DIAGNOSIS — Z30.09 FAMILY PLANNING: Primary | ICD-10-CM

## 2019-07-31 PROCEDURE — 99214 OFFICE O/P EST MOD 30 MIN: CPT | Performed by: FAMILY MEDICINE

## 2019-07-31 RX ORDER — ALBUTEROL SULFATE 90 UG/1
1-2 AEROSOL, METERED RESPIRATORY (INHALATION) EVERY 4 HOURS PRN
Qty: 1 INHALER | Refills: 1 | Status: SHIPPED | OUTPATIENT
Start: 2019-07-31 | End: 2019-07-31

## 2019-07-31 RX ORDER — ALBUTEROL SULFATE 90 UG/1
1-2 AEROSOL, METERED RESPIRATORY (INHALATION) EVERY 4 HOURS PRN
Qty: 3 INHALER | Refills: 3 | Status: SHIPPED | OUTPATIENT
Start: 2019-07-31 | End: 2019-09-25

## 2019-07-31 RX ORDER — LEVONORGESTREL / ETHINYL ESTRADIOL AND ETHINYL ESTRADIOL 150-30(84)
1 KIT ORAL DAILY
Qty: 91 TABLET | Refills: 3 | Status: SHIPPED | OUTPATIENT
Start: 2019-07-31 | End: 2019-08-12

## 2019-07-31 ASSESSMENT — MIFFLIN-ST. JEOR: SCORE: 2158

## 2019-07-31 ASSESSMENT — PAIN SCALES - GENERAL: PAINLEVEL: NO PAIN (0)

## 2019-07-31 NOTE — PROGRESS NOTES
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    Had concussion at work- recoverd now.  Need refill on oral contraceptive pills- no side effects from  Medications       PROBLEMS TO ADD ON...  Reviewed and updated as needed this visit by Provider  Meds         Review of Systems   ROS COMP: Constitutional, HEENT, cardiovascular, pulmonary, GI, , musculoskeletal, neuro, skin, endocrine and psych systems are negative, except as otherwise noted.      Objective    /72 (BP Location: Right arm, Patient Position: Sitting, Cuff Size: Adult Large)   Pulse 84   Temp 98.4  F (36.9  C) (Oral)   Resp 16   Ht 1.727 m (5' 8\")   Wt 138 kg (304 lb 2 oz)   LMP 04/01/2019 (Exact Date)   SpO2 97%   Breastfeeding? No   BMI 46.24 kg/m    Body mass index is 46.24 kg/m .  Physical Exam   GENERAL: healthy, alert and no distress  NECK: no adenopathy, no asymmetry, masses, or scars and thyroid normal to palpation  RESP: lungs clear to auscultation - no rales, rhonchi or wheezes  CV: regular rate and rhythm, normal S1 S2, no S3 or S4, no murmur, click or rub, no peripheral edema and peripheral pulses strong  ABDOMEN: soft, nontender, no hepatosplenomegaly, no masses and bowel sounds normal  MS: no gross musculoskeletal defects noted, no edema    Diagnostic Test Results:  Labs reviewed in Epic        Assessment & Plan     1. Family planning  Plan: refill for 1 yr.  Follow up next yr for pap   - levonorgest-eth estrad 91-Day (AMETHIA) 0.15-0.03 &0.01 MG tablet; Take 1 tablet by mouth daily  " "Dispense: 91 tablet; Refill: 3    2. Mild persistent asthma without complication  Well controlled on fluticasone   - albuterol (PROAIR HFA/PROVENTIL HFA/VENTOLIN HFA) 108 (90 Base) MCG/ACT inhaler; Inhale 1-2 puffs into the lungs every 4 hours as needed for shortness of breath / dyspnea or wheezing  Dispense: 1 Inhaler; Refill: 1  - fluticasone (ARNUITY ELLIPTA) 100 MCG/ACT inhaler; Inhale 1 puff into the lungs daily  Dispense: 90 each; Refill: 3     BMI:   Estimated body mass index is 46.24 kg/m  as calculated from the following:    Height as of this encounter: 1.727 m (5' 8\").    Weight as of this encounter: 138 kg (304 lb 2 oz).   Weight management plan: Discussed healthy diet and exercise guidelines            Return in about 6 months (around 1/31/2020) for asthma recheck.    Dotty Bush MD  [facility]      "

## 2019-07-31 NOTE — NURSING NOTE
"Chief Complaint   Patient presents with     Medication Reconciliation     /72 (BP Location: Right arm, Patient Position: Sitting, Cuff Size: Adult Large)   Pulse 84   Temp 98.4  F (36.9  C) (Oral)   Resp 16   Ht 1.727 m (5' 8\")   Wt 138 kg (304 lb 2 oz)   LMP 04/01/2019 (Exact Date)   SpO2 97%   Breastfeeding? No   BMI 46.24 kg/m   Estimated body mass index is 46.24 kg/m  as calculated from the following:    Height as of this encounter: 1.727 m (5' 8\").    Weight as of this encounter: 138 kg (304 lb 2 oz).  bp completed using cuff size: regular      Health Maintenance addressed:  NONE    N/a  Mariya Gonzalez CMA on 7/31/2019 at 3:17 PM'              "

## 2019-07-31 NOTE — LETTER
My Asthma Action Plan  Name: Karen Buenrostro   YOB: 1990  Date: 7/31/2019   My doctor: Dotty Bush MD   My clinic: [facility]        My Control Medicine: Fluticasone furoate (Arnuity Ellipta) -  100 mcg twice a day  My Rescue Medicine: Albuterol (Proair/Ventolin/Proventil) inhaler as needed   My Asthma Severity: mild persistent  Avoid your asthma triggers: cold air               GREEN ZONE   Good Control    I feel good    No cough or wheeze    Can work, sleep and play without asthma symptoms       Take your asthma control medicine every day.     1. If exercise triggers your asthma, take your rescue medication    15 minutes before exercise or sports, and    During exercise if you have asthma symptoms  2. Spacer to use with inhaler: If you have a spacer, make sure to use it with your inhaler             YELLOW ZONE Getting Worse  I have ANY of these:    I do not feel good    Cough or wheeze    Chest feels tight    Wake up at night   1. Keep taking your Green Zone medications  2. Start taking your rescue medicine:    every 20 minutes for up to 1 hour. Then every 4 hours for 24-48 hours.  3. If you stay in the Yellow Zone for more than 12-24 hours, contact your doctor.  4. If you do not return to the Green Zone in 12-24 hours or you get worse, start taking your oral steroid medicine if prescribed by your provider.           RED ZONE Medical Alert - Get Help  I have ANY of these:    I feel awful    Medicine is not helping    Breathing getting harder    Trouble walking or talking    Nose opens wide to breathe       1. Take your rescue medicine NOW  2. If your provider has prescribed an oral steroid medicine, start taking it NOW  3. Call your doctor NOW  4. If you are still in the Red Zone after 20 minutes and you have not reached your doctor:    Take your rescue medicine again and    Call 911 or go to the emergency room right away    See your regular doctor within 2 weeks of an  Emergency Room or Urgent Care visit for follow-up treatment.          Annual Reminders:  Meet with Asthma Educator,  Flu Shot in the Fall, consider Pneumonia Vaccination for patients with asthma (aged 19 and older).    Pharmacy:    [id number] - [place], [place] - [address]  [id number] [place], [place] - [address]  [id number] [place], [place] - [address] AT [address] [place] [address] & [facility]                      Asthma Triggers  How To Control Things That Make Your Asthma Worse    Triggers are things that make your asthma worse.  Look at the list below to help you find your triggers and what you can do about them.  You can help prevent asthma flare-ups by staying away from your triggers.      Trigger                                                          What you can do   Cigarette Smoke  Tobacco smoke can make asthma worse. Do not allow smoking in your home, car or around you.  Be sure no one smokes at a child s day care or school.  If you smoke, ask your health care provider for ways to help you quit.  Ask family members to quit too.  Ask your health care provider for a referral to Quit Plan to help you quit smoking, or call 5-245-348-PLAN.     Colds, Flu, Bronchitis  These are common triggers of asthma. Wash your hands often.  Don t touch your eyes, nose or mouth.  Get a flu shot every year.     Dust Mites  These are tiny bugs that live in cloth or carpet. They are too small to see. Wash sheets and blankets in hot water every week.   Encase pillows and mattress in dust mite proof covers.  Avoid having carpet if you can. If you have carpet, vacuum weekly.   Use a dust mask and HEPA vacuum.   Pollen and Outdoor Mold  Some people are allergic to trees, grass, or weed pollen, or molds. Try to keep your windows closed.  Limit time out doors when pollen count is high.   Ask you health care provider about taking medicine during allergy season.     Animal  Dander  Some people are allergic to skin flakes, urine or saliva from pets with fur or feathers. Keep pets with fur or feathers out of your home.    If you can t keep the pet outdoors, then keep the pet out of your bedroom.  Keep the bedroom door closed.  Keep pets off cloth furniture and away from stuffed toys.     Mice, Rats, and Cockroaches  Some people are allergic to the waste from these pests.   Cover food and garbage.  Clean up spills and food crumbs.  Store grease in the refrigerator.   Keep food out of the bedroom.   Indoor Mold  This can be a trigger if your home has high moisture. Fix leaking faucets, pipes, or other sources of water.   Clean moldy surfaces.  Dehumidify basement if it is damp and smelly.   Smoke, Strong Odors, and Sprays  These can reduce air quality. Stay away from strong odors and sprays, such as perfume, powder, hair spray, paints, smoke incense, paint, cleaning products, candles and new carpet.   Exercise or Sports  Some people with asthma have this trigger. Be active!  Ask your doctor about taking medicine before sports or exercise to prevent symptoms.    Warm up for 5-10 minutes before and after sports or exercise.     Other Triggers of Asthma  Cold air:  Cover your nose and mouth with a scarf.  Sometimes laughing or crying can be a trigger.  Some medicines and food can trigger asthma.

## 2019-08-01 ASSESSMENT — ASTHMA QUESTIONNAIRES: ACT_TOTALSCORE: 20

## 2019-08-11 DIAGNOSIS — Z30.09 FAMILY PLANNING: ICD-10-CM

## 2019-08-11 NOTE — TELEPHONE ENCOUNTER
"Requested Prescriptions   Pending Prescriptions Disp Refills     levonorgest-eth estrad 91-Day (AMETHIA) 0.15-0.03 &0.01 MG tablet  Last Written Prescription Date:  07/31/2019  Last Fill Quantity: 91 tablet,  # refills: 0   Last Office Visit: 12/31/2018 Ashley Head MD  Future Office Visit:      91 tablet 3     Sig: Take 1 tablet by mouth daily       Contraceptives Protocol Passed - 8/11/2019  5:19 PM        Passed - Patient is not a current smoker if age is 35 or older        Passed - Recent (12 mo) or future (30 days) visit within the authorizing provider's specialty     Patient had office visit in the last 12 months or has a visit in the next 30 days with authorizing provider or within the authorizing provider's specialty.  See \"Patient Info\" tab in inbasket, or \"Choose Columns\" in Meds & Orders section of the refill encounter.              Passed - Medication is active on med list        Passed - No active pregnancy on record        Passed - No positive pregnancy test in past 12 months          "

## 2019-08-12 RX ORDER — LEVONORGESTREL / ETHINYL ESTRADIOL AND ETHINYL ESTRADIOL 150-30(84)
1 KIT ORAL DAILY
Qty: 91 TABLET | Refills: 3 | Status: SHIPPED | OUTPATIENT
Start: 2019-08-12 | End: 2020-08-26

## 2019-09-25 DIAGNOSIS — J45.30 MILD PERSISTENT ASTHMA WITHOUT COMPLICATION: ICD-10-CM

## 2019-09-25 RX ORDER — ALBUTEROL SULFATE 90 UG/1
1-2 AEROSOL, METERED RESPIRATORY (INHALATION) EVERY 4 HOURS PRN
Qty: 3 INHALER | Refills: 3 | Status: SHIPPED | OUTPATIENT
Start: 2019-09-25 | End: 2020-03-29

## 2019-09-25 NOTE — TELEPHONE ENCOUNTER
"Requested Prescriptions   Pending Prescriptions Disp Refills     albuterol (PROAIR HFA/PROVENTIL HFA/VENTOLIN HFA) 108 (90 Base) MCG/ACT inhaler 3 Inhaler 3     Sig: Inhale 1-2 puffs into the lungs every 4 hours as needed for shortness of breath / dyspnea or wheezing  Last Written Prescription Date:  07/31/2019  Last Fill Quantity: 3 inhaler,  # refills: 3   Last office visit: 12/31/2018 with prescribing provider:  Ashley Head MD   Future Office Visit:           Asthma Maintenance Inhalers - Anticholinergics Passed - 9/25/2019  1:33 PM        Passed - Patient is age 12 years or older        Passed - Asthma control assessment score within normal limits in last 6 months     Please review ACT score.           Passed - Medication is active on med list        Passed - Recent (6 mo) or future (30 days) visit within the authorizing provider's specialty     Patient had office visit in the last 6 months or has a visit in the next 30 days with authorizing provider or within the authorizing provider's specialty.  See \"Patient Info\" tab in inbasket, or \"Choose Columns\" in Meds & Orders section of the refill encounter.              "

## 2019-10-16 ENCOUNTER — APPOINTMENT (OUTPATIENT)
Dept: GENERAL RADIOLOGY | Facility: CLINIC | Age: 29
End: 2019-10-16
Attending: EMERGENCY MEDICINE
Payer: COMMERCIAL

## 2019-10-16 ENCOUNTER — HOSPITAL ENCOUNTER (EMERGENCY)
Facility: CLINIC | Age: 29
Discharge: HOME | End: 2019-10-16
Attending: EMERGENCY MEDICINE | Admitting: EMERGENCY MEDICINE
Payer: COMMERCIAL

## 2019-10-16 VITALS
SYSTOLIC BLOOD PRESSURE: 138 MMHG | HEART RATE: 97 BPM | DIASTOLIC BLOOD PRESSURE: 94 MMHG | OXYGEN SATURATION: 99 % | RESPIRATION RATE: 18 BRPM | TEMPERATURE: 98 F

## 2019-10-16 DIAGNOSIS — R04.2 HEMOPTYSIS: ICD-10-CM

## 2019-10-16 DIAGNOSIS — R06.02 SOB (SHORTNESS OF BREATH): ICD-10-CM

## 2019-10-16 LAB
ANION GAP SERPL CALCULATED.3IONS-SCNC: 5 MMOL/L (ref 3–14)
B-HCG FREE SERPL-ACNC: <5 IU/L
BASOPHILS # BLD AUTO: 0 10E9/L (ref 0–0.2)
BASOPHILS NFR BLD AUTO: 0.1 %
BUN SERPL-MCNC: 13 MG/DL (ref 7–30)
CALCIUM SERPL-MCNC: 8.5 MG/DL (ref 8.5–10.1)
CHLORIDE SERPL-SCNC: 109 MMOL/L (ref 94–109)
CO2 SERPL-SCNC: 24 MMOL/L (ref 20–32)
CREAT BLD-MCNC: 0.7 MG/DL (ref 0.52–1.04)
CREAT SERPL-MCNC: 0.71 MG/DL (ref 0.52–1.04)
D DIMER PPP FEU-MCNC: <0.3 UG/ML FEU (ref 0–0.5)
DIFFERENTIAL METHOD BLD: NORMAL
EOSINOPHIL # BLD AUTO: 0.2 10E9/L (ref 0–0.7)
EOSINOPHIL NFR BLD AUTO: 2.8 %
ERYTHROCYTE [DISTWIDTH] IN BLOOD BY AUTOMATED COUNT: 12.8 % (ref 10–15)
GFR SERPL CREATININE-BSD FRML MDRD: >90 ML/MIN/{1.73_M2}
GFR SERPL CREATININE-BSD FRML MDRD: >90 ML/MIN/{1.73_M2}
GLUCOSE SERPL-MCNC: 95 MG/DL (ref 70–99)
HCT VFR BLD AUTO: 43.7 % (ref 35–47)
HGB BLD-MCNC: 14.9 G/DL (ref 11.7–15.7)
IMM GRANULOCYTES # BLD: 0 10E9/L (ref 0–0.4)
IMM GRANULOCYTES NFR BLD: 0.3 %
INTERPRETATION ECG - MUSE: NORMAL
LYMPHOCYTES # BLD AUTO: 3.1 10E9/L (ref 0.8–5.3)
LYMPHOCYTES NFR BLD AUTO: 36 %
MCH RBC QN AUTO: 29.7 PG (ref 26.5–33)
MCHC RBC AUTO-ENTMCNC: 34.1 G/DL (ref 31.5–36.5)
MCV RBC AUTO: 87 FL (ref 78–100)
MONOCYTES # BLD AUTO: 0.5 10E9/L (ref 0–1.3)
MONOCYTES NFR BLD AUTO: 5.7 %
NEUTROPHILS # BLD AUTO: 4.8 10E9/L (ref 1.6–8.3)
NEUTROPHILS NFR BLD AUTO: 55.1 %
NRBC # BLD AUTO: 0 10*3/UL
NRBC BLD AUTO-RTO: 0 /100
PLATELET # BLD AUTO: 225 10E9/L (ref 150–450)
POTASSIUM SERPL-SCNC: 3.9 MMOL/L (ref 3.4–5.3)
RBC # BLD AUTO: 5.01 10E12/L (ref 3.8–5.2)
SODIUM SERPL-SCNC: 138 MMOL/L (ref 133–144)
TROPONIN I SERPL-MCNC: <0.015 UG/L (ref 0–0.04)
WBC # BLD AUTO: 8.7 10E9/L (ref 4–11)

## 2019-10-16 PROCEDURE — 82565 ASSAY OF CREATININE: CPT | Mod: 91

## 2019-10-16 PROCEDURE — 99285 EMERGENCY DEPT VISIT HI MDM: CPT | Mod: 25

## 2019-10-16 PROCEDURE — 25000131 ZZH RX MED GY IP 250 OP 636 PS 637: Performed by: EMERGENCY MEDICINE

## 2019-10-16 PROCEDURE — 25000125 ZZHC RX 250

## 2019-10-16 PROCEDURE — 71046 X-RAY EXAM CHEST 2 VIEWS: CPT

## 2019-10-16 PROCEDURE — 84484 ASSAY OF TROPONIN QUANT: CPT | Performed by: EMERGENCY MEDICINE

## 2019-10-16 PROCEDURE — 84702 CHORIONIC GONADOTROPIN TEST: CPT

## 2019-10-16 PROCEDURE — 85379 FIBRIN DEGRADATION QUANT: CPT | Performed by: EMERGENCY MEDICINE

## 2019-10-16 PROCEDURE — 94640 AIRWAY INHALATION TREATMENT: CPT

## 2019-10-16 PROCEDURE — 25000125 ZZHC RX 250: Performed by: EMERGENCY MEDICINE

## 2019-10-16 PROCEDURE — 93005 ELECTROCARDIOGRAM TRACING: CPT

## 2019-10-16 PROCEDURE — 80048 BASIC METABOLIC PNL TOTAL CA: CPT | Performed by: EMERGENCY MEDICINE

## 2019-10-16 PROCEDURE — 85025 COMPLETE CBC W/AUTO DIFF WBC: CPT | Performed by: EMERGENCY MEDICINE

## 2019-10-16 RX ORDER — PREDNISONE 20 MG/1
40 TABLET ORAL ONCE
Status: COMPLETED | OUTPATIENT
Start: 2019-10-16 | End: 2019-10-16

## 2019-10-16 RX ORDER — IPRATROPIUM BROMIDE AND ALBUTEROL SULFATE 2.5; .5 MG/3ML; MG/3ML
3 SOLUTION RESPIRATORY (INHALATION) ONCE
Status: COMPLETED | OUTPATIENT
Start: 2019-10-16 | End: 2019-10-16

## 2019-10-16 RX ORDER — PREDNISONE 20 MG/1
40 TABLET ORAL DAILY
Qty: 10 TABLET | Refills: 0 | Status: SHIPPED | OUTPATIENT
Start: 2019-10-16 | End: 2020-03-27

## 2019-10-16 RX ORDER — IPRATROPIUM BROMIDE AND ALBUTEROL SULFATE 2.5; .5 MG/3ML; MG/3ML
SOLUTION RESPIRATORY (INHALATION)
Status: COMPLETED
Start: 2019-10-16 | End: 2019-10-16

## 2019-10-16 RX ADMIN — IPRATROPIUM BROMIDE AND ALBUTEROL SULFATE 3 ML: 2.5; .5 SOLUTION RESPIRATORY (INHALATION) at 05:16

## 2019-10-16 RX ADMIN — IPRATROPIUM BROMIDE AND ALBUTEROL SULFATE 3 ML: .5; 3 SOLUTION RESPIRATORY (INHALATION) at 05:16

## 2019-10-16 RX ADMIN — PREDNISONE 40 MG: 20 TABLET ORAL at 07:24

## 2019-10-16 RX ADMIN — IPRATROPIUM BROMIDE AND ALBUTEROL SULFATE 3 ML: .5; 3 SOLUTION RESPIRATORY (INHALATION) at 07:24

## 2019-10-16 ASSESSMENT — ENCOUNTER SYMPTOMS
COUGH: 1
FEVER: 0
VOMITING: 1
UNEXPECTED WEIGHT CHANGE: 0
SHORTNESS OF BREATH: 1

## 2019-10-16 NOTE — LETTER
October 16, 2019      To Whom It May Concern:      Karen Buenrostro was seen in our Emergency Department today, 10/16/19. She may return to work 10/17/19.    Sincerely,        Teresa Garland MD

## 2019-10-16 NOTE — ED TRIAGE NOTES
"Patient woke up around midnight short of breath.  Patient also reports coughing up bright red blood and \"coughing so much I puke\" began 0400.  Uses daily inhaler and used rescue inhaler 0200 without relief.  H/o asthma  "

## 2019-10-16 NOTE — ED PROVIDER NOTES
History     Chief Complaint:  Shortness of Breath    HPI  Karen Buenrostro is a 29 year old female with a history including non-alcoholic fatty liver disease, obesity, and asthma who presents to the emergency department today for evaluation of shortness of breath. The patient reports that she has had a cough for over a year. 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 She adds that she uses her rescue inhaler twice daily, especially when she is at work. The patient reports that she woke up around midnight feeling shortness of breath, coughing up bright red blood, and also vomiting thought to be due to her cough. She notes that she is very tired as well. She was given a Duoneb treatment here prior to my evaluation and she states that this was very helpful. She denies chest pain, fevers, or weight loss. She states that previously she has tasted blood in her sputum before, but never seen it. She also notes she ran out of her controller inhaler one week ago.     Allergies:  Hydroxyzine    Medications:    Albuterol inhaler  Arnuity ellipta  Levonorgest-eth estrad    Past Medical History:    Asthma  Obesity  Diverticulitis  Duodenitis  Migraines   Fatty liver disease, non-alcoholic  Post concussion syndrome  Vasovagal syndrome    Past Surgical History:    Cholecystectomy    Family History:    The patient's family history includes gallbladder disease.    Social History:  The patient reports that she has never smoked. She has never used smokeless tobacco. She reports current alcohol use. She reports that she does not use drugs.   PCP: Ashley Head  Marital Status: Single     Review of Systems   Constitutional: Negative for fever and unexpected weight change.        Positive for feeling tired.   Respiratory: Positive for cough and shortness of breath.          Positive for hemoptysis.   Cardiovascular: Negative for chest pain.   Gastrointestinal: Positive for vomiting.   All other systems reviewed and are negative.    Physical Exam     Patient Vitals for the past 24 hrs:   BP Temp Temp src Heart Rate Resp SpO2   10/16/19 0547 -- -- -- 82 18 97 %   10/16/19 0512 -- -- -- -- 20 98 %   10/16/19 0510 -- -- -- -- 20 98 %   10/16/19 0504 (!) 160/95 98  F (36.7  C) Oral 104 24 98 %     Physical Exam  General/Appearance: appears stated age, well-groomed, appears comfortable except for mild tachypnea, elevated BMI  Eyes: EOMI, no scleral injection, no icterus  ENT: MMM  Neck: supple, nl ROM, no stiffness  Cardiovascular: RRR, nl S1S2, no m/r/g, 2+ pulses in all 4 extremities, cap refill <2sec  Respiratory: CTAB, good air movement throughout, no wheezes/rhonchi/rales, mild tachypnea, no retractions  Back: no lesions  GI: abd soft, non-distended, nttp,  no HSM, no rebound, no guarding, nl BS  MSK: BARTH, good tone, no bony abnormality  Skin: warm and well-perfused, no rash, no edema, no ecchymosis, nl turgor  Neuro: GCS 15, alert and oriented, no gross focal neuro deficits  Psych: interacts appropriately  Heme: no petechia, no purpura, no active bleeding    Emergency Department Course     ECG:  ECG taken at 0522  Normal sinus rhythm  Normal ECG   Rate 82 bpm. AK interval 138. QRS duration 86. QT/QTc 372/434. P-R-T axes 10 21 -3.    Imaging:  Radiology findings were communicated with the patient who voiced understanding of the findings.    XR Chest 2 Views  No evidence of active cardiopulmonary disease.  MARICARMEN PLATA MD    Report(s) per radiology.     Laboratory:  Laboratory findings were communicated with the patient who voiced understanding of the findings.    Creatinine POCT: Creatinine 0.7, GFR estimate >90.   ISTAT HCG Quantitative Pregnancy POCT: <5.0  CBC: WBC 8.7, HGB 14.9, .  D dimer quantitative: <0.3  BMP: All within normal limits (Creatinine 0.71).  Troponin I:  <0.015    Interventions:  0516 DuoNeb 3 mL Nebulization  0724 DuoNeb 3 mL Nebulization  0724 Prednisone 40 mg Oral     Emergency Department Course:  0510 The patient was briefly evaluated by Dr. Soni prior to my full assessment and workup.   0522 An ECG was performed, results above.   0532 IV was inserted and blood was drawn for laboratory testing, results above.  0616 The patient was sent for XR Chest while in the emergency department, results above.   0701 I performed an examination of the patient as documented above.  0747 I personally reviewed the laboratory and imaging results with the patient and answered all related questions prior to discharge.    Impression & Plan      Medical Decision Making:  Karen Buenrostro is a 29 year old female with history of uncontrolled asthma who presents with likely the same.  She has a chronic cough secondary to asthma, recently ran out of her controller inhaler.  It sounds as though she frequently has mild hemoptysis but never gross hemoptysis.  Today she noticed visible blood, prompting her ER visit.  She has no PE risk factors and d-dimer is negative.  Chest x-ray does not show any obvious tumor, mass, infiltrate.  Troponin and other cardiac work-up is negative.  Hemoglobin and platelets are normal.  I suspect this hemoptysis is secondary to chronic irritation from this chronic cough.  She feels better after the DuoNeb.  Will restart her on her controller inhaler and send her home with prednisone burst.    Diagnosis:    ICD-10-CM    1. SOB (shortness of breath) R06.02    2. Hemoptysis R04.2      Disposition:   Findings and plan explained to the patient. Patient discharged home with instructions regarding supportive care, medications, and reasons to return. The importance of close follow-up was reviewed. The patient was prescribed prednisone as detailed below.    Discharge Medications:  START taking      Dose / Directions   predniSONE 20 MG tablet  Commonly known as:   DELTASONE      Dose:  40 mg  Take 2 tablets (40 mg) by mouth daily for 5 days  Quantity:  10 tablet  Refills:  0       Scribe Disclosure:  I, Serjio Dorsey, am serving as a scribe at 6:54 AM on 10/16/2019 to document services personally performed by Teresa Garland MD based on my observations and the provider's statements to me.     EMERGENCY DEPARTMENT       Teresa Garland MD  10/16/19 0801

## 2019-10-16 NOTE — ED AVS SNAPSHOT
Emergency Department  [address] [facility] [address]  Phone:  [phone]  Fax:  [phone]                                    Karen Buenrostro   MRN: [id number]    Department:   Emergency Department   Date of Visit:  10/16/2019           After Visit Summary Signature Page    I have received my discharge instructions, and my questions have been answered. I have discussed any challenges I see with this plan with the nurse or doctor.    ..........................................................................................................................................  Patient/Patient Representative Signature      ..........................................................................................................................................  Patient Representative Print Name and Relationship to Patient    ..................................................               ................................................  Date                                   Time    ..........................................................................................................................................  Reviewed by Signature/Title    ...................................................              ..............................................  Date                                               Time          22EPIC Rev 08/18

## 2019-12-31 NOTE — TELEPHONE ENCOUNTER
"Last Written Prescription Date:  Duplicate  Last Fill Quantity: ,  # refills:    Last office visit: 12/31/2018 with prescribing provider:     Future Office Visit:    Requested Prescriptions   Pending Prescriptions Disp Refills     fluticasone (ARNUITY ELLIPTA) 100 MCG/ACT inhaler 1 each 1     Sig: Inhale 1 puff into the lungs daily       Inhaled Steroids Protocol Failed - 12/27/2019 12:14 PM        Failed - Recent (6 mo) or future (30 days) visit within the authorizing provider's specialty     Patient had office visit in the last 6 months or has a visit in the next 30 days with authorizing provider or within the authorizing provider's specialty.  See \"Patient Info\" tab in inbasket, or \"Choose Columns\" in Meds & Orders section of the refill encounter.            Passed - Patient is age 12 or older        Passed - Asthma control assessment score within normal limits in last 6 months     Please review ACT score.           Passed - Medication is active on med list          "

## 2020-01-03 ENCOUNTER — HOSPITAL ENCOUNTER (EMERGENCY)
Facility: CLINIC | Age: 30
Discharge: HOME | End: 2020-01-03
Attending: EMERGENCY MEDICINE | Admitting: EMERGENCY MEDICINE
Payer: COMMERCIAL

## 2020-01-03 VITALS
SYSTOLIC BLOOD PRESSURE: 137 MMHG | DIASTOLIC BLOOD PRESSURE: 88 MMHG | OXYGEN SATURATION: 100 % | RESPIRATION RATE: 16 BRPM | HEART RATE: 80 BPM | TEMPERATURE: 98.2 F

## 2020-01-03 DIAGNOSIS — K57.32 DIVERTICULITIS OF COLON: ICD-10-CM

## 2020-01-03 LAB
ALBUMIN SERPL-MCNC: 3.6 G/DL (ref 3.4–5)
ALP SERPL-CCNC: 88 U/L (ref 40–150)
ALT SERPL W P-5'-P-CCNC: 17 U/L (ref 0–50)
ANION GAP SERPL CALCULATED.3IONS-SCNC: 5 MMOL/L (ref 3–14)
AST SERPL W P-5'-P-CCNC: 16 U/L (ref 0–45)
B-HCG FREE SERPL-ACNC: <5 IU/L
BASOPHILS # BLD AUTO: 0 10E9/L (ref 0–0.2)
BASOPHILS NFR BLD AUTO: 0.1 %
BILIRUB SERPL-MCNC: 0.5 MG/DL (ref 0.2–1.3)
BUN SERPL-MCNC: 14 MG/DL (ref 7–30)
CALCIUM SERPL-MCNC: 9.3 MG/DL (ref 8.5–10.1)
CHLORIDE SERPL-SCNC: 109 MMOL/L (ref 94–109)
CO2 SERPL-SCNC: 26 MMOL/L (ref 20–32)
CREAT SERPL-MCNC: 0.62 MG/DL (ref 0.52–1.04)
DIFFERENTIAL METHOD BLD: ABNORMAL
EOSINOPHIL # BLD AUTO: 0.2 10E9/L (ref 0–0.7)
EOSINOPHIL NFR BLD AUTO: 1.1 %
ERYTHROCYTE [DISTWIDTH] IN BLOOD BY AUTOMATED COUNT: 13.1 % (ref 10–15)
GFR SERPL CREATININE-BSD FRML MDRD: >90 ML/MIN/{1.73_M2}
GLUCOSE SERPL-MCNC: 83 MG/DL (ref 70–99)
HCT VFR BLD AUTO: 44.8 % (ref 35–47)
HGB BLD-MCNC: 15.1 G/DL (ref 11.7–15.7)
IMM GRANULOCYTES # BLD: 0.1 10E9/L (ref 0–0.4)
IMM GRANULOCYTES NFR BLD: 0.5 %
LIPASE SERPL-CCNC: 126 U/L (ref 73–393)
LYMPHOCYTES # BLD AUTO: 2.8 10E9/L (ref 0.8–5.3)
LYMPHOCYTES NFR BLD AUTO: 19.3 %
MCH RBC QN AUTO: 29.5 PG (ref 26.5–33)
MCHC RBC AUTO-ENTMCNC: 33.7 G/DL (ref 31.5–36.5)
MCV RBC AUTO: 88 FL (ref 78–100)
MONOCYTES # BLD AUTO: 1 10E9/L (ref 0–1.3)
MONOCYTES NFR BLD AUTO: 7.2 %
NEUTROPHILS # BLD AUTO: 10.3 10E9/L (ref 1.6–8.3)
NEUTROPHILS NFR BLD AUTO: 71.8 %
NRBC # BLD AUTO: 0 10*3/UL
NRBC BLD AUTO-RTO: 0 /100
PLATELET # BLD AUTO: 268 10E9/L (ref 150–450)
POTASSIUM SERPL-SCNC: 4.2 MMOL/L (ref 3.4–5.3)
PROT SERPL-MCNC: 7.8 G/DL (ref 6.8–8.8)
RBC # BLD AUTO: 5.11 10E12/L (ref 3.8–5.2)
SODIUM SERPL-SCNC: 140 MMOL/L (ref 133–144)
WBC # BLD AUTO: 14.4 10E9/L (ref 4–11)

## 2020-01-03 PROCEDURE — 25800030 ZZH RX IP 258 OP 636: Performed by: EMERGENCY MEDICINE

## 2020-01-03 PROCEDURE — 96374 THER/PROPH/DIAG INJ IV PUSH: CPT

## 2020-01-03 PROCEDURE — 25000128 H RX IP 250 OP 636: Performed by: EMERGENCY MEDICINE

## 2020-01-03 PROCEDURE — 84702 CHORIONIC GONADOTROPIN TEST: CPT

## 2020-01-03 PROCEDURE — 83690 ASSAY OF LIPASE: CPT | Performed by: EMERGENCY MEDICINE

## 2020-01-03 PROCEDURE — 99284 EMERGENCY DEPT VISIT MOD MDM: CPT | Mod: 25

## 2020-01-03 PROCEDURE — 85025 COMPLETE CBC W/AUTO DIFF WBC: CPT | Performed by: EMERGENCY MEDICINE

## 2020-01-03 PROCEDURE — 96361 HYDRATE IV INFUSION ADD-ON: CPT

## 2020-01-03 PROCEDURE — 80053 COMPREHEN METABOLIC PANEL: CPT | Performed by: EMERGENCY MEDICINE

## 2020-01-03 RX ORDER — KETOROLAC TROMETHAMINE 15 MG/ML
15 INJECTION, SOLUTION INTRAMUSCULAR; INTRAVENOUS ONCE
Status: COMPLETED | OUTPATIENT
Start: 2020-01-03 | End: 2020-01-03

## 2020-01-03 RX ADMIN — SODIUM CHLORIDE 1000 ML: 9 INJECTION, SOLUTION INTRAVENOUS at 14:32

## 2020-01-03 RX ADMIN — KETOROLAC TROMETHAMINE 15 MG: 15 INJECTION, SOLUTION INTRAMUSCULAR; INTRAVENOUS at 14:37

## 2020-01-03 ASSESSMENT — ENCOUNTER SYMPTOMS
DIFFICULTY URINATING: 0
DIARRHEA: 0
CHILLS: 0
ABDOMINAL PAIN: 1
DYSURIA: 0
HEMATURIA: 0
CONSTIPATION: 0
FREQUENCY: 0

## 2020-01-03 NOTE — ED AVS SNAPSHOT
Emergency Department  [address] [facility] [address]  Phone:  [phone]  Fax:  [phone]                                    Karen Buenrostro   MRN: [id number]    Department:   Emergency Department   Date of Visit:  1/3/2020           After Visit Summary Signature Page    I have received my discharge instructions, and my questions have been answered. I have discussed any challenges I see with this plan with the nurse or doctor.    ..........................................................................................................................................  Patient/Patient Representative Signature      ..........................................................................................................................................  Patient Representative Print Name and Relationship to Patient    ..................................................               ................................................  Date                                   Time    ..........................................................................................................................................  Reviewed by Signature/Title    ...................................................              ..............................................  Date                                               Time          22EPIC Rev 08/18

## 2020-01-03 NOTE — ED PROVIDER NOTES
History     Chief Complaint:  Abdominal Pain    HPI   Karen Buenrostro is a 29 year old female with a history of fatty liver disease, duodenitis, erosive gastritis, C. Difficile diarrhea, and diverticulitis among others who presents with abdominal pain. The patient reports the development of left upper quadrant pain similar to with previous bouts of diverticulitis beginning last night. She reports no diarrhea, constipation, urinary changes, or chills. The patient reported having a cold with fever on 3-4 days prior.     Allergies:  Hydroxyzine     Medications:    Albuterol  Arnuity ellipta  Amethia    Past Medical History:    Asthma  Morbid obesity  Diverticulitis  Duodenitis   Fatty liver disease  Post concussion syndrome  Migraine  Erosive gastritis  Elevated liver enzymes  Vasovagal syncope  Premenstrual syndrome  C. Difficile diarrhea  Cholelithiasis  Concussion    Past Surgical History:    Cholecystectomy     Family History:    Mother: gallbladder disease    Social History:  The patient was accompanied to the ED by her .  Smoking Status: Never Smoker  Smokeless Tobacco: Never Used  Alcohol Use: Positive  Drug Use: Negative  PCP: Ashley Head  Marital Status:  Single      Review of Systems   Constitutional: Negative for chills.   Gastrointestinal: Positive for abdominal pain. Negative for constipation and diarrhea.   Genitourinary: Negative for difficulty urinating, dysuria, frequency, hematuria and urgency.   All other systems reviewed and are negative.      Physical Exam     Patient Vitals for the past 24 hrs:   BP Temp Pulse Resp SpO2   01/03/20 1530 137/88 -- 80 -- 100 %   01/03/20 1500 135/78 -- 74 -- 97 %   01/03/20 1435 -- -- -- -- 95 %   01/03/20 1415 (!) 173/94 -- 82 -- --   01/03/20 1414 -- -- -- -- 97 %   01/03/20 1148 (!) 159/86 98.2  F (36.8  C) 94 16 96 %     Physical Exam  GENERAL: well developed, pleasant  HEAD: atraumatic  EYES: pupils reactive, extraocular muscles intact,  conjunctivae normal  ENT:  mucus membranes moist  NECK:  trachea midline, normal range of motion  RESPIRATORY: no tachypnea, breath sounds clear to auscultation   CVS: normal S1/S2, no murmurs, intact distal pulses  ABDOMEN: soft, mild left upper quadrant pain, no guarding, no rebound, nondistention  MUSCULOSKELETAL: no deformities  SKIN: warm and dry, no acute rashes or ulceration  NEURO: GCS 15, cranial nerves intact, alert and oriented x3  PSYCH:  Mood/affect normal    Emergency Department Course     Laboratory:  Laboratory findings were communicated with the patient who voiced understanding of the findings.    ISTAT HCG Quantitative: <5.0    CBC: WBC 14.4 (H), HGB 15.1,   CMP: WNL (Creatinine 0.62)  Lipase: 126    Interventions:  1432 NS 1000 ml IV  1437 Toradol 15 mg IV    Emergency Department Course:    1408 IV was inserted and blood was drawn for laboratory testing, results above.    1413 ISTAT HCG obtained as noted above.    1420 Nursing notes and vitals reviewed.    1431 I performed an exam of the patient as documented above.     Findings and plan explained to the patient. Patient discharged home with instructions regarding supportive care, medications, and reasons to return. The importance of close follow-up was reviewed. The patient was prescribed Augmentin.    Impression & Plan      Medical Decision Making:  Karen Buenrostro is a 29 year old female who presents to the emergency department today for evaluation of left upper quadrant abdominal pain.  She notes that she has had this the past and was diagnosed with diverticulitis.  I reviewed those images.  Labs show mildly elevated white blood cell count.  She has no rebound or guarding.  Do not feel that she has an abscess.  Given her prior imaging and symptoms feeling very similar we will restart her on antibiotics for diverticulitis.  Looks like she did not tolerate the Cipro Flagyl combination and tolerated Augmentin.    Diagnosis:     ICD-10-CM    1. Diverticulitis of colon K57.32      Disposition:   The patient is discharged to home.    Discharge Medications:  Discharge Medication List as of 1/3/2020  3:41 PM      START taking these medications    Details   amoxicillin-clavulanate (AUGMENTIN) 875-125 MG tablet Take 1 tablet by mouth 2 times daily for 10 days, Disp-20 tablet, R-0, Local Print             Scribe Disclosure:  I, Lorena Edwards, am serving as a scribe at 3:46 PM on 1/3/2020 to document services personally performed by Lucas Nance MD based on my observations and the provider's statements to me.     EMERGENCY DEPARTMENT       Lucas Nance MD  01/03/20 3525

## 2020-01-06 ENCOUNTER — TELEPHONE (OUTPATIENT)
Dept: CARE COORDINATION | Facility: CLINIC | Age: 30
End: 2020-01-06

## 2020-01-06 ENCOUNTER — PATIENT OUTREACH (OUTPATIENT)
Dept: CARE COORDINATION | Facility: CLINIC | Age: 30
End: 2020-01-06

## 2020-01-06 DIAGNOSIS — Z71.89 OTHER SPECIFIED COUNSELING: ICD-10-CM

## 2020-01-06 NOTE — TELEPHONE ENCOUNTER
[facility] Referral: Attempt 1  Community Health Worker called and left a message for the patient in order to discuss enrollment with Clinic Care Coordination.    If the patient is returning my call, please transfer her to me, Delfino, at [phone].    Per chart review  ED for left upper quadrant abdominal pain  F/U with PCP    Order Details   Proc category: Referral Class: Local Print   Standing status: Normal Order status: Sent   Enc provider: Ashley Head MD Enc department:  Care Coordination   Order date: 1/6/2020 Order user: Rufus Albarran LSW   Visit type: [facility] PHONE VISIT Appointment Window:     Comments   CHW to outreach  Referral made off of discharge report.   _____________________  Next outreach: 01/07/20

## 2020-01-06 NOTE — TELEPHONE ENCOUNTER
Care Coordination reached out to patient after a recent discharge from ED to offer CC services. Patient denied at this time, however, was wondering if there was anything that Dr Carty can do as she was not sent home with any sort of pain medications and patient is still experiencing a lot of pain. Any questions please contact the patient.

## 2020-01-06 NOTE — PROGRESS NOTES
The clinic Community Health Worker spoke with the patient today at the request of the PCP to discuss possible Clinic Care Coordination enrollment.  The service was described to the patient and immediate needs were discussed.  The patient declined enrollement at this time.  The PCP is encouraged to refer in the future if the patient's needs change.    Karen requested for CHW to send message to Dr Carty as patient is in a lot of pain and the ED MD did not prescribe any medications for her.

## 2020-01-07 NOTE — TELEPHONE ENCOUNTER
Placed call to patient. No answer, left message requesting call back at [facility] direct line.    Riri ZAMORA RN

## 2020-03-27 ENCOUNTER — E-VISIT (OUTPATIENT)
Dept: PEDIATRICS | Facility: CLINIC | Age: 30
End: 2020-03-27
Payer: COMMERCIAL

## 2020-03-27 DIAGNOSIS — J45.30 MILD PERSISTENT ASTHMA WITHOUT COMPLICATION: ICD-10-CM

## 2020-03-27 DIAGNOSIS — R05.9 COUGH: Primary | ICD-10-CM

## 2020-03-27 PROCEDURE — 99422 OL DIG E/M SVC 11-20 MIN: CPT | Performed by: INTERNAL MEDICINE

## 2020-03-27 NOTE — TELEPHONE ENCOUNTER
Spoke with patient, she will sign up for mychart and do evisit for refills on Annuity Ellipta, chart closed.

## 2020-03-29 RX ORDER — ALBUTEROL SULFATE 90 UG/1
1-2 AEROSOL, METERED RESPIRATORY (INHALATION) EVERY 4 HOURS PRN
Qty: 1 INHALER | Refills: 0 | Status: SHIPPED | OUTPATIENT
Start: 2020-03-29

## 2020-03-30 NOTE — TELEPHONE ENCOUNTER
Provider E-Visit time total (minutes): 15    Please contact her and make office visit appt in 1 month. May be converted to phone visit if she is doing well. OK to overbook with physical that may get rescheduled.    Ashley Head M.D.

## 2020-03-31 NOTE — PATIENT INSTRUCTIONS
1. I think you probably have bronchitis. Please take the antibiotics to be sure it doesn't turn into pneumonia  2. Fluids, rest  3. Come see me Monday if not feeling better. Please be seen urgently for shortness of breath, etc  4. Please follow up with Dr. Head to discuss frequent bronchitis      Preventive Health Recommendations  Female Ages 26 - 39  Yearly exam:   See your health care provider every year in order to    Review health changes.     Discuss preventive care.      Review your medicines if you your doctor has prescribed any.    Until age 30: Get a Pap test every three years (more often if you have had an abnormal result).    After age 30: Talk to your doctor about whether you should have a Pap test every 3 years or have a Pap test with HPV screening every 5 years.   You do not need a Pap test if your uterus was removed (hysterectomy) and you have not had cancer.  You should be tested each year for STDs (sexually transmitted diseases), if you're at risk.   Talk to your provider about how often to have your cholesterol checked.  If you are at risk for diabetes, you should have a diabetes test (fasting glucose).  Shots: Get a flu shot each year. Get a tetanus shot every 10 years.   Nutrition:     Eat at least 5 servings of fruits and vegetables each day.    Eat whole-grain bread, whole-wheat pasta and brown rice instead of white grains and rice.    Get adequate Calcium and Vitamin D.     Lifestyle    Exercise at least 150 minutes a week (30 minutes a day, 5 days of the week). This will help you control your weight and prevent disease.    Limit alcohol to one drink per day.    No smoking.     Wear sunscreen to prevent skin cancer.    See your dentist every six months for an exam and cleaning.    
Patient received from ED Dsating to 77%. Made Dr. Osullivan aware. Anesthesia notified for intubation

## 2020-04-22 ENCOUNTER — VIRTUAL VISIT (OUTPATIENT)
Dept: PEDIATRICS | Facility: CLINIC | Age: 30
End: 2020-04-22
Payer: COMMERCIAL

## 2020-04-22 DIAGNOSIS — Z20.822 SUSPECTED COVID-19 VIRUS INFECTION: Primary | ICD-10-CM

## 2020-04-22 DIAGNOSIS — J45.30 MILD PERSISTENT ASTHMA WITHOUT COMPLICATION: ICD-10-CM

## 2020-04-22 PROCEDURE — 99213 OFFICE O/P EST LOW 20 MIN: CPT | Mod: 95 | Performed by: INTERNAL MEDICINE

## 2020-04-22 NOTE — LETTER
[facility]  [address]  [address]  [facility] [place] [facility] [address]  Phone: [phone]  Fax: [phone]    April 22, 2020        Karen SHIRAZ Buenrostro  [address] [address]          To whom it may concern:    RE: Karen Buenrostro    Karen Buenrostro has been evaluated and needs to remain out of work to isolate for 7 days from when symptoms started AND 72 hours after fever resolves (without fever reducing medications) AND improvement of respiratory symptoms (whichever is longer). Would expect at least 14 days in isolation.      Please contact me for questions or concerns.      Sincerely,        Ashley Head MD

## 2020-04-22 NOTE — PATIENT INSTRUCTIONS
Our nurse, Riri, has a direct line you can call if you need anything.  [phone]    Instructions for Patients  Your symptoms could be due to COVID-19, but it also could be due to a number of other respiratory illnesses.  We are not doing testing at this time for COVID-19 unless symptoms are severe enough to require hospitalization.  It is recommended that you stay home to prevent the spread of your illness.  To do this follow these instructions:    Regardless of if you have been tested or not:  Patient who have symptoms (cough, fever, or shortness of breath), need to isolate for 7 days from when symptoms started AND 72 hours after fever resolves (without fever reducing medications) AND improvement of respiratory symptoms (whichever is longer).      Isolate yourself at home (in own room/own bathroom if possible)    Do Not allow any visitors    Do Not go to work or school    Do Not go to Judaism,  centers, shopping, or other public places.    Do Not shake hands.    Avoid close and intimate contact with others (hugging, kissing).    Follow CDC recommendations for household cleaning of frequently touched services.     After the initial 7 days, continue to isolate yourself from household members as much as possible. To continue decrease the risk of community spread and exposure, you and any members of your household should limit activities in public for 14 days after starting home isolation.     You can reference the following CDC link for helpful home isolation/care tips:  https://www.cdc.gov/coronavirus/2019-ncov/downloads/10Things.pdf    Protect Others:    Cover your mouth and nose with a mask, disposable tissue or wash cloth to avoid spreading germs to others.    Wash your hands and face frequently with soap and water.    Fever Medicines:    For fever relief, take acetaminophen or ibuprofen.    Treat fevers above 101  F (38.3  C) to lower fevers and make you more comfortable.     Acetaminophen (e.g.,  Tylenol): Take 650 mg (two 325 mg pills) by mouth every 4-6 hours as needed of regular strength Tylenol or 1,000 mg (two 500 mg pills) every 8 hours as needed of Extra Strength Tylenol.     Ibuprofen (e.g., Motrin, Advil): Take 400 mg (two 200 mg pills) by mouth every 6 hours as needed.     Acetaminophen is thought to be safer than ibuprofen or naproxen for people over 65 years old. Acetaminophen is in many OTC and prescription medicines. It might be in more than one medicine that you are taking. You need to be careful and not take an overdose. Before taking any medicine, read all the instructions on the package.    Caution - NSAIDs (e.g., ibuprofen, naproxen): Do not take nonsteroidal anti-inflammatory drugs (NSAIDs) if you have stomach problems, kidney disease, heart failure, or other contraindications to using this type of medicine. Do not take NSAID medicines for over 7 days without consulting your PCP. Do not take NSAID medicines if you are pregnant. Do not take NSAID medicines if you are also taking blood thinners.     Call Back If: Breathing difficulty develops or you become worse.    Get Well Loop Information  We know it can be scary to hear that you might have COVID-19. Our team can help track your symptoms and make sure you are doing ok over the next two weeks using a program called [employer] to keep in touch. When you receive an email from [employer], please consider enrolling in our monitoring program. There is no cost to you for monitoring. Here is a URL where you can learn more: [email or web address]      Thank you for limiting contact with others, wearing a simple mask to cover your cough, practice good hand hygiene habits and accessing our virtual services where possible to limit the spread of this virus.    For more information about COVID19 and options for caring for yourself at home, please visit the CDC website at  https://www.cdc.gov/coronavirus/2019-ncov/about/steps-when-sick.html  For more options for care at [facility], please visit our website at [email or web address]    For more information, please use the [place] Department of Health COVID-19 Website: [email or web address]/[email or web address]  [place] Department of Health ([facility]) COVID-19 Hotlines (Interpreters available):      Health questions: Phone Number: [phone] or [phone] and Hours: 7 a.m. to 7 p.m.    Schools and  questions: Phone Number: [phone] or [phone] and Hours 7 a.m. to 7 p.m.

## 2020-04-22 NOTE — PROGRESS NOTES
"Karen Buenrostro is a 29 year old female who is being evaluated via a billable telephone visit.      The patient has been notified of following:     \"This telephone visit will be conducted via a call between you and your physician/provider. We have found that certain health care needs can be provided without the need for a physical exam.  This service lets us provide the care you need with a short phone conversation.  If a prescription is necessary we can send it directly to your pharmacy.  If lab work is needed we can place an order for that and you can then stop by our lab to have the test done at a later time.    Telephone visits are billed at different rates depending on your insurance coverage. During this emergency period, for some insurers they may be billed the same as an in-person visit.  Please reach out to your insurance provider with any questions.    If during the course of the call the physician/provider feels a telephone visit is not appropriate, you will not be charged for this service.\"    Patient has given verbal consent for Telephone visit?  Yes    How would you like to obtain your AVS? Sadiq Mejia     Karen Buenrostro is a 29 year old female who presents to clinic today for the following health issues:    HPI  Asthma Follow-Up    Home sick with cold today so has cough    Was ACT completed today?  No , unable to do on phone visit due to copyright laws        How many servings of fruits and vegetables do you eat daily?  0-1    On average, how many sweetened beverages do you drink each day (Examples: soda, juice, sweet tea, etc.  Do NOT count diet or artificially sweetened beverages)?   2    How many days per week do you exercise enough to make your heart beat faster? 5    How many minutes a day do you exercise enough to make your heart beat faster? 30 - 60    How many days per week do you miss taking your medication? 0     Do you have a cough?: Yes  Are you experiencing any wheezing " in your chest?: No  Do you have any shortness of breath?: Yes  Use of rescue inhaler:: a few times a month  Taking Asthma medication as prescribed:: 0  Asthma triggers:: cold air, occupational exposure, strong odors and fumes  Have you had any Emergency Room visits, Urgent Care visits, or Hospital Admissions since your last office visit?: Yes  Number of ER or Urgent Care visits for asthma : 1 time  Number of Hospital admissions for asthma : 0 times    Likes the Breo a lot better, works better with [employer]nuity. 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     3 days ago she was starting to feel run down and fatigued. Started to get worse  Yesterday had runny nose, though was her allergies. Then developed scratchy throat and then really started to hurt. When she got home she had a temp of 101. Has been taking tylenol and ibuprofen periodically which has been helping. Getting fevers and chills today. Headache, not unusual for her. This one is unusual since it's constant. Called in sick to work today.       Patient Active Problem List   Diagnosis     History of diverticulitis     Morbid obesity (H)     Mild persistent asthma without complication     Family planning     Past Surgical History:   Procedure Laterality Date     CHOLECYSTECTOMY         Social History     Tobacco Use     Smoking status: Never Smoker     Smokeless tobacco: Never Used   Substance Use Topics     Alcohol use: Yes     Comment: occ     Family History   Problem Relation Age of Onset     Family History Negative Father      Family History Negative Mother          Current Outpatient Medications   Medication Sig Dispense Refill     fluticasone-vilanterol (BREO ELLIPTA) 200-25 MCG/INH inhaler Inhale 1 puff into the lungs daily 3 Inhaler 1     levonorgest-eth estrad 91-Day (AMETHIA) 0.15-0.03 &0.01 MG tablet Take 1 tablet by mouth daily 91 tablet 3     albuterol (PROAIR HFA/PROVENTIL HFA/VENTOLIN HFA)  108 (90 Base) MCG/ACT inhaler Inhale 1-2 puffs into the lungs every 4 hours as needed for shortness of breath / dyspnea or wheezing 1 Inhaler 0       Reviewed and updated as needed this visit by Provider         Review of Systems   ROS COMP: Constitutional, HEENT, cardiovascular, pulmonary, gi and gu systems are negative, except as otherwise noted.       Objective   Reported vitals:  There were no vitals taken for this visit.   healthy, alert and no distress  PSYCH: Alert and oriented times 3; coherent speech, normal   rate and volume, able to articulate logical thoughts, able   to abstract reason, no tangential thoughts, no hallucinations   or delusions  Her affect is normal  RESP: frequent cough, no audible wheezing, able to talk in full sentences  Remainder of exam unable to be completed due to telephone visits    Diagnostic Test Results:  none         Assessment/Plan:  1. Suspected Covid-19 Virus Infection  Fatigue, followed by rhinorrhea, sore throat, cough and fever to 101. Likely COVID19. Discussed other possible diagnoses, influenza, unlikely sinus infection with no facial pain. Discussed caring for herself at home, how to self-isolate, and return to work issues. Work note given and she was referred to Indra guevara. She does have asthma, but much better control in the last month with Patricia. She will watch closely for worsening respiratory symptoms.  - COVID-19 [facility] Referral; Future    2. Mild persistent asthma without complication    - fluticasone-vilanterol (BREO ELLIPTA) 200-25 MCG/INH inhaler; Inhale 1 puff into the lungs daily  Dispense: 3 Inhaler; Refill: 1    No follow-ups on file.      Phone call duration:  30 minutes    Ashley Head MD

## 2020-05-09 ENCOUNTER — MYC MEDICAL ADVICE (OUTPATIENT)
Dept: PEDIATRICS | Facility: CLINIC | Age: 30
End: 2020-05-09

## 2020-05-09 NOTE — LETTER
[facility]  [address] [place] [address]  [phone]      May 13, 2020    Karen Buenrostro                                                                                                                                                       [address] [address]          To whom it may concern,     Karen had a virtual visit with me on 4/22/20 and I informed her not to work for for 2 weeks.    Please excuse her from 4/22/20-5/5/20.       Please call us with any questions at [phone]                            Sincerely,        Ashley Head MD

## 2020-06-21 ENCOUNTER — MYC MEDICAL ADVICE (OUTPATIENT)
Dept: PEDIATRICS | Facility: CLINIC | Age: 30
End: 2020-06-21

## 2020-07-20 ENCOUNTER — TRANSFERRED RECORDS (OUTPATIENT)
Dept: HEALTH INFORMATION MANAGEMENT | Facility: CLINIC | Age: 30
End: 2020-07-20

## 2020-10-19 DIAGNOSIS — J45.30 MILD PERSISTENT ASTHMA WITHOUT COMPLICATION: ICD-10-CM

## 2020-10-19 NOTE — LETTER
November 13, 2020      Karen Buenrostro  [address] [address]        Dear Karen,       We care about your health and have reviewed your health plan including your medical conditions, medications, and lab results.  Based on this review, it is recommended that you follow up regarding the following health topic(s):  -Asthma  -Wellness (Physical) Visit     We recommend you take the following action(s):  -schedule a FOLLOWUP APPOINTMENT.  -schedule a WELLNESS (Physical) APPOINTMENT.  We will perform the following labs: Lipids (fasting cholesterol - nothing to eat except water and/or meds for 8-10 hours), BMP (basic metabolic panel) and TSH (thyroid test).     Please call us at the [facility] - [phone] (or use [employer]) to address the above recommendations.     Thank you for trusting [facility] and we appreciate the opportunity to serve you.  We look forward to supporting your healthcare needs in the future.    Healthy Regards,    Your Health Care Team  [facility]

## 2020-10-20 NOTE — TELEPHONE ENCOUNTER
Routing refill request to provider for review/approval because:  Labs out of range:  ACT.   Last seen in April 2020.       Inhaled Steroids Protocol Krivbk49/19/2020 02:01 AM   Asthma control assessment score within normal limits in last 6 months Protocol Details    Recent (6 mo) or future (30 days) visit within the authorizing provider's specialty        Mady Ortiz RN Flex

## 2020-10-21 ENCOUNTER — MYC MEDICAL ADVICE (OUTPATIENT)
Dept: PEDIATRICS | Facility: CLINIC | Age: 30
End: 2020-10-21

## 2020-10-21 NOTE — TELEPHONE ENCOUNTER
I sent refill but it's time for her to come in to clinic for asthma follow up.   Ashley Head M.D.

## 2020-10-27 NOTE — TELEPHONE ENCOUNTER
CHG call to assist with scheduling asthma follow up. No answer, LVM asking to call back on CHG direct extension.    Attempt #1    Eduard Cornell at 5:36 PM on 10/27/2020  [facility] Health Guide  Phone [phone]

## 2020-11-02 NOTE — TELEPHONE ENCOUNTER
CHG call to assist with scheduling asthma follow up. No answer, LVM asking to call back on CHG direct extension.    Attempt #2    Eduard Cornell at 11:05 AM on 11/2/2020  [facility] Health Guide  Phone [phone]

## 2020-11-13 NOTE — TELEPHONE ENCOUNTER
Letter sent informing patient they are due for physical and asthma follow up.    Eduard Cornell at 12:48 PM on 11/13/2020  [facility] Health Guide  Phone [phone]

## 2020-11-17 ENCOUNTER — OFFICE VISIT (OUTPATIENT)
Dept: PEDIATRICS | Facility: CLINIC | Age: 30
End: 2020-11-17
Payer: COMMERCIAL

## 2020-11-17 VITALS
HEART RATE: 78 BPM | OXYGEN SATURATION: 97 % | RESPIRATION RATE: 12 BRPM | WEIGHT: 293 LBS | SYSTOLIC BLOOD PRESSURE: 110 MMHG | BODY MASS INDEX: 46.91 KG/M2 | TEMPERATURE: 99.1 F | DIASTOLIC BLOOD PRESSURE: 86 MMHG

## 2020-11-17 DIAGNOSIS — Z23 NEED FOR 23-POLYVALENT PNEUMOCOCCAL POLYSACCHARIDE VACCINE: ICD-10-CM

## 2020-11-17 DIAGNOSIS — Z23 NEED FOR PROPHYLACTIC VACCINATION AND INOCULATION AGAINST INFLUENZA: ICD-10-CM

## 2020-11-17 DIAGNOSIS — J45.30 MILD PERSISTENT ASTHMA WITHOUT COMPLICATION: Primary | ICD-10-CM

## 2020-11-17 PROCEDURE — 90471 IMMUNIZATION ADMIN: CPT | Performed by: PHYSICIAN ASSISTANT

## 2020-11-17 PROCEDURE — 90686 IIV4 VACC NO PRSV 0.5 ML IM: CPT | Performed by: PHYSICIAN ASSISTANT

## 2020-11-17 PROCEDURE — 90732 PPSV23 VACC 2 YRS+ SUBQ/IM: CPT | Performed by: PHYSICIAN ASSISTANT

## 2020-11-17 PROCEDURE — 99213 OFFICE O/P EST LOW 20 MIN: CPT | Mod: 25 | Performed by: PHYSICIAN ASSISTANT

## 2020-11-17 PROCEDURE — 90472 IMMUNIZATION ADMIN EACH ADD: CPT | Performed by: PHYSICIAN ASSISTANT

## 2020-11-17 NOTE — PROGRESS NOTES
Subjective     Karen Buenrostro is a 30 year old female who presents to clinic today for the following health issues:    History of Present Illness     Asthma:  She presents for follow up of asthma.  She has some cough, some wheezing, and some shortness of breath. She is using a relief medication a few times a month. She does not miss any doses of her controller medication throughout the week.Patient is aware of the following triggers: cold air, exercise or sports and strong odors and fumes. The patient has had a visit to the Emergency Room, Urgent Care or Hospital due to asthma since the last clinic visit. She has been to the Emergency Room or Urgent Care 1 time.She has had a Hospitalization 1 time.    She eats 0-1 servings of fruits and vegetables daily.She consumes 1 sweetened beverage(s) daily.She exercises with enough effort to increase her heart rate 30 to 60 minutes per day.  She exercises with enough effort to increase her heart rate 4 days per week.   She is taking medications regularly.       Asthma Follow-Up    Was ACT completed today?    Yes    ACT Total Scores 11/17/2020   ACT TOTAL SCORE (Goal Greater than or Equal to 20) 19   In the past 12 months, how many times did you visit the emergency room for your asthma without being admitted to the hospital? 1   In the past 12 months, how many times were you hospitalized overnight because of your asthma? 0         How many days per week do you miss taking your asthma controller medication?  0    Please describe any recent triggers for your asthma: cold air    Have you had any Emergency Room Visits, Urgent Care Visits, or Hospital Admissions since your last office visit?  No    Review of Systems   Constitutional, HEENT, cardiovascular, pulmonary, gi and gu systems are negative, except as otherwise noted.      Objective    /86 (BP Location: Right arm, Patient Position: Sitting, Cuff Size: Adult Regular)   Pulse 78   Temp 99.1  F (37.3  C) (Tympanic)    Resp 12   Wt 139.9 kg (308 lb 8 oz)   SpO2 97%   BMI 46.91 kg/m    Body mass index is 46.91 kg/m .  Physical Exam   GENERAL: healthy, alert and no distress  EYES: Eyes grossly normal to inspection, PERRL and conjunctivae and sclerae normal  NECK: no adenopathy  RESP: lungs clear to auscultation - no rales, rhonchi or wheezes  CV: regular rate and rhythm, normal S1 S2, no S3 or S4    No results found for this or any previous visit (from the past 24 hour(s)).        Assessment & Plan   (J45.30) Mild persistent asthma without complication  (primary encounter diagnosis)  Comment: stable. Refills given. Vaccines updated.   Plan: fluticasone-vilanterol (BREO ELLIPTA) 200-25         MCG/INH inhaler, PPSV23, IM/SUBQ (2+ YRS) -         [id number]          (Z23) Need for prophylactic vaccination and inoculation against influenza  Comment:   Plan: INFLUENZA VACCINE IM > 6 MONTHS VALENT IIV4         [86779]            (Z23) Need for 23-polyvalent pneumococcal polysaccharide vaccine  Comment:   Plan: PPSV23, IM/SUBQ (2+ YRS) - [id number]          [facility]

## 2020-11-18 ASSESSMENT — ASTHMA QUESTIONNAIRES: ACT_TOTALSCORE: 19

## 2020-12-27 ENCOUNTER — HEALTH MAINTENANCE LETTER (OUTPATIENT)
Age: 30
End: 2020-12-27

## 2021-03-24 ENCOUNTER — OFFICE VISIT (OUTPATIENT)
Dept: PEDIATRICS | Facility: CLINIC | Age: 31
End: 2021-03-24
Payer: COMMERCIAL

## 2021-03-24 VITALS
HEIGHT: 68 IN | TEMPERATURE: 97 F | HEART RATE: 98 BPM | DIASTOLIC BLOOD PRESSURE: 79 MMHG | OXYGEN SATURATION: 97 % | BODY MASS INDEX: 44.41 KG/M2 | SYSTOLIC BLOOD PRESSURE: 131 MMHG | RESPIRATION RATE: 20 BRPM | WEIGHT: 293 LBS

## 2021-03-24 DIAGNOSIS — R05.9 COUGH: ICD-10-CM

## 2021-03-24 DIAGNOSIS — J45.31 MILD PERSISTENT ASTHMA WITH EXACERBATION: Primary | ICD-10-CM

## 2021-03-24 PROCEDURE — 87635 SARS-COV-2 COVID-19 AMP PRB: CPT | Performed by: INTERNAL MEDICINE

## 2021-03-24 PROCEDURE — 99213 OFFICE O/P EST LOW 20 MIN: CPT | Performed by: INTERNAL MEDICINE

## 2021-03-24 RX ORDER — PREDNISONE 20 MG/1
40 TABLET ORAL DAILY
Qty: 10 TABLET | Refills: 0 | Status: SHIPPED | OUTPATIENT
Start: 2021-03-24 | End: 2022-08-22

## 2021-03-24 RX ORDER — AZITHROMYCIN 250 MG/1
TABLET, FILM COATED ORAL
Qty: 6 TABLET | Refills: 0 | Status: SHIPPED | OUTPATIENT
Start: 2021-03-24 | End: 2021-03-29

## 2021-03-24 RX ORDER — INHALER, ASSIST DEVICES
SPACER (EA) MISCELLANEOUS
Qty: 1 EACH | Refills: 0 | Status: SHIPPED | OUTPATIENT
Start: 2021-03-24 | End: 2022-08-22

## 2021-03-24 ASSESSMENT — ENCOUNTER SYMPTOMS
DIZZINESS: 1
HEMATURIA: 0
FEVER: 0
CHILLS: 0
BREAST MASS: 0
SORE THROAT: 0
FREQUENCY: 0
WEAKNESS: 1
NERVOUS/ANXIOUS: 1
ABDOMINAL PAIN: 0
NAUSEA: 0
DYSURIA: 0
EYE PAIN: 0
CONSTIPATION: 0
JOINT SWELLING: 0
HEARTBURN: 0
HEADACHES: 1
ARTHRALGIAS: 1
PARESTHESIAS: 1
SHORTNESS OF BREATH: 1
PALPITATIONS: 0
COUGH: 1
HEMATOCHEZIA: 0
MYALGIAS: 1
DIARRHEA: 0

## 2021-03-24 ASSESSMENT — MIFFLIN-ST. JEOR: SCORE: 2177.01

## 2021-03-24 NOTE — PROGRESS NOTES
SUBJECTIVE:   CC: Karen Buenrostro is an 30 year old woman who presents for office visit. Initially scheduled for preventive, but having acute respiratory symptoms at this time. Defer preventive.      Patient has been advised of split billing requirements and indicates understanding: Yes  Healthy Habits:     Getting at least 3 servings of Calcium per day:  NO    Bi-annual eye exam:  Yes    Dental care twice a year:  Yes    Sleep apnea or symptoms of sleep apnea:  Daytime drowsiness    Diet:  Breakfast skipped    Frequency of exercise:  2-3 days/week    Duration of exercise:  15-30 minutes    Taking medications regularly:  Yes    Medication side effects:  Not applicable    PHQ-2 Total Score: 0    Additional concerns today:  No          Asthma Follow-Up    Currently having flare in symptoms. URI sxs with cough, ears feel plugged. Taking breo.    Was ACT completed today?    Yes    ACT Total Scores 11/17/2020   ACT TOTAL SCORE (Goal Greater than or Equal to 20) 19   In the past 12 months, how many times did you visit the emergency room for your asthma without being admitted to the hospital? 1   In the past 12 months, how many times were you hospitalized overnight because of your asthma? 0          How many days per week do you miss taking your asthma controller medication?  0    Please describe any recent triggers for your asthma: cold, cold air     Have you had any Emergency Room Visits, Urgent Care Visits, or Hospital Admissions since your last office visit?  No      Today's PHQ-2 Score:   PHQ-2 ( 1999 Pfizer) 3/24/2021   Q1: Little interest or pleasure in doing things 0   Q2: Feeling down, depressed or hopeless 0   PHQ-2 Score 0   Q1: Little interest or pleasure in doing things Not at all   Q2: Feeling down, depressed or hopeless Not at all   PHQ-2 Score 0       Social History     Tobacco Use     Smoking status: Never Smoker     Smokeless tobacco: Never Used   Substance Use Topics     Alcohol use: Yes      "Comment: occ         Alcohol Use 3/24/2021   Prescreen: >3 drinks/day or >7 drinks/week? No   Prescreen: >3 drinks/day or >7 drinks/week? -       Reviewed and updated as needed this visit by clinical staff  Tobacco  Allergies    Med Hx  Surg Hx  Fam Hx  Soc Hx        Reviewed and updated as needed this visit by Provider                    Review of Systems   Constitutional: Negative for chills and fever.   HENT: Positive for ear pain. Negative for congestion, hearing loss and sore throat.    Eyes: Negative for pain and visual disturbance.   Respiratory: Positive for cough and shortness of breath.    Cardiovascular: Positive for chest pain. Negative for palpitations and peripheral edema.   Gastrointestinal: Negative for abdominal pain, constipation, diarrhea, heartburn, hematochezia and nausea.   Breasts:  Negative for tenderness, breast mass and discharge.   Genitourinary: Positive for genital sores. Negative for dysuria, frequency, hematuria, pelvic pain, urgency, vaginal bleeding and vaginal discharge.   Musculoskeletal: Positive for arthralgias and myalgias. Negative for joint swelling.   Skin: Negative for rash.   Neurological: Positive for dizziness, weakness, headaches and paresthesias.   Psychiatric/Behavioral: Positive for mood changes. The patient is nervous/anxious.      Error above regarding genital sores, patient did not mean to type genital sores, no genital sores.     OBJECTIVE:   /79 (BP Location: Right arm, Patient Position: Sitting, Cuff Size: Adult Large)   Pulse 98   Temp 97  F (36.1  C) (Temporal)   Resp 20   Ht 1.715 m (5' 7.52\")   Wt 141.6 kg (312 lb 3.2 oz)   LMP 03/01/2021   SpO2 97%   BMI 48.15 kg/m    Physical Exam  GENERAL: healthy, alert and no distress  EYES: Eyes grossly normal to inspection, PERRL and conjunctivae and sclerae normal  NECK: no adenopathy, no asymmetry, masses, or scars and thyroid normal to palpation  RESP: lungs clear to auscultation - no rales, " rhonchi or wheezes  CV: regular rate and rhythm, normal S1 S2, no S3 or S4, no murmur, click or rub    Diagnostic Test Results:  Labs reviewed in Epic  Results for orders placed or performed in visit on 03/24/21   Symptomatic COVID-19 Virus (Coronavirus) by PCR     Status: None    Specimen: Nasopharyngeal   Result Value Ref Range    COVID-19 Virus PCR to [facility] - Source Nasopharyngeal     COVID-19 Virus PCR to [facility] - Result       Test received-See reflex to IDDL test SARS CoV2 (COVID-19) Virus RT-PCR   SARS-CoV-2 COVID-19 Virus (Coronavirus) by PCR     Status: None    Specimen: Nasopharyngeal   Result Value Ref Range    SARS-CoV-2 Virus Specimen Source Nasopharyngeal     SARS-CoV-2 PCR Result NEGATIVE     SARS-CoV-2 PCR Comment (Note)        ASSESSMENT/PLAN:   1. Mild persistent asthma with exacerbation  On preventive dry powder inhalers, still having flare. Discussed possibility dry powder inhalers not getting best effectiveness for her. Switch to MDI, along with prednisone and zithromax course. Follow up at preventive visit. Discussed inhaler technique.  - mometasone-formoterol (DULERA) 200-5 MCG/ACT inhaler; Inhale 2 puffs into the lungs 2 times daily  Dispense: 13 g; Refill: 3  - Spacer/Aero-Holding Chambers (VORTEX VALVED HOLDING CHAMBER) HANNAH; Use with MDI  Dispense: 1 each; Refill: 0  - predniSONE (DELTASONE) 20 MG tablet; Take 2 tablets (40 mg) by mouth daily  Dispense: 10 tablet; Refill: 0  - azithromycin (ZITHROMAX) 250 MG tablet; Take 2 tablets (500 mg) by mouth daily for 1 day, THEN 1 tablet (250 mg) daily for 4 days.  Dispense: 6 tablet; Refill: 0    2. Cough    - Symptomatic COVID-19 Virus (Coronavirus) by PCR; Future  - Symptomatic COVID-19 Virus (Coronavirus) by PCR  - SARS-CoV-2 COVID-19 Virus (Coronavirus) by PCR      She reports that she has never smoked. She has never used smokeless tobacco.      Ashley Head MD  [facility]

## 2021-03-24 NOTE — PATIENT INSTRUCTIONS
Stop breo    Start dulera 2 puffs twice daily using spacer.  Take albuterol 4 times a day for the next 4 days, then as needed.    Take prednisone and zithromax for 5 days.

## 2021-03-24 NOTE — LETTER
[facility]  [address]  [address]  [place] [address]  Phone: [phone]  Fax: [phone]    March 24, 2021        Karen Buenrostro  [address] [address]          To whom it may concern:    RE: Karen Buenrostro    Patient was seen and treated today at our clinic. She was tested for COVID-19 and should remain home until her result returns.    Please contact me for questions or concerns.      Sincerely,        Ashley Head MD

## 2021-03-25 LAB
LABORATORY COMMENT REPORT: NORMAL
SARS-COV-2 RNA RESP QL NAA+PROBE: NEGATIVE
SARS-COV-2 RNA RESP QL NAA+PROBE: NORMAL
SPECIMEN SOURCE: NORMAL
SPECIMEN SOURCE: NORMAL

## 2021-03-25 ASSESSMENT — ASTHMA QUESTIONNAIRES: ACT_TOTALSCORE: 13

## 2021-04-26 ENCOUNTER — TELEPHONE (OUTPATIENT)
Dept: PEDIATRICS | Facility: CLINIC | Age: 31
End: 2021-04-26
Payer: COMMERCIAL

## 2021-05-19 ENCOUNTER — TELEPHONE (OUTPATIENT)
Dept: PEDIATRICS | Facility: CLINIC | Age: 31
End: 2021-05-19

## 2021-05-19 DIAGNOSIS — J45.30 MILD PERSISTENT ASTHMA WITHOUT COMPLICATION: Primary | ICD-10-CM

## 2021-05-19 NOTE — LETTER
My Asthma Action Plan    Name: Karen Buenrostro   YOB: 1990  Date: 5/19/2021   My doctor: Ashley Head MD   My clinic: [facility]        My Control Medicine: Mometasone furoate + formoterol (Dulera) -  200/5 mcg 2 puffs twice daily  My Rescue Medicine: Albuterol (Proair/Ventolin/Proventil HFA) 2-4 puffs EVERY 4 HOURS as needed. Use a spacer if recommended by your provider.   My Asthma Severity:   Mild Persistent  Know your asthma triggers:   cold, cold air             GREEN ZONE   Good Control    I feel good    No cough or wheeze    Can work, sleep and play without asthma symptoms       Take your asthma control medicine every day.     1. If exercise triggers your asthma, take your rescue medication    15 minutes before exercise or sports, and    During exercise if you have asthma symptoms  2. Spacer to use with inhaler: If you have a spacer, make sure to use it with your inhaler             YELLOW ZONE Getting Worse  I have ANY of these:    I do not feel good    Cough or wheeze    Chest feels tight    Wake up at night   1. Keep taking your Green Zone medications  2. Start taking your rescue medicine:    every 20 minutes for up to 1 hour. Then every 4 hours for 24-48 hours.  3. If you stay in the Yellow Zone for more than 12-24 hours, contact your doctor.  4. If you do not return to the Green Zone in 12-24 hours or you get worse, start taking your oral steroid medicine if prescribed by your provider.           RED ZONE Medical Alert - Get Help  I have ANY of these:    I feel awful    Medicine is not helping    Breathing getting harder    Trouble walking or talking    Nose opens wide to breathe       1. Take your rescue medicine NOW  2. If your provider has prescribed an oral steroid medicine, start taking it NOW  3. Call your doctor NOW  4. If you are still in the Red Zone after 20 minutes and you have not reached your doctor:    Take your rescue medicine again and    Call  911 or go to the emergency room right away    See your regular doctor within 2 weeks of an Emergency Room or Urgent Care visit for follow-up treatment.          Annual Reminders:  Meet with Asthma Educator,  Flu Shot in the Fall, consider Pneumonia Vaccination for patients with asthma (aged 19 and older).    Pharmacy:    [id number] [place], [place] - [address]  [id number] [place], [place] - [address]  [id number] [place], [place] - [address] [facility] [id number] [place], [place] - [address]    Electronically signed by Ashley Head MD   Date: 05/19/21                      Asthma Triggers  How To Control Things That Make Your Asthma Worse    Triggers are things that make your asthma worse.  Look at the list below to help you find your triggers and what you can do about them.  You can help prevent asthma flare-ups by staying away from your triggers.      Trigger                                                          What you can do   Cigarette Smoke  Tobacco smoke can make asthma worse. Do not allow smoking in your home, car or around you.  Be sure no one smokes at a child s day care or school.  If you smoke, ask your health care provider for ways to help you quit.  Ask family members to quit too.  Ask your health care provider for a referral to Quit Plan to help you quit smoking, or call 9-659-335-PLAN.     Colds, Flu, Bronchitis  These are common triggers of asthma. Wash your hands often.  Don t touch your eyes, nose or mouth.  Get a flu shot every year.     Dust Mites  These are tiny bugs that live in cloth or carpet. They are too small to see. Wash sheets and blankets in hot water every week.   Encase pillows and mattress in dust mite proof covers.  Avoid having carpet if you can. If you have carpet, vacuum weekly.   Use a dust mask and HEPA vacuum.   Pollen and Outdoor Mold  Some people are  allergic to trees, grass, or weed pollen, or molds. Try to keep your windows closed.  Limit time out doors when pollen count is high.   Ask you health care provider about taking medicine during allergy season.     Animal Dander  Some people are allergic to skin flakes, urine or saliva from pets with fur or feathers. Keep pets with fur or feathers out of your home.    If you can t keep the pet outdoors, then keep the pet out of your bedroom.  Keep the bedroom door closed.  Keep pets off cloth furniture and away from stuffed toys.     Mice, Rats, and Cockroaches   Some people are allergic to the waste from these pests.   Cover food and garbage.  Clean up spills and food crumbs.  Store grease in the refrigerator.   Keep food out of the bedroom.   Indoor Mold  This can be a trigger if your home has high moisture. Fix leaking faucets, pipes, or other sources of water.   Clean moldy surfaces.  Dehumidify basement if it is damp and smelly.   Smoke, Strong Odors, and Sprays  These can reduce air quality. Stay away from strong odors and sprays, such as perfume, powder, hair spray, paints, smoke incense, paint, cleaning products, candles and new carpet.   Exercise or Sports  Some people with asthma have this trigger. Be active!  Ask your doctor about taking medicine before sports or exercise to prevent symptoms.    Warm up for 5-10 minutes before and after sports or exercise.     Other Triggers of Asthma  Cold air:  Cover your nose and mouth with a scarf.  Sometimes laughing or crying can be a trigger.  Some medicines and food can trigger asthma.

## 2021-05-19 NOTE — TELEPHONE ENCOUNTER
Patient Quality Outreach      Summary:    Patient has the following on her problem list/HM:     Asthma review       ACT Total Scores 3/24/2021   ACT TOTAL SCORE (Goal Greater than or Equal to 20) 13   In the past 12 months, how many times did you visit the emergency room for your asthma without being admitted to the hospital? 0   In the past 12 months, how many times were you hospitalized overnight because of your asthma? 0           Patient is due/failing the following:   ACT needed and AAP, Cervical Cancer Screening - PAP Needed and Adult/Adolescent physical, date due: November 2019    Type of outreach:    Sent Agrivida message.    Questions for provider review:    None                                                                                                                                     PARESH CROOK MA on 5/19/2021 at 10:01 AM

## 2021-06-23 ENCOUNTER — TELEPHONE (OUTPATIENT)
Dept: PEDIATRICS | Facility: CLINIC | Age: 31
End: 2021-06-23

## 2021-06-23 NOTE — TELEPHONE ENCOUNTER
Prior Authorization Retail Medication Request    Medication/Dose: mometasone-formoterol (DULERA) 200-5 MCG/ACT inhaler 2 puffs b.i.d.  ICD code (if different than what is on RX):    Previously Tried and Failed:  fluticasone-vilanterol (BREO ELLIPTA) 200-25 MCG/INH inhaler; fluticasone (ARNUITY ELLIPTA) 100 MCG/ACT inhaler  Rationale:      Insurance Name:  BCMAN[employer]EP [facility]  Insurance ID:  [id number]      Pharmacy Information (if different than what is on RX)  Name:    Phone:      PARESH CROOK MA on 6/23/2021 at 1:15 PM

## 2021-06-24 NOTE — TELEPHONE ENCOUNTER
Prior Authorization Not Needed per Insurance    Medication: mometasone-formoterol (DULERA) 200-5 MCG/ACT inhaler--NO PA NEEDED  Insurance Company: [employer] - Phone [phone] Fax [phone]  Expected CoPay:      Pharmacy Filling the Rx: [facility] [address]   Pharmacy Notified: Yes  Patient Notified: Yes **Instructed pharmacy to notify patient when script is ready to /ship.**    Per pharmacy it does go through insurance however medication is over $300

## 2021-07-27 DIAGNOSIS — J45.31 MILD PERSISTENT ASTHMA WITH EXACERBATION: ICD-10-CM

## 2021-07-28 RX ORDER — MOMETASONE FUROATE AND FORMOTEROL FUMARATE DIHYDRATE 200; 5 UG/1; UG/1
AEROSOL RESPIRATORY (INHALATION)
Qty: 29 G | Refills: 0 | Status: SHIPPED | OUTPATIENT
Start: 2021-07-28 | End: 2021-10-12

## 2021-07-28 NOTE — TELEPHONE ENCOUNTER
Please call her. I did send refill. Overdue for pex and follow up on asthma. Please check in on whether this new inhaler is workign better for her.   Ashley Head M.D.

## 2021-07-28 NOTE — TELEPHONE ENCOUNTER
Routing refill request to provider for review/approval because:  Labs out of range:  ACT    Andi ISNGH RN

## 2021-08-05 ENCOUNTER — MYC MEDICAL ADVICE (OUTPATIENT)
Dept: PEDIATRICS | Facility: CLINIC | Age: 31
End: 2021-08-05

## 2021-10-04 ENCOUNTER — HEALTH MAINTENANCE LETTER (OUTPATIENT)
Age: 31
End: 2021-10-04

## 2021-10-12 ENCOUNTER — MYC REFILL (OUTPATIENT)
Dept: PEDIATRICS | Facility: CLINIC | Age: 31
End: 2021-10-12

## 2021-10-12 DIAGNOSIS — J45.31 MILD PERSISTENT ASTHMA WITH EXACERBATION: ICD-10-CM

## 2021-10-13 NOTE — TELEPHONE ENCOUNTER
Routing refill request to provider for review/approval because:  Labs out of range:  ACT  Patient needs to be seen because:  past due for asthma check.  Not scheduled for another 3 months  Rose Marie Nieves RN, BSN

## 2021-10-14 RX ORDER — MOMETASONE FUROATE AND FORMOTEROL FUMARATE DIHYDRATE 200; 5 UG/1; UG/1
2 AEROSOL RESPIRATORY (INHALATION) 2 TIMES DAILY
Qty: 29 G | Refills: 3 | Status: SHIPPED | OUTPATIENT
Start: 2021-10-14 | End: 2022-08-22

## 2022-01-22 ENCOUNTER — TRANSFERRED RECORDS (OUTPATIENT)
Dept: HEALTH INFORMATION MANAGEMENT | Facility: CLINIC | Age: 32
End: 2022-01-22
Payer: COMMERCIAL

## 2022-01-23 ENCOUNTER — HEALTH MAINTENANCE LETTER (OUTPATIENT)
Age: 32
End: 2022-01-23

## 2022-01-31 DIAGNOSIS — Z30.09 FAMILY PLANNING: ICD-10-CM

## 2022-02-01 RX ORDER — LEVONORGESTREL AND ETHINYL ESTRADIOL AND ETHINYL ESTRADIOL 150-30(84)
KIT ORAL
Qty: 91 TABLET | Refills: 0 | Status: SHIPPED | OUTPATIENT
Start: 2022-02-01 | End: 2022-04-27

## 2022-02-01 NOTE — TELEPHONE ENCOUNTER
Prescription approved per [facility] Refill Protocol.  Rose Marie ZABALA RN, BSN

## 2022-02-22 ENCOUNTER — TELEPHONE (OUTPATIENT)
Dept: PEDIATRICS | Facility: CLINIC | Age: 32
End: 2022-02-22

## 2022-02-22 NOTE — TELEPHONE ENCOUNTER
Patient Quality Outreach    Patient is due for the following:   Asthma  -  ACT needed  Cervical Cancer Screening - PAP Needed  Physical  - Due after 11/20/19    NEXT STEPS:   Patient was scheduled for an appointment.    Type of outreach:    Sent Copyright Agent message.      Questions for provider review:    None     Bess Lindsey LPN  Chart routed to     Bess Lindsey LPN

## 2022-03-29 ENCOUNTER — HOSPITAL ENCOUNTER (EMERGENCY)
Facility: CLINIC | Age: 32
Discharge: HOME | End: 2022-03-29
Attending: EMERGENCY MEDICINE | Admitting: EMERGENCY MEDICINE
Payer: COMMERCIAL

## 2022-03-29 VITALS
BODY MASS INDEX: 44.41 KG/M2 | HEIGHT: 68 IN | TEMPERATURE: 97 F | SYSTOLIC BLOOD PRESSURE: 145 MMHG | HEART RATE: 105 BPM | WEIGHT: 293 LBS | OXYGEN SATURATION: 95 % | RESPIRATION RATE: 22 BRPM | DIASTOLIC BLOOD PRESSURE: 75 MMHG

## 2022-03-29 DIAGNOSIS — J06.9 UPPER RESPIRATORY TRACT INFECTION, UNSPECIFIED TYPE: ICD-10-CM

## 2022-03-29 LAB
ALBUMIN SERPL-MCNC: 2.8 G/DL (ref 3.4–5)
ALP SERPL-CCNC: 94 U/L (ref 40–150)
ALT SERPL W P-5'-P-CCNC: 14 U/L (ref 0–50)
ANION GAP SERPL CALCULATED.3IONS-SCNC: 8 MMOL/L (ref 3–14)
AST SERPL W P-5'-P-CCNC: 11 U/L (ref 0–45)
ATRIAL RATE - MUSE: 97 BPM
BASOPHILS # BLD AUTO: 0 10E3/UL (ref 0–0.2)
BASOPHILS NFR BLD AUTO: 0 %
BILIRUB SERPL-MCNC: 0.6 MG/DL (ref 0.2–1.3)
BUN SERPL-MCNC: 7 MG/DL (ref 7–30)
CALCIUM SERPL-MCNC: 9 MG/DL (ref 8.5–10.1)
CHLORIDE BLD-SCNC: 110 MMOL/L (ref 94–109)
CO2 SERPL-SCNC: 21 MMOL/L (ref 20–32)
CREAT SERPL-MCNC: 0.68 MG/DL (ref 0.52–1.04)
D DIMER PPP FEU-MCNC: 0.5 UG/ML FEU (ref 0–0.5)
DIASTOLIC BLOOD PRESSURE - MUSE: NORMAL MMHG
EOSINOPHIL # BLD AUTO: 0.2 10E3/UL (ref 0–0.7)
EOSINOPHIL NFR BLD AUTO: 2 %
ERYTHROCYTE [DISTWIDTH] IN BLOOD BY AUTOMATED COUNT: 13 % (ref 10–15)
GFR SERPL CREATININE-BSD FRML MDRD: >90 ML/MIN/1.73M2
GLUCOSE BLD-MCNC: 110 MG/DL (ref 70–99)
HCG SERPL QL: NEGATIVE
HCO3 BLDV-SCNC: 20 MMOL/L (ref 21–28)
HCT VFR BLD AUTO: 40.6 % (ref 35–47)
HGB BLD-MCNC: 13.4 G/DL (ref 11.7–15.7)
HOLD SPECIMEN: NORMAL
IMM GRANULOCYTES # BLD: 0.1 10E3/UL
IMM GRANULOCYTES NFR BLD: 1 %
INTERPRETATION ECG - MUSE: NORMAL
LACTATE BLD-SCNC: 0.7 MMOL/L
LYMPHOCYTES # BLD AUTO: 2 10E3/UL (ref 0.8–5.3)
LYMPHOCYTES NFR BLD AUTO: 18 %
MCH RBC QN AUTO: 29.3 PG (ref 26.5–33)
MCHC RBC AUTO-ENTMCNC: 33 G/DL (ref 31.5–36.5)
MCV RBC AUTO: 89 FL (ref 78–100)
MONOCYTES # BLD AUTO: 1 10E3/UL (ref 0–1.3)
MONOCYTES NFR BLD AUTO: 10 %
NEUTROPHILS # BLD AUTO: 7.5 10E3/UL (ref 1.6–8.3)
NEUTROPHILS NFR BLD AUTO: 69 %
NRBC # BLD AUTO: 0 10E3/UL
NRBC BLD AUTO-RTO: 0 /100
P AXIS - MUSE: 33 DEGREES
PCO2 BLDV: 34 MM HG (ref 40–50)
PH BLDV: 7.38 [PH] (ref 7.32–7.43)
PLATELET # BLD AUTO: 315 10E3/UL (ref 150–450)
PO2 BLDV: 37 MM HG (ref 25–47)
POTASSIUM BLD-SCNC: 3.5 MMOL/L (ref 3.4–5.3)
PR INTERVAL - MUSE: 144 MS
PROT SERPL-MCNC: 7.5 G/DL (ref 6.8–8.8)
QRS DURATION - MUSE: 82 MS
QT - MUSE: 360 MS
QTC - MUSE: 457 MS
R AXIS - MUSE: 12 DEGREES
RBC # BLD AUTO: 4.58 10E6/UL (ref 3.8–5.2)
SAO2 % BLDV: 71 % (ref 94–100)
SODIUM SERPL-SCNC: 139 MMOL/L (ref 133–144)
SYSTOLIC BLOOD PRESSURE - MUSE: NORMAL MMHG
T AXIS - MUSE: 22 DEGREES
TROPONIN I SERPL HS-MCNC: 3 NG/L
VENTRICULAR RATE- MUSE: 97 BPM
WBC # BLD AUTO: 10.8 10E3/UL (ref 4–11)

## 2022-03-29 PROCEDURE — 85379 FIBRIN DEGRADATION QUANT: CPT | Performed by: EMERGENCY MEDICINE

## 2022-03-29 PROCEDURE — 84703 CHORIONIC GONADOTROPIN ASSAY: CPT | Performed by: EMERGENCY MEDICINE

## 2022-03-29 PROCEDURE — 36415 COLL VENOUS BLD VENIPUNCTURE: CPT | Performed by: EMERGENCY MEDICINE

## 2022-03-29 PROCEDURE — 99283 EMERGENCY DEPT VISIT LOW MDM: CPT | Mod: 25

## 2022-03-29 PROCEDURE — 99284 EMERGENCY DEPT VISIT MOD MDM: CPT

## 2022-03-29 PROCEDURE — 83605 ASSAY OF LACTIC ACID: CPT

## 2022-03-29 PROCEDURE — 82803 BLOOD GASES ANY COMBINATION: CPT

## 2022-03-29 PROCEDURE — 93005 ELECTROCARDIOGRAM TRACING: CPT

## 2022-03-29 PROCEDURE — 258N000003 HC RX IP 258 OP 636: Performed by: EMERGENCY MEDICINE

## 2022-03-29 PROCEDURE — 85025 COMPLETE CBC W/AUTO DIFF WBC: CPT | Performed by: EMERGENCY MEDICINE

## 2022-03-29 PROCEDURE — 80053 COMPREHEN METABOLIC PANEL: CPT | Performed by: EMERGENCY MEDICINE

## 2022-03-29 PROCEDURE — 96360 HYDRATION IV INFUSION INIT: CPT

## 2022-03-29 PROCEDURE — 84484 ASSAY OF TROPONIN QUANT: CPT | Performed by: EMERGENCY MEDICINE

## 2022-03-29 RX ADMIN — SODIUM CHLORIDE 1000 ML: 9 INJECTION, SOLUTION INTRAVENOUS at 05:09

## 2022-03-29 ASSESSMENT — ENCOUNTER SYMPTOMS
SHORTNESS OF BREATH: 1
SORE THROAT: 1
FEVER: 0
RHINORRHEA: 1
COUGH: 1

## 2022-03-29 NOTE — ED TRIAGE NOTES
Shortness of breath and cough since Thursday. Hx. Of Asthma. Pt states she was seen at  on 3/27 and x-rays were clear. +COVID on 3/10/22

## 2022-03-29 NOTE — ED PROVIDER NOTES
"  History   Chief Complaint:  Shortness of Breath and Cough       HPI   Karen Buenrostro is a 31 year old female with history of asthma who presents with coughing for about 5 days. Patient was tested COVID positive on 3/10/22 and was feeling better since 3/17 for about a week, however she then started having coughing last Thursday on 3/24, as well as runny nose and congestion. Patient had a Chest x-ray yesterday at  with no evidence of acute cardiopulmonary disease and was prescribed Tessalon Perles for her cough. However, she states that she has been coughing so hard that disrupts her sleep and work. She also has had pain in her throat radiating to her upper chest. Otherwise, she denies any fever. She has been drinking plenty of fluid. She takes Tylenol every 4 hours. She denies allergies.     Review of Systems   Constitutional: Negative for fever.   HENT: Positive for congestion, rhinorrhea and sore throat.    Respiratory: Positive for cough and shortness of breath.    Cardiovascular: Positive for chest pain.   All other systems reviewed and are negative.    Allergies:  Hydroxyzine    Medications:  albuterol inhaler  ASHLYNA   fluticasone-vilanterol inhaler  mometasone-formoterol inhaler  predniSONE     Past Medical History:     COPD  Diverticulitis  Duodenitis determined by biopsy  Fatty liver disease, nonalcoholic  Post concussion syndrome  Asthma  Obesity    migraine    Past Surgical History:    Cholecystectomy     Family History:    Father: asthma  Sister: depression, asthma    Social History:  Presents alone.    Physical Exam     Patient Vitals for the past 24 hrs:   BP Temp Temp src Pulse Resp SpO2 Height Weight   03/29/22 0627 -- -- -- -- -- 95 % -- --   03/29/22 0625 (!) 145/75 -- -- -- -- -- -- --   03/29/22 0444 (!) 159/74 97  F (36.1  C) Temporal 105 22 97 % 1.727 m (5' 8\") 136.1 kg (300 lb)       Physical Exam  General: Appears well-developed and well-nourished.   Head: No signs of trauma. "   Mouth/Throat: Oropharynx is clear and moist.   Eyes: Conjunctivae are normal.  Neck: Normal range of motion. No nuchal rigidity. No cervical adenopathy  CV: Mild tachycardia and regular rhythm.    Resp: Effort normal and breath sounds normal. No respiratory distress.   GI: Soft. There is no tenderness.  No rebound or guarding.  Normal bowel sounds.    MSK: Normal range of motion.   Neuro: The patient is alert and oriented. Speech normal.  Skin: Skin is warm and dry. No rash noted.   Psych: normal mood and affect. behavior is normal.       Emergency Department Course   ECG  ECG obtained at 0515, ECG read at 0516  Normal sinus rhythm.  Low voltage QRS.  Cannot rule out Anterior infarct, age undetermined.   No significant change as compared to prior, dated 10/16/14.  Rate 97 bpm. NH interval 144 ms. QRS duration 82 ms. QT/QTc 360/457 ms. P-R-T axes 33 12 22.     Laboratory:  Labs Ordered and Resulted from Time of ED Arrival to Time of ED Departure   COMPREHENSIVE METABOLIC PANEL - Abnormal       Result Value    Sodium 139      Potassium 3.5      Chloride 110 (*)     Carbon Dioxide (CO2) 21      Anion Gap 8      Urea Nitrogen 7      Creatinine 0.68      Calcium 9.0      Glucose 110 (*)     Alkaline Phosphatase 94      AST 11      ALT 14      Protein Total 7.5      Albumin 2.8 (*)     Bilirubin Total 0.6      GFR Estimate >90     ISTAT GASES LACTATE VENOUS POCT - Abnormal    Lactic Acid POCT 0.7      Bicarbonate Venous POCT 20 (*)     O2 Sat, Venous POCT 71 (*)     pCO2V Venous POCT 34 (*)     pH Venous POCT 7.38      pO2 Venous POCT 37     TROPONIN I - Normal    Troponin I High Sensitivity 3     HCG QUALITATIVE PREGNANCY - Normal    hCG Serum Qualitative Negative     D DIMER QUANTITATIVE - Normal    D-Dimer Quantitative 0.50     CBC WITH PLATELETS AND DIFFERENTIAL    WBC Count 10.8      RBC Count 4.58      Hemoglobin 13.4      Hematocrit 40.6      MCV 89      MCH 29.3      MCHC 33.0      RDW 13.0      Platelet Count  315      % Neutrophils 69      % Lymphocytes 18      % Monocytes 10      % Eosinophils 2      % Basophils 0      % Immature Granulocytes 1      NRBCs per 100 WBC 0      Absolute Neutrophils 7.5      Absolute Lymphocytes 2.0      Absolute Monocytes 1.0      Absolute Eosinophils 0.2      Absolute Basophils 0.0      Absolute Immature Granulocytes 0.1      Absolute NRBCs 0.0        Emergency Department Course:     Reviewed:  I reviewed nursing notes, vitals, past medical history and Care Everywhere    Assessments:  0522 I obtained history and examined the patient as noted above.   0622 I rechecked the patient and explained findings.     Interventions:  0509 Bolus 1L IV    Disposition:  The patient was discharged to home.     Impression & Plan     Medical Decision Making:  Karen Buenrostro is a 31-year-old woman who presents due to cough and congestion.  She had been diagnosed with COVID earlier in the month and had recovered.  A few days ago, she began having cough and congestion and this has been keeping her up.  She has been seen in the clinic and had a negative chest x-ray and was given Tessalon Perles but she has continued symptoms and difficulty sleeping.  My evaluation her lung sounds were clear and she is not a respiratory distress and she is satting in the upper 90s to 100% on room air.  I did screen obtain screening blood work which was reassuring.  I did not feel that a repeat chest x-ray was necessary.  Symptoms seem consistent with a viral upper respiratory infection.  Given the large portion of the symptoms seem to be related to the congestion, I felt that optimizing her treatment may help her symptoms. And I recommended using Afrin nasal spray for 3 days, Sudafed, and Flonase.  She was recommended follow-up the primary care doctor and return for any further concerns.    Diagnosis:    ICD-10-CM    1. Upper respiratory tract infection, unspecified type  J06.9        Discharge Medications:  Discharge  Medication List as of 3/29/2022  6:25 AM          Scribe Disclosure:  I, Stefano Larson, am serving as a scribe at 5:22 AM on 3/29/2022 to document services personally performed by Julio Cesar Shaw MD based on my observations and the provider's statements to me.          Julio Cesar Shaw MD  03/30/22 0648

## 2022-03-29 NOTE — DISCHARGE INSTRUCTIONS
You can try afrin nasal spray, but only for 3 days even if you still have symptoms.  You can also consider sudafed and Flonase nasal spray to help reduce your congestion and coughing.

## 2022-03-30 ENCOUNTER — PATIENT OUTREACH (OUTPATIENT)
Dept: CARE COORDINATION | Facility: CLINIC | Age: 32
End: 2022-03-30
Payer: COMMERCIAL

## 2022-03-30 DIAGNOSIS — Z71.89 OTHER SPECIFIED COUNSELING: ICD-10-CM

## 2022-03-30 NOTE — PROGRESS NOTES
Clinic Care Coordination Contact  [facility]/Voicemail       Clinical Data: Care Coordinator Outreach  Outreach attempted x 1.  Left message on patient's voicemail with call back information and requested return call.  Plan:  Care Coordinator will try to reach patient again in 1-2 business days.    Leona Saxena  Care Transitions Assistant  [facility]

## 2022-03-31 NOTE — PROGRESS NOTES
Clinic Care Coordination Contact  [facility]/Voicemail       Clinical Data: Care Coordinator Outreach  Outreach attempted x 2.  Left message on patient's voicemail with call back information and requested return call.  Plan:Care Coordinator will do no further outreaches at this time.    Leona Saxena  Care Transitions Assistant  [facility]

## 2022-04-25 DIAGNOSIS — Z30.09 FAMILY PLANNING: ICD-10-CM

## 2022-04-26 ENCOUNTER — MYC REFILL (OUTPATIENT)
Dept: PEDIATRICS | Facility: CLINIC | Age: 32
End: 2022-04-26
Payer: COMMERCIAL

## 2022-04-26 DIAGNOSIS — Z30.09 FAMILY PLANNING: ICD-10-CM

## 2022-04-27 DIAGNOSIS — Z30.09 FAMILY PLANNING: ICD-10-CM

## 2022-04-27 RX ORDER — LEVONORGESTREL AND ETHINYL ESTRADIOL AND ETHINYL ESTRADIOL 150-30(84)
KIT ORAL
Qty: 91 TABLET | Refills: 0 | Status: SHIPPED | OUTPATIENT
Start: 2022-04-27 | End: 2022-08-22

## 2022-04-27 RX ORDER — LEVONORGESTREL / ETHINYL ESTRADIOL AND ETHINYL ESTRADIOL 150-30(84)
1 KIT ORAL DAILY
Qty: 91 TABLET | Refills: 0 | OUTPATIENT
Start: 2022-04-27

## 2022-04-27 NOTE — TELEPHONE ENCOUNTER
Patient has upcoming appointment 5/11/22. Prescription approved per [facility] Refill Protocol.  Andi SINGH RN

## 2022-04-28 RX ORDER — LEVONORGESTREL AND ETHINYL ESTRADIOL AND ETHINYL ESTRADIOL 150-30(84)
KIT ORAL
Qty: 91 TABLET | Refills: 0 | OUTPATIENT
Start: 2022-04-28

## 2022-04-28 NOTE — TELEPHONE ENCOUNTER
Duplicate request. Prescription refilled yesterday. Refusing refill request and closing encounter.     Marisol Haywood RN on 4/28/2022 at 10:12 AM

## 2022-08-21 SDOH — ECONOMIC STABILITY: TRANSPORTATION INSECURITY
IN THE PAST 12 MONTHS, HAS THE LACK OF TRANSPORTATION KEPT YOU FROM MEDICAL APPOINTMENTS OR FROM GETTING MEDICATIONS?: NO

## 2022-08-21 SDOH — ECONOMIC STABILITY: INCOME INSECURITY: HOW HARD IS IT FOR YOU TO PAY FOR THE VERY BASICS LIKE FOOD, HOUSING, MEDICAL CARE, AND HEATING?: NOT HARD AT ALL

## 2022-08-21 SDOH — ECONOMIC STABILITY: INCOME INSECURITY: IN THE LAST 12 MONTHS, WAS THERE A TIME WHEN YOU WERE NOT ABLE TO PAY THE MORTGAGE OR RENT ON TIME?: NO

## 2022-08-21 SDOH — HEALTH STABILITY: PHYSICAL HEALTH: ON AVERAGE, HOW MANY MINUTES DO YOU ENGAGE IN EXERCISE AT THIS LEVEL?: 60 MIN

## 2022-08-21 SDOH — HEALTH STABILITY: PHYSICAL HEALTH: ON AVERAGE, HOW MANY DAYS PER WEEK DO YOU ENGAGE IN MODERATE TO STRENUOUS EXERCISE (LIKE A BRISK WALK)?: 3 DAYS

## 2022-08-21 SDOH — ECONOMIC STABILITY: FOOD INSECURITY: WITHIN THE PAST 12 MONTHS, THE FOOD YOU BOUGHT JUST DIDN'T LAST AND YOU DIDN'T HAVE MONEY TO GET MORE.: NEVER TRUE

## 2022-08-21 SDOH — ECONOMIC STABILITY: TRANSPORTATION INSECURITY
IN THE PAST 12 MONTHS, HAS LACK OF TRANSPORTATION KEPT YOU FROM MEETINGS, WORK, OR FROM GETTING THINGS NEEDED FOR DAILY LIVING?: NO

## 2022-08-21 SDOH — ECONOMIC STABILITY: FOOD INSECURITY: WITHIN THE PAST 12 MONTHS, YOU WORRIED THAT YOUR FOOD WOULD RUN OUT BEFORE YOU GOT MONEY TO BUY MORE.: NEVER TRUE

## 2022-08-21 ASSESSMENT — ENCOUNTER SYMPTOMS
DIARRHEA: 1
COUGH: 0
PALPITATIONS: 0
HEMATOCHEZIA: 0
CONSTIPATION: 0
SHORTNESS OF BREATH: 0
NAUSEA: 1
HEADACHES: 1
PARESTHESIAS: 0
DYSURIA: 0
MYALGIAS: 1
NERVOUS/ANXIOUS: 0
BREAST MASS: 0
SORE THROAT: 0
WEAKNESS: 1
EYE PAIN: 0
DIZZINESS: 0
ABDOMINAL PAIN: 0
FEVER: 0
CHILLS: 0
HEMATURIA: 0
HEARTBURN: 0
FREQUENCY: 0
JOINT SWELLING: 0
ARTHRALGIAS: 1

## 2022-08-21 ASSESSMENT — ASTHMA QUESTIONNAIRES
QUESTION_2 LAST FOUR WEEKS HOW OFTEN HAVE YOU HAD SHORTNESS OF BREATH: NOT AT ALL
QUESTION_3 LAST FOUR WEEKS HOW OFTEN DID YOUR ASTHMA SYMPTOMS (WHEEZING, COUGHING, SHORTNESS OF BREATH, CHEST TIGHTNESS OR PAIN) WAKE YOU UP AT NIGHT OR EARLIER THAN USUAL IN THE MORNING: NOT AT ALL
ACT_TOTALSCORE: 25
QUESTION_1 LAST FOUR WEEKS HOW MUCH OF THE TIME DID YOUR ASTHMA KEEP YOU FROM GETTING AS MUCH DONE AT WORK, SCHOOL OR AT HOME: NONE OF THE TIME
ACT_TOTALSCORE: 25
QUESTION_4 LAST FOUR WEEKS HOW OFTEN HAVE YOU USED YOUR RESCUE INHALER OR NEBULIZER MEDICATION (SUCH AS ALBUTEROL): NOT AT ALL
QUESTION_5 LAST FOUR WEEKS HOW WOULD YOU RATE YOUR ASTHMA CONTROL: COMPLETELY CONTROLLED

## 2022-08-21 ASSESSMENT — LIFESTYLE VARIABLES
SKIP TO QUESTIONS 9-10: 1
HOW OFTEN DO YOU HAVE SIX OR MORE DRINKS ON ONE OCCASION: NEVER
HOW OFTEN DO YOU HAVE A DRINK CONTAINING ALCOHOL: MONTHLY OR LESS
AUDIT-C TOTAL SCORE: 1
HOW MANY STANDARD DRINKS CONTAINING ALCOHOL DO YOU HAVE ON A TYPICAL DAY: 1 OR 2

## 2022-08-21 ASSESSMENT — SOCIAL DETERMINANTS OF HEALTH (SDOH)
DO YOU BELONG TO ANY CLUBS OR ORGANIZATIONS SUCH AS CHURCH GROUPS UNIONS, FRATERNAL OR ATHLETIC GROUPS, OR SCHOOL GROUPS?: NO
ARE YOU MARRIED, WIDOWED, DIVORCED, SEPARATED, NEVER MARRIED, OR LIVING WITH A PARTNER?: LIVING WITH PARTNER
IN A TYPICAL WEEK, HOW MANY TIMES DO YOU TALK ON THE PHONE WITH FAMILY, FRIENDS, OR NEIGHBORS?: NEVER
HOW OFTEN DO YOU GET TOGETHER WITH FRIENDS OR RELATIVES?: NEVER
HOW OFTEN DO YOU ATTEND CHURCH OR RELIGIOUS SERVICES?: NEVER

## 2022-08-22 ENCOUNTER — OFFICE VISIT (OUTPATIENT)
Dept: PEDIATRICS | Facility: CLINIC | Age: 32
End: 2022-08-22
Payer: COMMERCIAL

## 2022-08-22 VITALS
BODY MASS INDEX: 44.41 KG/M2 | HEIGHT: 68 IN | DIASTOLIC BLOOD PRESSURE: 88 MMHG | SYSTOLIC BLOOD PRESSURE: 130 MMHG | TEMPERATURE: 97.6 F | RESPIRATION RATE: 20 BRPM | WEIGHT: 293 LBS | HEART RATE: 75 BPM | OXYGEN SATURATION: 96 %

## 2022-08-22 DIAGNOSIS — Z12.4 CERVICAL CANCER SCREENING: ICD-10-CM

## 2022-08-22 DIAGNOSIS — Z30.09 FAMILY PLANNING: ICD-10-CM

## 2022-08-22 DIAGNOSIS — Z00.00 ROUTINE GENERAL MEDICAL EXAMINATION AT A HEALTH CARE FACILITY: Primary | ICD-10-CM

## 2022-08-22 DIAGNOSIS — J45.30 MILD PERSISTENT ASTHMA WITHOUT COMPLICATION: ICD-10-CM

## 2022-08-22 PROCEDURE — 87624 HPV HI-RISK TYP POOLED RSLT: CPT | Performed by: STUDENT IN AN ORGANIZED HEALTH CARE EDUCATION/TRAINING PROGRAM

## 2022-08-22 PROCEDURE — 99213 OFFICE O/P EST LOW 20 MIN: CPT | Mod: 25 | Performed by: STUDENT IN AN ORGANIZED HEALTH CARE EDUCATION/TRAINING PROGRAM

## 2022-08-22 PROCEDURE — G0145 SCR C/V CYTO,THINLAYER,RESCR: HCPCS | Performed by: STUDENT IN AN ORGANIZED HEALTH CARE EDUCATION/TRAINING PROGRAM

## 2022-08-22 PROCEDURE — 99395 PREV VISIT EST AGE 18-39: CPT | Performed by: STUDENT IN AN ORGANIZED HEALTH CARE EDUCATION/TRAINING PROGRAM

## 2022-08-22 RX ORDER — FLUTICASONE PROPIONATE AND SALMETEROL XINAFOATE 230; 21 UG/1; UG/1
2 AEROSOL, METERED RESPIRATORY (INHALATION) 2 TIMES DAILY
Qty: 12 G | Refills: 3 | Status: SHIPPED | OUTPATIENT
Start: 2022-08-22

## 2022-08-22 RX ORDER — LEVONORGESTREL / ETHINYL ESTRADIOL AND ETHINYL ESTRADIOL 150-30(84)
1 KIT ORAL DAILY
Qty: 91 TABLET | Refills: 3 | Status: SHIPPED | OUTPATIENT
Start: 2022-08-22

## 2022-08-22 ASSESSMENT — ENCOUNTER SYMPTOMS
HEMATOCHEZIA: 0
FREQUENCY: 0
NAUSEA: 1
PARESTHESIAS: 0
COUGH: 0
HEADACHES: 1
PALPITATIONS: 0
EYE PAIN: 0
ARTHRALGIAS: 1
MYALGIAS: 1
CHILLS: 0
WEAKNESS: 1
NERVOUS/ANXIOUS: 0
DYSURIA: 0
HEARTBURN: 0
FEVER: 0
JOINT SWELLING: 0
SORE THROAT: 0
HEMATURIA: 0
ABDOMINAL PAIN: 0
DIZZINESS: 0
BREAST MASS: 0
CONSTIPATION: 0
DIARRHEA: 1
SHORTNESS OF BREATH: 0

## 2022-08-22 ASSESSMENT — PAIN SCALES - GENERAL: PAINLEVEL: NO PAIN (0)

## 2022-08-22 NOTE — PROGRESS NOTES
SUBJECTIVE:   CC: Karen Buenrostro is an 31 year old woman who presents for preventive health visit.     Patient has been advised of split billing requirements and indicates understanding: Yes  Healthy Habits:     Getting at least 3 servings of Calcium per day:  NO    Bi-annual eye exam:  Yes    Dental care twice a year:  Yes    Sleep apnea or symptoms of sleep apnea:  Daytime drowsiness    Diet:  Regular (no restrictions)    Frequency of exercise:  2-3 days/week    Duration of exercise:  30-45 minutes    Taking medications regularly:  Yes    Medication side effects:  None    PHQ-2 Total Score: 1    Additional concerns today:  No    Works in a [employer] as manager    Asthma  -switched insurances so stopped   -started allergy pill daily and has helped a lot: generic Claritin  -cold air affects it and causes cough  -summer is usually fine    Had COVID19 in March    Typically eats once per day and not at work      Today's PHQ-2 Score:   PHQ-2 ( 1999 Pfizer) 8/21/2022   Q1: Little interest or pleasure in doing things 0   Q2: Feeling down, depressed or hopeless 1   PHQ-2 Score 1   PHQ-2 Total Score (12-17 Years)- Positive if 3 or more points; Administer PHQ-A if positive -   Q1: Little interest or pleasure in doing things Not at all   Q2: Feeling down, depressed or hopeless Several days   PHQ-2 Score 1     No flowsheet data found.    Abuse: Current or Past (Physical, Sexual or Emotional) - No  Do you feel safe in your environment? Yes    Have you ever done Advance Care Planning? (For example, a Health Directive, POLST, or a discussion with a medical provider or your loved ones about your wishes): No, advance care planning information given to patient to review.  Patient declined advance care planning discussion at this time.    Social History     Tobacco Use     Smoking status: Never Smoker     Smokeless tobacco: Never Used   Substance Use Topics     Alcohol use: Yes     Comment: occ       Alcohol Use 8/21/2022    Prescreen: >3 drinks/day or >7 drinks/week? No   Prescreen: >3 drinks/day or >7 drinks/week? -       Reviewed orders with patient.  Reviewed health maintenance and updated orders accordingly - Yes    Breast Cancer Screening:    Breast CA Risk Assessment (FHS-7) 3/24/2021   Do you have a family history of breast, colon, or ovarian cancer? No / Unknown       Patient under 40 years of age: Routine Mammogram Screening not recommended.   Pertinent mammograms are reviewed under the imaging tab.    History of abnormal Pap smear: NO - age 30-65 PAP every 5 years with negative HPV co-testing recommended  PAP / HPV 9/26/2017   PAP (Historical) NIL     Reviewed and updated as needed this visit by clinical staff   Tobacco  Allergies    Med Hx  Surg Hx  Fam Hx  Soc Hx        Reviewed and updated as needed this visit by Provider                   Review of Systems   Constitutional: Negative for chills and fever.   HENT: Negative for congestion, ear pain, hearing loss and sore throat.    Eyes: Negative for pain and visual disturbance.   Respiratory: Negative for cough and shortness of breath.    Cardiovascular: Negative for chest pain, palpitations and peripheral edema.   Gastrointestinal: Positive for diarrhea and nausea. Negative for abdominal pain, constipation, heartburn and hematochezia.   Breasts:  Negative for tenderness, breast mass and discharge.   Genitourinary: Positive for pelvic pain. Negative for dysuria, frequency, genital sores, hematuria, urgency, vaginal bleeding and vaginal discharge.   Musculoskeletal: Positive for arthralgias and myalgias. Negative for joint swelling.   Skin: Negative for rash.   Neurological: Positive for weakness and headaches. Negative for dizziness and paresthesias.   Psychiatric/Behavioral: Positive for mood changes. The patient is not nervous/anxious.         OBJECTIVE:   /88 (BP Location: Right arm, Patient Position: Sitting, Cuff Size: Adult Large)   Pulse 75   Temp 97.6  " F (36.4  C) (Temporal)   Resp 20   Ht 1.727 m (5' 8\")   Wt 138.4 kg (305 lb 3.2 oz)   SpO2 96%   BMI 46.41 kg/m    Physical Exam  GENERAL: healthy, alert and no distress  EYES: Eyes grossly normal to inspection, conjunctivae and sclerae normal  HENT: ear canals and TM's normal, nose and mouth without ulcers or lesions  NECK: no adenopathy, no asymmetry, masses, or scars and thyroid normal to palpation  RESP: lungs clear to auscultation - no rales, rhonchi or wheezes  CV: regular rate and rhythm, normal S1 S2, no S3 or S4, no murmur, click or rub, no peripheral edema and peripheral pulses strong   (female): normal female external genitalia, normal urethral meatus, vaginal mucosa pink, moist, well rugated, and normal cervix/adnexa/uterus without masses or discharge  MS: no gross musculoskeletal defects noted, no edema  SKIN: no suspicious lesions or rashes  NEURO: Normal strength and tone, mentation intact and speech normal  PSYCH: mentation appears normal, affect normal/bright    ASSESSMENT/PLAN:       ICD-10-CM    1. Routine general medical examination at a health care facility  Z00.00    2. Cervical cancer screening  Z12.4 Pap Screen with HPV - recommended age 30 - 65 years   3. Mild persistent asthma without complication  J45.30 fluticasone-salmeterol (ADVAIR-HFA) 230-21 MCG/ACT inhaler   4. Family planning  Z30.09 levonorgest-eth estrad 91-Day (ASHLYNA) 0.15-0.03 &0.01 MG tablet         COUNSELING:  Reviewed preventive health counseling, as reflected in patient instructions    Estimated body mass index is 46.41 kg/m  as calculated from the following:    Height as of this encounter: 1.727 m (5' 8\").    Weight as of this encounter: 138.4 kg (305 lb 3.2 oz).      She reports that she has never smoked. She has never used smokeless tobacco.      Counseling Resources:  ATP IV Guidelines  Pooled Cohorts Equation Calculator  Breast Cancer Risk Calculator  BRCA-Related Cancer Risk Assessment: FHS-7 Tool  FRAX Risk " Assessment  ICSI Preventive Guidelines  Dietary Guidelines for Americans, 2010  USDA's MyPlate  ASA Prophylaxis  Lung CA Screening    Maryjane Sterling MD  [facility]

## 2022-08-22 NOTE — PATIENT INSTRUCTIONS
So nice to meet you!    Get COVID19 booster :)    Advair HFA inhaler  -fluticasone steroid  -salmeterol (Serevent) bronchodilator similar to albuterol, but longer acting      Let me know by Foxfly[employer] message if it's still crazy expensive!    Preventive Health Recommendations  Female Ages 26 - 39  Yearly exam:   See your health care provider every year in order to  Review health changes.   Discuss preventive care.    Review your medicines if you your doctor has prescribed any.    Until age 30: Get a Pap test every three years (more often if you have had an abnormal result).    After age 30: Talk to your doctor about whether you should have a Pap test every 3 years or have a Pap test with HPV screening every 5 years.   You do not need a Pap test if your uterus was removed (hysterectomy) and you have not had cancer.  You should be tested each year for STDs (sexually transmitted diseases), if you're at risk.   Talk to your provider about how often to have your cholesterol checked.  If you are at risk for diabetes, you should have a diabetes test (fasting glucose).  Shots: Get a flu shot each year. Get a tetanus shot every 10 years.   Nutrition:   Eat at least 5 servings of fruits and vegetables each day.  Eat whole-grain bread, whole-wheat pasta and brown rice instead of white grains and rice.  Get adequate Calcium and Vitamin D.     Lifestyle  Exercise at least 150 minutes a week (30 minutes a day, 5 days of the week). This will help you control your weight and prevent disease.  Limit alcohol to one drink per day.  No smoking.   Wear sunscreen to prevent skin cancer.  See your dentist every six months for an exam and cleaning.

## 2022-08-25 LAB
BKR LAB AP GYN ADEQUACY: NORMAL
BKR LAB AP GYN INTERPRETATION: NORMAL
BKR LAB AP HPV REFLEX: NORMAL
BKR LAB AP PREVIOUS ABNORMAL: NORMAL
PATH REPORT.COMMENTS IMP SPEC: NORMAL
PATH REPORT.COMMENTS IMP SPEC: NORMAL
PATH REPORT.RELEVANT HX SPEC: NORMAL

## 2022-08-29 LAB
HUMAN PAPILLOMA VIRUS 16 DNA: NEGATIVE
HUMAN PAPILLOMA VIRUS 18 DNA: NEGATIVE
HUMAN PAPILLOMA VIRUS FINAL DIAGNOSIS: NORMAL
HUMAN PAPILLOMA VIRUS OTHER HR: NEGATIVE

## 2022-09-11 ENCOUNTER — HEALTH MAINTENANCE LETTER (OUTPATIENT)
Age: 32
End: 2022-09-11

## 2022-11-28 ENCOUNTER — TRANSFERRED RECORDS (OUTPATIENT)
Dept: HEALTH INFORMATION MANAGEMENT | Facility: CLINIC | Age: 32
End: 2022-11-28

## 2023-03-23 ENCOUNTER — TRANSFERRED RECORDS (OUTPATIENT)
Dept: HEALTH INFORMATION MANAGEMENT | Facility: CLINIC | Age: 33
End: 2023-03-23

## 2024-06-29 ENCOUNTER — TRANSFERRED RECORDS (OUTPATIENT)
Dept: HEALTH INFORMATION MANAGEMENT | Facility: CLINIC | Age: 34
End: 2024-06-29
Payer: COMMERCIAL

## 2024-08-19 ENCOUNTER — APPOINTMENT (OUTPATIENT)
Dept: CT IMAGING | Facility: CLINIC | Age: 34
End: 2024-08-19
Attending: STUDENT IN AN ORGANIZED HEALTH CARE EDUCATION/TRAINING PROGRAM
Payer: COMMERCIAL

## 2024-08-19 ENCOUNTER — HOSPITAL ENCOUNTER (EMERGENCY)
Facility: CLINIC | Age: 34
Discharge: HOME | End: 2024-08-19
Attending: STUDENT IN AN ORGANIZED HEALTH CARE EDUCATION/TRAINING PROGRAM | Admitting: STUDENT IN AN ORGANIZED HEALTH CARE EDUCATION/TRAINING PROGRAM
Payer: COMMERCIAL

## 2024-08-19 VITALS
TEMPERATURE: 98.5 F | BODY MASS INDEX: 44.41 KG/M2 | SYSTOLIC BLOOD PRESSURE: 132 MMHG | HEART RATE: 71 BPM | DIASTOLIC BLOOD PRESSURE: 89 MMHG | RESPIRATION RATE: 17 BRPM | HEIGHT: 68 IN | OXYGEN SATURATION: 96 % | WEIGHT: 293 LBS

## 2024-08-19 DIAGNOSIS — K55.069 OMENTAL INFARCTION (H): ICD-10-CM

## 2024-08-19 DIAGNOSIS — R11.2 NAUSEA VOMITING AND DIARRHEA: ICD-10-CM

## 2024-08-19 DIAGNOSIS — R19.7 NAUSEA VOMITING AND DIARRHEA: ICD-10-CM

## 2024-08-19 LAB
ALBUMIN SERPL BCG-MCNC: 4.4 G/DL (ref 3.5–5.2)
ALBUMIN UR-MCNC: ABNORMAL MG/DL
ALP SERPL-CCNC: 117 U/L (ref 40–150)
ALT SERPL W P-5'-P-CCNC: 18 U/L (ref 0–50)
ANION GAP SERPL CALCULATED.3IONS-SCNC: 16 MMOL/L (ref 7–15)
APPEARANCE UR: CLEAR
AST SERPL W P-5'-P-CCNC: 24 U/L (ref 0–45)
BASOPHILS # BLD AUTO: 0 10E3/UL (ref 0–0.2)
BASOPHILS NFR BLD AUTO: 0 %
BILIRUB SERPL-MCNC: 0.5 MG/DL
BILIRUB UR QL STRIP: NEGATIVE
BUN SERPL-MCNC: 12.8 MG/DL (ref 6–20)
CALCIUM SERPL-MCNC: 9.4 MG/DL (ref 8.8–10.4)
CHLORIDE SERPL-SCNC: 102 MMOL/L (ref 98–107)
COLOR UR AUTO: YELLOW
CREAT SERPL-MCNC: 0.85 MG/DL (ref 0.51–0.95)
EGFRCR SERPLBLD CKD-EPI 2021: >90 ML/MIN/1.73M2
EOSINOPHIL # BLD AUTO: 0.2 10E3/UL (ref 0–0.7)
EOSINOPHIL NFR BLD AUTO: 2 %
ERYTHROCYTE [DISTWIDTH] IN BLOOD BY AUTOMATED COUNT: 12.5 % (ref 10–15)
GLUCOSE SERPL-MCNC: 95 MG/DL (ref 70–99)
GLUCOSE UR STRIP-MCNC: NEGATIVE MG/DL
HCG UR QL: NEGATIVE
HCO3 SERPL-SCNC: 22 MMOL/L (ref 22–29)
HCT VFR BLD AUTO: 44.8 % (ref 35–47)
HGB BLD-MCNC: 15.1 G/DL (ref 11.7–15.7)
HGB UR QL STRIP: NEGATIVE
HOLD SPECIMEN: NORMAL
HOLD SPECIMEN: NORMAL
IMM GRANULOCYTES # BLD: 0.1 10E3/UL
IMM GRANULOCYTES NFR BLD: 1 %
KETONES UR STRIP-MCNC: NEGATIVE MG/DL
LEUKOCYTE ESTERASE UR QL STRIP: NEGATIVE
LYMPHOCYTES # BLD AUTO: 2.6 10E3/UL (ref 0.8–5.3)
LYMPHOCYTES NFR BLD AUTO: 28 %
MCH RBC QN AUTO: 29.5 PG (ref 26.5–33)
MCHC RBC AUTO-ENTMCNC: 33.7 G/DL (ref 31.5–36.5)
MCV RBC AUTO: 88 FL (ref 78–100)
MONOCYTES # BLD AUTO: 0.7 10E3/UL (ref 0–1.3)
MONOCYTES NFR BLD AUTO: 7 %
NEUTROPHILS # BLD AUTO: 5.8 10E3/UL (ref 1.6–8.3)
NEUTROPHILS NFR BLD AUTO: 62 %
NITRATE UR QL: NEGATIVE
NRBC # BLD AUTO: 0 10E3/UL
NRBC BLD AUTO-RTO: 0 /100
PH UR STRIP: 6 [PH] (ref 5–7)
PLATELET # BLD AUTO: 254 10E3/UL (ref 150–450)
POTASSIUM SERPL-SCNC: 4.7 MMOL/L (ref 3.4–5.3)
PROT SERPL-MCNC: 7.4 G/DL (ref 6.4–8.3)
RBC # BLD AUTO: 5.12 10E6/UL (ref 3.8–5.2)
SODIUM SERPL-SCNC: 140 MMOL/L (ref 135–145)
SP GR UR STRIP: 1.03 (ref 1–1.03)
UROBILINOGEN UR STRIP-MCNC: NORMAL MG/DL
WBC # BLD AUTO: 9.3 10E3/UL (ref 4–11)

## 2024-08-19 PROCEDURE — 81003 URINALYSIS AUTO W/O SCOPE: CPT | Performed by: EMERGENCY MEDICINE

## 2024-08-19 PROCEDURE — 85025 COMPLETE CBC W/AUTO DIFF WBC: CPT | Performed by: STUDENT IN AN ORGANIZED HEALTH CARE EDUCATION/TRAINING PROGRAM

## 2024-08-19 PROCEDURE — 250N000011 HC RX IP 250 OP 636: Performed by: STUDENT IN AN ORGANIZED HEALTH CARE EDUCATION/TRAINING PROGRAM

## 2024-08-19 PROCEDURE — 250N000011 HC RX IP 250 OP 636: Performed by: EMERGENCY MEDICINE

## 2024-08-19 PROCEDURE — 250N000009 HC RX 250: Performed by: STUDENT IN AN ORGANIZED HEALTH CARE EDUCATION/TRAINING PROGRAM

## 2024-08-19 PROCEDURE — 74177 CT ABD & PELVIS W/CONTRAST: CPT

## 2024-08-19 PROCEDURE — 80053 COMPREHEN METABOLIC PANEL: CPT | Performed by: EMERGENCY MEDICINE

## 2024-08-19 PROCEDURE — 81003 URINALYSIS AUTO W/O SCOPE: CPT | Performed by: STUDENT IN AN ORGANIZED HEALTH CARE EDUCATION/TRAINING PROGRAM

## 2024-08-19 PROCEDURE — 96374 THER/PROPH/DIAG INJ IV PUSH: CPT | Mod: 59

## 2024-08-19 PROCEDURE — 81025 URINE PREGNANCY TEST: CPT | Performed by: EMERGENCY MEDICINE

## 2024-08-19 PROCEDURE — 36415 COLL VENOUS BLD VENIPUNCTURE: CPT | Performed by: EMERGENCY MEDICINE

## 2024-08-19 PROCEDURE — 80053 COMPREHEN METABOLIC PANEL: CPT | Performed by: STUDENT IN AN ORGANIZED HEALTH CARE EDUCATION/TRAINING PROGRAM

## 2024-08-19 PROCEDURE — 96375 TX/PRO/DX INJ NEW DRUG ADDON: CPT

## 2024-08-19 PROCEDURE — 81025 URINE PREGNANCY TEST: CPT | Performed by: STUDENT IN AN ORGANIZED HEALTH CARE EDUCATION/TRAINING PROGRAM

## 2024-08-19 PROCEDURE — 99285 EMERGENCY DEPT VISIT HI MDM: CPT | Mod: 25

## 2024-08-19 PROCEDURE — 85004 AUTOMATED DIFF WBC COUNT: CPT | Performed by: EMERGENCY MEDICINE

## 2024-08-19 RX ORDER — IOPAMIDOL 755 MG/ML
500 INJECTION, SOLUTION INTRAVASCULAR ONCE
Status: COMPLETED | OUTPATIENT
Start: 2024-08-19 | End: 2024-08-19

## 2024-08-19 RX ORDER — HYDROMORPHONE HYDROCHLORIDE 1 MG/ML
0.5 INJECTION, SOLUTION INTRAMUSCULAR; INTRAVENOUS; SUBCUTANEOUS
Status: COMPLETED | OUTPATIENT
Start: 2024-08-19 | End: 2024-08-19

## 2024-08-19 RX ORDER — KETOROLAC TROMETHAMINE 15 MG/ML
15 INJECTION, SOLUTION INTRAMUSCULAR; INTRAVENOUS ONCE
Status: COMPLETED | OUTPATIENT
Start: 2024-08-19 | End: 2024-08-19

## 2024-08-19 RX ORDER — ONDANSETRON 2 MG/ML
4 INJECTION INTRAMUSCULAR; INTRAVENOUS ONCE
Status: COMPLETED | OUTPATIENT
Start: 2024-08-19 | End: 2024-08-19

## 2024-08-19 RX ORDER — ONDANSETRON 4 MG/1
4 TABLET, ORALLY DISINTEGRATING ORAL EVERY 8 HOURS PRN
Qty: 30 TABLET | Refills: 0 | Status: SHIPPED | OUTPATIENT
Start: 2024-08-19

## 2024-08-19 RX ADMIN — SODIUM CHLORIDE 100 ML: 9 INJECTION, SOLUTION INTRAVENOUS at 09:55

## 2024-08-19 RX ADMIN — ONDANSETRON 4 MG: 2 INJECTION INTRAMUSCULAR; INTRAVENOUS at 07:37

## 2024-08-19 RX ADMIN — IOPAMIDOL 100 ML: 755 INJECTION, SOLUTION INTRAVENOUS at 09:55

## 2024-08-19 RX ADMIN — KETOROLAC TROMETHAMINE 15 MG: 15 INJECTION, SOLUTION INTRAMUSCULAR; INTRAVENOUS at 09:20

## 2024-08-19 ASSESSMENT — ACTIVITIES OF DAILY LIVING (ADL)
ADLS_ACUITY_SCORE: 33
ADLS_ACUITY_SCORE: 35

## 2024-08-19 ASSESSMENT — COLUMBIA-SUICIDE SEVERITY RATING SCALE - C-SSRS
1. IN THE PAST MONTH, HAVE YOU WISHED YOU WERE DEAD OR WISHED YOU COULD GO TO SLEEP AND NOT WAKE UP?: NO
2. HAVE YOU ACTUALLY HAD ANY THOUGHTS OF KILLING YOURSELF IN THE PAST MONTH?: NO
6. HAVE YOU EVER DONE ANYTHING, STARTED TO DO ANYTHING, OR PREPARED TO DO ANYTHING TO END YOUR LIFE?: NO

## 2024-08-19 NOTE — DISCHARGE INSTRUCTIONS
Please begin taking ibuprofen 600 mg every 6 hours for ongoing pain.  Okay to take Tylenol between these doses.  I also prescribed an antinausea medication.  Generally with these on until infarctions, they resolve on their own and pain will resolve as they heal.    I have also sent stool studies home with you.  Please complete these at your earliest convenience and return to a [place] lab.    If pain cannot be controlled at home redevelop any worsening symptoms, please return to ER.  I also recommend you follow-up with your primary care provider in 1 week for reassessment and to ensure that symptoms are well-controlled.

## 2024-08-19 NOTE — ED TRIAGE NOTES
Pt complains of mid abd pain that radiates to R side of abd that started late Friday night. Pt complains of diarrhea and vomiting that started yesterday. Pt states she is unable to keep anything down. Of note pt states she has lesions covering liver and hx of diverticulitis.     Of note pt does not have a gallbladder anymore.

## 2024-08-19 NOTE — ED PROVIDER NOTES
"  Emergency Department Note      History of Present Illness     Chief Complaint   Abdominal Pain      HPI   Karen Buenrostro is a 33 year old female who presents with generalized abdominal pain.  Patient reports that on Friday she developed abdominal pain that initially started periumbilically but now seems to radiate to all other quadrants of her abdomen.  Endorses nausea and vomiting with severe diarrhea.  States that she is unable to keep anything down and anytime she ingest anything, she immediately has diarrhea.  Pain has been constant since Friday night and worsening.  History of diverticulitis but states that this does not feel similar.  Gallbladder removed.  No other abdominal surgeries.    Independent Historian   None    Review of External Notes   Reviewed ED note from 4/16/2024 -left upper quadrant abdominal pain, diagnosed with diverticulitis  Reviewed ED note from 2/28/2024 -left lateral abdominal pain with diagnosis of diverticulitis    Past Medical History     Medical History and Problem List   COPD  Diverticulitis  Duodenitis  Nonalcoholic fatty liver  Asthma   Obesity   Anxiety   Erosive gastritis  Migraine   C. Difficile   Cholelithiasis     Medications   Norethindrone   Sertraline     Surgical History   Cholecystectomy     Physical Exam     Patient Vitals for the past 24 hrs:   BP Temp Temp src Pulse Resp SpO2 Height Weight   08/19/24 1041 132/89 -- -- -- -- -- -- --   08/19/24 0919 -- -- -- -- -- 96 % -- --   08/19/24 0915 (!) 142/92 98.5  F (36.9  C) Oral -- -- 96 % -- --   08/19/24 0911 -- -- -- 71 -- 99 % -- --   08/19/24 0904 (!) 138/101 -- -- -- -- -- -- --   08/19/24 0734 -- 98.1  F (36.7  C) Oral -- -- -- -- --   08/19/24 0732 (!) 132/102 -- -- 77 17 97 % 1.727 m (5' 8\") 145.5 kg (320 lb 12.3 oz)     Physical Exam  General: Alert and cooperative with exam.  Appears uncomfortable due to level of pain.  Normal mentation.  Head:  Scalp is NC/AT  Eyes:  No scleral icterus, PERRL  ENT:  The " external nose and ears are normal.   Neck:  Normal range of motion without rigidity.  CV:  Regular rate and rhythm    No pathologic murmur   Resp:  Breath sounds are clear bilaterally    Non-labored, no retractions or accessory muscle use  GI:  Generalized abdominal pain to palpation with voluntary guarding.  MS:  No lower extremity edema   Skin:  Warm and dry, No rash or lesions noted.  Neuro:  Oriented x 3. No gross motor deficits.      Diagnostics     Lab Results   Labs Ordered and Resulted from Time of ED Arrival to Time of ED Departure   ROUTINE UA WITH MICROSCOPIC REFLEX TO CULTURE - Abnormal       Result Value    Color Urine Yellow      Appearance Urine Clear      Glucose Urine Negative      Bilirubin Urine Negative      Ketones Urine Negative      Specific Gravity Urine 1.030      Blood Urine Negative      pH Urine 6.0      Protein Albumin Urine Trace (*)     Urobilinogen Urine Normal      Nitrite Urine Negative      Leukocyte Esterase Urine Negative     COMPREHENSIVE METABOLIC PANEL - Abnormal    Sodium 140      Potassium 4.7      Carbon Dioxide (CO2) 22      Anion Gap 16 (*)     Urea Nitrogen 12.8      Creatinine 0.85      GFR Estimate >90      Calcium 9.4      Chloride 102      Glucose 95      Alkaline Phosphatase 117      AST 24      ALT 18      Protein Total 7.4      Albumin 4.4      Bilirubin Total 0.5     HCG QUALITATIVE URINE - Normal    hCG Urine Qualitative Negative     CBC WITH PLATELETS AND DIFFERENTIAL    WBC Count 9.3      RBC Count 5.12      Hemoglobin 15.1      Hematocrit 44.8      MCV 88      MCH 29.5      MCHC 33.7      RDW 12.5      Platelet Count 254      % Neutrophils 62      % Lymphocytes 28      % Monocytes 7      % Eosinophils 2      % Basophils 0      % Immature Granulocytes 1      NRBCs per 100 WBC 0      Absolute Neutrophils 5.8      Absolute Lymphocytes 2.6      Absolute Monocytes 0.7      Absolute Eosinophils 0.2      Absolute Basophils 0.0      Absolute Immature Granulocytes 0.1       Absolute NRBCs 0.0         Imaging   CT Abdomen Pelvis w Contrast   Final Result   IMPRESSION:    1.  Ill-defined area of hazy increased density in the omental fat is   new since the previous exam, and suspicious for omental infarction.   2.  Colonic diverticulosis, without evidence for diverticulitis.   3.  Hepatic steatosis.      SHANNAN MOELLER MD            SYSTEM ID:  [id number]          Independent Interpretation   None    ED Course      Medications Administered   Medications   sodium chloride 0.9 % bag 100 mL for CT scan flush use (100 mLs As instructed $Given 8/19/24 0955)   ondansetron (ZOFRAN) injection 4 mg (4 mg Intravenous $Given 8/19/24 0737)   ketorolac (TORADOL) injection 15 mg (15 mg Intravenous $Given 8/19/24 0920)   HYDROmorphone (PF) (DILAUDID) injection 0.5 mg (0.5 mg Intravenous Not Given 8/19/24 1032)   iopamidol (ISOVUE-370) solution 500 mL (100 mLs Intravenous $Given 8/19/24 0955)       Procedures   Procedures     Discussion of Management   None    ED Course   ED Course as of 08/19/24 1203   Mon Aug 19, 2024   0902 I obtained history and examined the patient as noted above.    1025 I rechecked and updated the patient.        Additional Documentation  None    Medical Decision Making / Diagnosis     CMS Diagnoses: None    MIPS       None    [facility]   Karen Buenrostro is a 33 year old female who presents with generalized abdominal pain along with nausea, vomiting, and diarrhea.  On exam, patient appears uncomfortable due to severity of pain.  She has generalized pain to palpation of entirety of abdomen with no point tenderness.  Voluntary guarding present.  She is vitally stable and afebrile.  Patient was provided Toradol and Zofran for symptomatic relief along with IV fluids.  Labs are reassuring as her urine does not show evidence of infection, hCG is negative, no electrolyte derangements and renal and liver function is normal.  CT abdomen pelvis completed shows findings suggestive of  omental infarct in the lower abdomen near midline.  Patient has been complaining of pain more present in periumbilical region which would be consistent with this.  Discussed imaging results with patient.  On reassessment, she is feeling symptomatically much improved and is comfortable discharge home.  Omental infarction is self-limited and pain will improve with some time.  Encouraged her to continue ibuprofen and Tylenol, Rx for Zofran provided.  Also sent patient home with stool studies for completion.  Recommend she follow-up with her primary care provider within the week for reassessment.  If pain worsens and cannot be controlled at home or other concerning symptoms develop, return to ER.        Disposition   The patient was discharged.     Diagnosis     ICD-10-CM    1. Omental infarction (H24)  K55.069       2. Nausea vomiting and diarrhea  R11.2 Enteric Bacteria and Virus Panel by NEFTALI Stool    R19.7 C. difficile Toxin B PCR with reflex to C. difficile Antigen and Toxins A/B EIA           Discharge Medications   Discharge Medication List as of 8/19/2024 10:33 AM        START taking these medications    Details   ondansetron (ZOFRAN ODT) 4 MG ODT tab Take 1 tablet (4 mg) by mouth every 8 hours as needed for nausea, Disp-30 tablet, R-0, E-Prescribe             Scribe Disclosure:  I, Clemencia Gamboa, am serving as a scribe at 9:31 AM on 8/19/2024 to document services personally performed by Merry Palma PA-C based on my observations and the provider's statements to me.        Merry Palma PA-C  08/19/24 [id number]

## 2024-08-20 ENCOUNTER — PATIENT OUTREACH (OUTPATIENT)
Dept: PEDIATRICS | Facility: CLINIC | Age: 34
End: 2024-08-20
Payer: COMMERCIAL

## 2024-08-20 NOTE — TELEPHONE ENCOUNTER
Call patient for hospital follow up.  Most Recent Admission Date: 8/19/2024   Most Recent Admission Diagnosis:      Most Recent Discharge Date: 8/19/2024   Most Recent Discharge Diagnosis: Omental infarction (H24) - K55.069  Nausea vomiting and diarrhea - R11.2, R19.7     Med changes: ondansetron (ZOFRAN ODT)    After Visit Summary follow up recommendations: Schedule an appointment with Ashley Head MD  as soon as possible for a visit in 1 week    Primary care appointment needed within 7 days    Primary care hospital follow up appointment has not been made.    Jenise Levin RN

## 2024-08-20 NOTE — TELEPHONE ENCOUNTER
Attempt #1: Called and LVMTCB    Admitted to ED 8/19/2024 for generalized abdominal pain.  Omental infarction is self-limited and pain will improve with some time. Encouraged her to continue ibuprofen and Tylenol, Rx for Zofran provided. Also sent patient home with stool studies for completion.     Prescribed:  ondansetron (ZOFRAN ODT) 4 MG ODT tab to [id number] - [place], [place] - [id number] [address]    Recommend she follow-up with her primary care provider within the week for reassessment. Not scheduled at this time.      Vidya Francis RN

## 2024-08-22 NOTE — TELEPHONE ENCOUNTER
Attempt #3: Called and LVMTCB to call back and ask to speak to a nurse. Also sent [employer] message.     Admitted to ED 8/19/2024 for generalized abdominal pain.  Omental infarction is self-limited and pain will improve with some time. Encouraged her to continue ibuprofen and Tylenol, Rx for Zofran provided. Also sent patient home with stool studies for completion.      Prescribed:  ondansetron (ZOFRAN ODT) 4 MG ODT tab to [id number] - [facility] [id number] [address]     Recommend she follow-up with her primary care provider within the week for reassessment. Not scheduled at this time.        Vidya Francis RN

## 2024-09-21 ENCOUNTER — HEALTH MAINTENANCE LETTER (OUTPATIENT)
Age: 34
End: 2024-09-21

## 2024-12-21 ENCOUNTER — APPOINTMENT (OUTPATIENT)
Dept: CT IMAGING | Facility: CLINIC | Age: 34
End: 2024-12-21
Attending: EMERGENCY MEDICINE
Payer: COMMERCIAL

## 2024-12-21 ENCOUNTER — HOSPITAL ENCOUNTER (EMERGENCY)
Facility: CLINIC | Age: 34
Discharge: HOME | End: 2024-12-21
Attending: EMERGENCY MEDICINE | Admitting: EMERGENCY MEDICINE
Payer: COMMERCIAL

## 2024-12-21 VITALS
OXYGEN SATURATION: 97 % | SYSTOLIC BLOOD PRESSURE: 131 MMHG | WEIGHT: 260 LBS | BODY MASS INDEX: 39.4 KG/M2 | TEMPERATURE: 97.6 F | HEART RATE: 72 BPM | RESPIRATION RATE: 18 BRPM | HEIGHT: 68 IN | DIASTOLIC BLOOD PRESSURE: 92 MMHG

## 2024-12-21 DIAGNOSIS — K57.92 ACUTE DIVERTICULITIS: ICD-10-CM

## 2024-12-21 DIAGNOSIS — K76.9 LIVER LESION: ICD-10-CM

## 2024-12-21 LAB
ALBUMIN SERPL BCG-MCNC: 4.2 G/DL (ref 3.5–5.2)
ALBUMIN UR-MCNC: NEGATIVE MG/DL
ALP SERPL-CCNC: 105 U/L (ref 40–150)
ALT SERPL W P-5'-P-CCNC: 29 U/L (ref 0–50)
ANION GAP SERPL CALCULATED.3IONS-SCNC: 16 MMOL/L (ref 7–15)
APPEARANCE UR: ABNORMAL
AST SERPL W P-5'-P-CCNC: 19 U/L (ref 0–45)
BASOPHILS # BLD AUTO: 0 10E3/UL (ref 0–0.2)
BASOPHILS NFR BLD AUTO: 0 %
BILIRUB SERPL-MCNC: 0.6 MG/DL
BILIRUB UR QL STRIP: NEGATIVE
BUN SERPL-MCNC: 16.3 MG/DL (ref 6–20)
CALCIUM SERPL-MCNC: 9.2 MG/DL (ref 8.8–10.4)
CHLORIDE SERPL-SCNC: 105 MMOL/L (ref 98–107)
COLOR UR AUTO: YELLOW
CREAT SERPL-MCNC: 0.63 MG/DL (ref 0.51–0.95)
EGFRCR SERPLBLD CKD-EPI 2021: >90 ML/MIN/1.73M2
EOSINOPHIL # BLD AUTO: 0.2 10E3/UL (ref 0–0.7)
EOSINOPHIL NFR BLD AUTO: 2 %
ERYTHROCYTE [DISTWIDTH] IN BLOOD BY AUTOMATED COUNT: 12.7 % (ref 10–15)
GLUCOSE SERPL-MCNC: 93 MG/DL (ref 70–99)
GLUCOSE UR STRIP-MCNC: NEGATIVE MG/DL
HCG UR QL: NEGATIVE
HCO3 SERPL-SCNC: 18 MMOL/L (ref 22–29)
HCT VFR BLD AUTO: 42.9 % (ref 35–47)
HGB BLD-MCNC: 15 G/DL (ref 11.7–15.7)
HGB UR QL STRIP: NEGATIVE
IMM GRANULOCYTES # BLD: 0.1 10E3/UL
IMM GRANULOCYTES NFR BLD: 1 %
KETONES UR STRIP-MCNC: NEGATIVE MG/DL
LEUKOCYTE ESTERASE UR QL STRIP: NEGATIVE
LYMPHOCYTES # BLD AUTO: 2.3 10E3/UL (ref 0.8–5.3)
LYMPHOCYTES NFR BLD AUTO: 20 %
MCH RBC QN AUTO: 29.7 PG (ref 26.5–33)
MCHC RBC AUTO-ENTMCNC: 35 G/DL (ref 31.5–36.5)
MCV RBC AUTO: 85 FL (ref 78–100)
MONOCYTES # BLD AUTO: 0.9 10E3/UL (ref 0–1.3)
MONOCYTES NFR BLD AUTO: 8 %
MUCOUS THREADS #/AREA URNS LPF: PRESENT /LPF
NEUTROPHILS # BLD AUTO: 8.2 10E3/UL (ref 1.6–8.3)
NEUTROPHILS NFR BLD AUTO: 71 %
NITRATE UR QL: NEGATIVE
NRBC # BLD AUTO: 0 10E3/UL
NRBC BLD AUTO-RTO: 0 /100
PH UR STRIP: 6 [PH] (ref 5–7)
PLATELET # BLD AUTO: 245 10E3/UL (ref 150–450)
POTASSIUM SERPL-SCNC: 4.3 MMOL/L (ref 3.4–5.3)
PROT SERPL-MCNC: 7.4 G/DL (ref 6.4–8.3)
RBC # BLD AUTO: 5.05 10E6/UL (ref 3.8–5.2)
RBC URINE: 1 /HPF
SODIUM SERPL-SCNC: 139 MMOL/L (ref 135–145)
SP GR UR STRIP: 1.01 (ref 1–1.03)
SQUAMOUS EPITHELIAL: 6 /HPF
UROBILINOGEN UR STRIP-MCNC: NORMAL MG/DL
WBC # BLD AUTO: 11.7 10E3/UL (ref 4–11)
WBC URINE: 4 /HPF

## 2024-12-21 PROCEDURE — 82247 BILIRUBIN TOTAL: CPT | Performed by: EMERGENCY MEDICINE

## 2024-12-21 PROCEDURE — 96375 TX/PRO/DX INJ NEW DRUG ADDON: CPT

## 2024-12-21 PROCEDURE — 96361 HYDRATE IV INFUSION ADD-ON: CPT

## 2024-12-21 PROCEDURE — 99285 EMERGENCY DEPT VISIT HI MDM: CPT | Mod: 25

## 2024-12-21 PROCEDURE — 96374 THER/PROPH/DIAG INJ IV PUSH: CPT | Mod: 59

## 2024-12-21 PROCEDURE — 258N000003 HC RX IP 258 OP 636: Performed by: EMERGENCY MEDICINE

## 2024-12-21 PROCEDURE — 81001 URINALYSIS AUTO W/SCOPE: CPT | Performed by: EMERGENCY MEDICINE

## 2024-12-21 PROCEDURE — 250N000011 HC RX IP 250 OP 636: Performed by: EMERGENCY MEDICINE

## 2024-12-21 PROCEDURE — 250N000009 HC RX 250: Performed by: EMERGENCY MEDICINE

## 2024-12-21 PROCEDURE — 82947 ASSAY GLUCOSE BLOOD QUANT: CPT | Performed by: EMERGENCY MEDICINE

## 2024-12-21 PROCEDURE — 36415 COLL VENOUS BLD VENIPUNCTURE: CPT | Performed by: EMERGENCY MEDICINE

## 2024-12-21 PROCEDURE — 74177 CT ABD & PELVIS W/CONTRAST: CPT

## 2024-12-21 PROCEDURE — 81025 URINE PREGNANCY TEST: CPT | Performed by: EMERGENCY MEDICINE

## 2024-12-21 PROCEDURE — 85004 AUTOMATED DIFF WBC COUNT: CPT | Performed by: EMERGENCY MEDICINE

## 2024-12-21 RX ORDER — IOPAMIDOL 755 MG/ML
500 INJECTION, SOLUTION INTRAVASCULAR ONCE
Status: COMPLETED | OUTPATIENT
Start: 2024-12-21 | End: 2024-12-21

## 2024-12-21 RX ORDER — ONDANSETRON 2 MG/ML
4 INJECTION INTRAMUSCULAR; INTRAVENOUS ONCE
Status: COMPLETED | OUTPATIENT
Start: 2024-12-21 | End: 2024-12-21

## 2024-12-21 RX ORDER — KETOROLAC TROMETHAMINE 15 MG/ML
15 INJECTION, SOLUTION INTRAMUSCULAR; INTRAVENOUS ONCE
Status: COMPLETED | OUTPATIENT
Start: 2024-12-21 | End: 2024-12-21

## 2024-12-21 RX ADMIN — KETOROLAC TROMETHAMINE 15 MG: 15 INJECTION, SOLUTION INTRAMUSCULAR; INTRAVENOUS at 11:24

## 2024-12-21 RX ADMIN — SODIUM CHLORIDE 65 ML: 9 INJECTION, SOLUTION INTRAVENOUS at 12:21

## 2024-12-21 RX ADMIN — ONDANSETRON 4 MG: 2 INJECTION INTRAMUSCULAR; INTRAVENOUS at 11:23

## 2024-12-21 RX ADMIN — SODIUM CHLORIDE 1000 ML: 9 INJECTION, SOLUTION INTRAVENOUS at 11:23

## 2024-12-21 RX ADMIN — IOPAMIDOL 100 ML: 755 INJECTION, SOLUTION INTRAVENOUS at 12:21

## 2024-12-21 ASSESSMENT — COLUMBIA-SUICIDE SEVERITY RATING SCALE - C-SSRS
1. IN THE PAST MONTH, HAVE YOU WISHED YOU WERE DEAD OR WISHED YOU COULD GO TO SLEEP AND NOT WAKE UP?: NO
6. HAVE YOU EVER DONE ANYTHING, STARTED TO DO ANYTHING, OR PREPARED TO DO ANYTHING TO END YOUR LIFE?: NO
2. HAVE YOU ACTUALLY HAD ANY THOUGHTS OF KILLING YOURSELF IN THE PAST MONTH?: NO

## 2024-12-21 ASSESSMENT — ACTIVITIES OF DAILY LIVING (ADL)
ADLS_ACUITY_SCORE: 41

## 2024-12-21 NOTE — ED TRIAGE NOTES
Pt here with c/o abdominal pain and nausea that started on Thursday. Denies fevers, vomiting and diarrhea.

## 2024-12-21 NOTE — LETTER
December 21, 2024      To Whom It May Concern:      Karen Buenrostro was seen in our Emergency Department today, 12/21/24.  I expect her condition to improve over the next 2-3 days.  She may return to work/school when improved.    Sincerely,        She HAZEL/ Dr. Stokes

## 2024-12-21 NOTE — ED PROVIDER NOTES
"  Emergency Department Note      History of Present Illness     Chief Complaint   Abdominal Pain      HPI   Karen Buenrostro is a 34 year old female with a history of diverticulitis, dyslipidemia, duodenitis, fatty liver disease, and COPD who presents to the ED for abdominal pain. The patient reports waking up this morning with a sudden onset of left flank and abdominal pain with nausea, prompting today's visit. She reports that she has had similar symptoms with previous diverticulitis, noting that she has had diverticulitis five times with the most recent being February 2024, also adding that she has had two hospitalizations in relation. She endorses a diet change, mentioning a fiber based diet with strict fluid intake. She also endorses pain with deep breaths and hematochezia, but notes that she has a hemorrhoid. She denies any abdominal surgeries, melena, or a history of nephrolithiasis. She reports prior discussions have been held regarding possible partial colectomy given recurrent diverticulitis.    Independent Historian   None    Review of External Notes   I reviewed the patients visit from 8/19/24, diagnosed with omental infarction.    Past Medical History     Medical History and Problem List   Acute diverticulitis  Anxiety  Dyslipidemia  Hepatic lesions  Migraines  Asthma  COPD  Duodenitis  Fatty liver disease, nonalcoholic  Post concussion syndrome    Medications   Zoloft  Ashlyna    Surgical History   Cholecystectomy    Physical Exam     Patient Vitals for the past 24 hrs:   BP Temp Temp src Pulse Resp SpO2 Height Weight   12/21/24 1345 -- -- -- -- -- 97 % -- --   12/21/24 1340 (!) 131/92 -- -- 72 -- -- -- --   12/21/24 0952 (!) 151/103 97.6  F (36.4  C) Oral 89 18 98 % 1.727 m (5' 8\") 117.9 kg (260 lb)     Physical Exam    General:              Well-nourished              Speaking in full sentences  Eyes:              Conjunctiva without injection or scleral icterus  ENT:              Moist mucous " membranes              Nares patent              Pinnae normal  Neck:              Full ROM              No stiffness appreciated  Resp:              Lungs CTAB              No crackles, wheezing or audible rubs              Good air movement  CV:                    Normal rate, regular rhythm              S1 and S2 present              No murmur, gallop or rub  GI:              BS present              Abdomen soft without distention              Tenderness to palpation to left mid-abdomen              No guarding or rebound tenderness  Skin:              Warm, dry, well perfused              No rashes or open wounds on exposed skin  MSK:              Moves all extremities              No focal deformities or swelling  Neuro:              Alert              Answers questions appropriately              Moves all extremities equally              Gait stable  Psych:              Normal affect, normal mood       Diagnostics     Lab Results   Labs Ordered and Resulted from Time of ED Arrival to Time of ED Departure   COMPREHENSIVE METABOLIC PANEL - Abnormal       Result Value    Sodium 139      Potassium 4.3      Carbon Dioxide (CO2) 18 (*)     Anion Gap 16 (*)     Urea Nitrogen 16.3      Creatinine 0.63      GFR Estimate >90      Calcium 9.2      Chloride 105      Glucose 93      Alkaline Phosphatase 105      AST 19      ALT 29      Protein Total 7.4      Albumin 4.2      Bilirubin Total 0.6     ROUTINE UA WITH MICROSCOPIC REFLEX TO CULTURE - Abnormal    Color Urine Yellow      Appearance Urine Slightly Cloudy (*)     Glucose Urine Negative      Bilirubin Urine Negative      Ketones Urine Negative      Specific Gravity Urine 1.015      Blood Urine Negative      pH Urine 6.0      Protein Albumin Urine Negative      Urobilinogen Urine Normal      Nitrite Urine Negative      Leukocyte Esterase Urine Negative      Mucus Urine Present (*)     RBC Urine 1      WBC Urine 4      Squamous Epithelials Urine 6 (*)    CBC WITH  PLATELETS AND DIFFERENTIAL - Abnormal    WBC Count 11.7 (*)     RBC Count 5.05      Hemoglobin 15.0      Hematocrit 42.9      MCV 85      MCH 29.7      MCHC 35.0      RDW 12.7      Platelet Count 245      % Neutrophils 71      % Lymphocytes 20      % Monocytes 8      % Eosinophils 2      % Basophils 0      % Immature Granulocytes 1      NRBCs per 100 WBC 0      Absolute Neutrophils 8.2      Absolute Lymphocytes 2.3      Absolute Monocytes 0.9      Absolute Eosinophils 0.2      Absolute Basophils 0.0      Absolute Immature Granulocytes 0.1      Absolute NRBCs 0.0     HCG QUALITATIVE URINE - Normal    hCG Urine Qualitative Negative         Imaging   CT Abdomen Pelvis w Contrast   Final Result   IMPRESSION:    1.  Acute uncomplicated diverticulitis of the descending colon.   2.  Similar subtle enhancing lesions in the liver. These have been previously evaluated with MRI from 11/20/2023 in the [facility]. These images are not available at the time of dictation but the report is available. Continued    surveillance/evaluation with nonemergent outpatient liver MRI if not recently performed.        Independent Interpretation   None    ED Course      Medications Administered   Medications   ketorolac (TORADOL) injection 15 mg (15 mg Intravenous $Given 12/21/24 1124)   ondansetron (ZOFRAN) injection 4 mg (4 mg Intravenous $Given 12/21/24 1123)   sodium chloride 0.9% BOLUS 1,000 mL (0 mLs Intravenous Stopped 12/21/24 1354)   iopamidol (ISOVUE-370) solution 500 mL (100 mLs Intravenous $Given 12/21/24 1221)   CT scan flush (65 mLs Intravenous $Given 12/21/24 1221)       Procedures   Procedures     Discussion of Management   None    ED Course   ED Course as of 12/21/24 1704   Sat Dec 21, 2024   1048 I obtained history and examined the patient as noted above     1200 I rechecked the patient and explained findings. Patient is feeling better.   1344 I rechecked the patient and explained findings. Patient is feeling well  enough to head home.         Additional Documentation  None    Medical Decision Making / Diagnosis     CMS Diagnoses: None    MIPS       None    MDM   Karen Buenrostro is a 34 year old female who presents with abdominal pain.  Vital signs on presentation reveal elevated BP, which improved on re-check, though are otherwise unremarkable.  Differential diagnosis is broad, including but not limited to, diverticulitis, ischemic colitis, bacterial colitis, acute appendicitis with perforation, inflammatory bowel disease, IBS, pancreatic disease, renal colic, peptic ulcer disease, referred aortic pathology, ovarian pathology.  Given presenting history and examination findings, advanced imaging was obtained as above. CT confirms diverticulitis without abscess or perforation.  Laboratory studies reveal mild leukocytosis, though are otherwise unremarkable.  Incidental hepatic lesions reviewed, which patient is well aware of and has already had outpatient work-up of. Her pain has been controlled by the interventions in the emergency department. On recheck, she has a non-surgical exam, pain is controlled, and she is tolerating POs.  I discussed indications for admission versus discharge and together we opted for outpatient management.  She is not exhibiting intractable pain, high fevers, peritonitis, severe disease on CT, nor is the current episode complicated by immunocompromised state, significant comorbid disease (CKD, DM, COPD), nor prior failure of outpatient therapy.   I will prescribe augmentin and supportive medications as per below.  We discussed the natural history of this problem, and I discussed dietary precautions. I recommended she return to the ED for worsening pain, fever, vomiting, bloody or black stools, or for any other concerning symptoms. I recommended she follow up with her primary care physician in 2-3 days.    Disposition   The patient was discharged.     Diagnosis     ICD-10-CM    1. Acute  diverticulitis  K57.92       2. Liver lesion  K76.9            Discharge Medications   Discharge Medication List as of 12/21/2024  1:58 PM        START taking these medications    Details   amoxicillin-clavulanate (AUGMENTIN) 875-125 MG tablet Take 1 tablet by mouth 2 times daily for 10 days., Disp-20 tablet, R-0, E-Prescribe               Scribe Disclosure:  I, Neri Crespo, am serving as a scribe at 10:56 AM on 12/21/2024 to document services personally performed by Chay Stokes MD based on my observations and the provider's statements to me.        Chay Stokes MD  12/21/24 [id number]

## 2024-12-21 NOTE — Clinical Note
Karen Buenrostro was seen and treated in our emergency department on 12/21/2024.  She may return to work on [unfilled].  [unfilled]     If you have any questions or concerns, please don't hesitate to call.      [unfilled]

## 2024-12-21 NOTE — DISCHARGE INSTRUCTIONS
Discharge Instructions  Diverticulitis    Your provider has diagnosed you with diverticulitis.  Diverticulitis is inflammation of a diverticulum, which is a tiny sack-like structure that protrudes off the wall of the colon (large intestine).  These sacks are created over years of increased pressure in the colon (this is diverticulosis), usually as a result of a diet without enough fruits, vegetables, and whole grains. Diverticulosis doesn't cause symptoms.    When these sacks get inflamed, it is called divericulitis.  Diverticulitis causes abdominal (belly) pain, fever, nausea (sick to stomach), and vomiting (throwing up). Diverticulitis is usually treated at home.  However, sometimes diverticulitis needs treatment in the hospital and may even need surgery.      Generally, every Emergency Department visit should have a follow-up clinic visit with either a primary or a specialty clinic/provider. Please follow-up as instructed by your emergency provider today.    Return to the Emergency Department if:   You get an oral temperature above 102oF or as directed by your provider.  You have blood in your stools (bright red or black, tarry stools), or have blood in your vomit.  You keep vomiting or cannot drink liquids or cannot keep your medicine down.  You cannot have a bowel movement or you cannot pass gas.  Your stomach gets bloated or bigger.  You faint or become very weak.  Your pain is too bad to tolerate.  You have new symptoms or anything that worries you.    What can I do to help myself? How is diverticulitis treated?  Diverticulitis can be treated without antibiotics in many cases. In the past, diverticulitis was routinely treated with antibiotics.  However, research over the past decade has found that antibiotic use in most cases of diverticulitis does not improve healing because the process may be more inflammatory, rather than infectious.  Antibiotics also wipe out good, healthy gut bacteria, which we are  "increasingly aware are important to overall health.  Some patients with certain medical conditions, lab results, or duration of symptoms may still need antibiotics. If you were directed to take antibiotics, take them right away and be sure to finish the whole antibiotic prescription.  Regardless of whether or not you are treated with antibiotics, for the first day or two at home drink only clear liquids.  This lets your intestines rest.  If your pain has improved after one or two days, you may start eating mild foods. Soda crackers, toast, plain noodles, gelatin, applesauce and bananas are good first choices.  Avoid foods that have acid, are spicy, fatty or fibrous (such as meats, coarse grains, vegetables). You may start eating these foods again in about 3-4 days when you are better.  Once you are back to normal, eat a high fiber diet of fruits, vegetables and whole grains. Some people think you should avoid eating nuts, seeds, and corn, but there is no definite proof this makes any difference in whether you will get diverticulitis again.   Pain and fever can be treated with acetaminophen (Tylenol ), ibuprofen (Advil ) or you might have been prescribed a pain medication.  Hot packs or a heating pad may also feel good.    When Should I Follow Up After I Am Feeling Better?  Follow-up as directed by your provider during your visit.  If this was your first episode of diverticulitis, you should have a colonoscopy within 6-8 weeks because in a small number of patients, malignancy (cancer) can look like diverticulitis.   You may not need a colonoscopy if you recently had one. Talk to your primary care doctor to determine if you need a colonoscopy.    Probiotics: If you have been given an antibiotic, you may want to also take a probiotic pill or eat yogurt with live cultures. Probiotics have \"good bacteria\" to help your intestines stay healthy. Studies have shown that probiotics help prevent diarrhea and other intestine " problems (including C. diff infection) when you take antibiotics. You can buy these without a prescription in the pharmacy section of the store.    If you were given a prescription for medicine here today, be sure to read all of the information (including the package insert) that comes with your prescription.  This will include important information about the medicine, its side effects, and any warnings that you need to know about.  The pharmacist who fills the prescription can provide more information and answer questions you may have about the medicine.  If you have questions or concerns that the pharmacist cannot address, please call or return to the Emergency Department.     Remember that you can always come back to the Emergency Department if you are not able to see your regular provider in the amount of time listed above, if you get any new symptoms, or if there is anything that worries you.

## 2025-02-14 LAB
ALBUMIN UR-MCNC: NEGATIVE MG/DL
ANION GAP SERPL CALCULATED.3IONS-SCNC: 11 MMOL/L (ref 7–15)
APPEARANCE UR: CLEAR
BACTERIA #/AREA URNS HPF: ABNORMAL /HPF
BASOPHILS # BLD AUTO: 0 10E3/UL (ref 0–0.2)
BASOPHILS NFR BLD AUTO: 0 %
BILIRUB UR QL STRIP: NEGATIVE
BUN SERPL-MCNC: 13.6 MG/DL (ref 6–20)
CALCIUM SERPL-MCNC: 8.9 MG/DL (ref 8.8–10.4)
CHLORIDE SERPL-SCNC: 100 MMOL/L (ref 98–107)
COLOR UR AUTO: ABNORMAL
CREAT SERPL-MCNC: 0.67 MG/DL (ref 0.51–0.95)
EGFRCR SERPLBLD CKD-EPI 2021: >90 ML/MIN/1.73M2
EOSINOPHIL # BLD AUTO: 0.3 10E3/UL (ref 0–0.7)
EOSINOPHIL NFR BLD AUTO: 3 %
ERYTHROCYTE [DISTWIDTH] IN BLOOD BY AUTOMATED COUNT: 12.9 % (ref 10–15)
GLUCOSE SERPL-MCNC: 104 MG/DL (ref 70–99)
GLUCOSE UR STRIP-MCNC: NEGATIVE MG/DL
HCG UR QL: NEGATIVE
HCO3 SERPL-SCNC: 24 MMOL/L (ref 22–29)
HCT VFR BLD AUTO: 40 % (ref 35–47)
HGB BLD-MCNC: 13.4 G/DL (ref 11.7–15.7)
HGB UR QL STRIP: NEGATIVE
IMM GRANULOCYTES # BLD: 0.1 10E3/UL
IMM GRANULOCYTES NFR BLD: 1 %
KETONES UR STRIP-MCNC: NEGATIVE MG/DL
LEUKOCYTE ESTERASE UR QL STRIP: NEGATIVE
LYMPHOCYTES # BLD AUTO: 3.4 10E3/UL (ref 0.8–5.3)
LYMPHOCYTES NFR BLD AUTO: 27 %
MCH RBC QN AUTO: 28.7 PG (ref 26.5–33)
MCHC RBC AUTO-ENTMCNC: 33.5 G/DL (ref 31.5–36.5)
MCV RBC AUTO: 86 FL (ref 78–100)
MONOCYTES # BLD AUTO: 1.2 10E3/UL (ref 0–1.3)
MONOCYTES NFR BLD AUTO: 9 %
MUCOUS THREADS #/AREA URNS LPF: PRESENT /LPF
NEUTROPHILS # BLD AUTO: 7.4 10E3/UL (ref 1.6–8.3)
NEUTROPHILS NFR BLD AUTO: 60 %
NITRATE UR QL: NEGATIVE
NRBC # BLD AUTO: 0 10E3/UL
NRBC BLD AUTO-RTO: 0 /100
PH UR STRIP: 5.5 [PH] (ref 5–7)
PLATELET # BLD AUTO: 278 10E3/UL (ref 150–450)
POTASSIUM SERPL-SCNC: 3.7 MMOL/L (ref 3.4–5.3)
RBC # BLD AUTO: 4.67 10E6/UL (ref 3.8–5.2)
RBC URINE: 1 /HPF
SODIUM SERPL-SCNC: 135 MMOL/L (ref 135–145)
SP GR UR STRIP: 1.02 (ref 1–1.03)
SQUAMOUS EPITHELIAL: 3 /HPF
UROBILINOGEN UR STRIP-MCNC: NORMAL MG/DL
WBC # BLD AUTO: 12.4 10E3/UL (ref 4–11)
WBC URINE: 3 /HPF

## 2025-02-14 PROCEDURE — 85025 COMPLETE CBC W/AUTO DIFF WBC: CPT | Performed by: EMERGENCY MEDICINE

## 2025-02-14 PROCEDURE — 81025 URINE PREGNANCY TEST: CPT | Performed by: EMERGENCY MEDICINE

## 2025-02-14 PROCEDURE — 81001 URINALYSIS AUTO W/SCOPE: CPT | Performed by: EMERGENCY MEDICINE

## 2025-02-14 PROCEDURE — 36415 COLL VENOUS BLD VENIPUNCTURE: CPT | Performed by: EMERGENCY MEDICINE

## 2025-02-14 PROCEDURE — 99285 EMERGENCY DEPT VISIT HI MDM: CPT | Mod: 25

## 2025-02-14 PROCEDURE — 80048 BASIC METABOLIC PNL TOTAL CA: CPT | Performed by: EMERGENCY MEDICINE

## 2025-02-14 PROCEDURE — 82435 ASSAY OF BLOOD CHLORIDE: CPT | Performed by: EMERGENCY MEDICINE

## 2025-02-14 ASSESSMENT — COLUMBIA-SUICIDE SEVERITY RATING SCALE - C-SSRS
2. HAVE YOU ACTUALLY HAD ANY THOUGHTS OF KILLING YOURSELF IN THE PAST MONTH?: NO
1. IN THE PAST MONTH, HAVE YOU WISHED YOU WERE DEAD OR WISHED YOU COULD GO TO SLEEP AND NOT WAKE UP?: NO
6. HAVE YOU EVER DONE ANYTHING, STARTED TO DO ANYTHING, OR PREPARED TO DO ANYTHING TO END YOUR LIFE?: NO

## 2025-02-15 ENCOUNTER — HOSPITAL ENCOUNTER (EMERGENCY)
Facility: CLINIC | Age: 35
Discharge: HOME | End: 2025-02-15
Attending: EMERGENCY MEDICINE | Admitting: EMERGENCY MEDICINE
Payer: COMMERCIAL

## 2025-02-15 ENCOUNTER — APPOINTMENT (OUTPATIENT)
Dept: CT IMAGING | Facility: CLINIC | Age: 35
End: 2025-02-15
Attending: EMERGENCY MEDICINE
Payer: COMMERCIAL

## 2025-02-15 VITALS
TEMPERATURE: 98.1 F | DIASTOLIC BLOOD PRESSURE: 81 MMHG | WEIGHT: 293 LBS | HEART RATE: 77 BPM | RESPIRATION RATE: 22 BRPM | SYSTOLIC BLOOD PRESSURE: 144 MMHG | BODY MASS INDEX: 44.41 KG/M2 | OXYGEN SATURATION: 97 % | HEIGHT: 68 IN

## 2025-02-15 DIAGNOSIS — K63.89 EPIPLOIC APPENDAGITIS: ICD-10-CM

## 2025-02-15 DIAGNOSIS — K57.32 DIVERTICULITIS OF COLON: ICD-10-CM

## 2025-02-15 LAB
HOLD SPECIMEN: NORMAL
HOLD SPECIMEN: NORMAL

## 2025-02-15 PROCEDURE — 250N000009 HC RX 250: Performed by: EMERGENCY MEDICINE

## 2025-02-15 PROCEDURE — 250N000011 HC RX IP 250 OP 636: Performed by: EMERGENCY MEDICINE

## 2025-02-15 PROCEDURE — 74177 CT ABD & PELVIS W/CONTRAST: CPT

## 2025-02-15 PROCEDURE — 96365 THER/PROPH/DIAG IV INF INIT: CPT | Mod: 59

## 2025-02-15 PROCEDURE — 96375 TX/PRO/DX INJ NEW DRUG ADDON: CPT

## 2025-02-15 RX ORDER — AMPICILLIN AND SULBACTAM 2; 1 G/1; G/1
3 INJECTION, POWDER, FOR SOLUTION INTRAMUSCULAR; INTRAVENOUS ONCE
Status: COMPLETED | OUTPATIENT
Start: 2025-02-15 | End: 2025-02-15

## 2025-02-15 RX ORDER — OXYCODONE HYDROCHLORIDE 5 MG/1
5 TABLET ORAL EVERY 6 HOURS PRN
Qty: 12 TABLET | Refills: 0 | Status: SHIPPED | OUTPATIENT
Start: 2025-02-15 | End: 2025-02-18

## 2025-02-15 RX ORDER — MORPHINE SULFATE 4 MG/ML
4 INJECTION, SOLUTION INTRAMUSCULAR; INTRAVENOUS
Status: DISCONTINUED | OUTPATIENT
Start: 2025-02-15 | End: 2025-02-15 | Stop reason: HOSPADM

## 2025-02-15 RX ORDER — IOPAMIDOL 755 MG/ML
500 INJECTION, SOLUTION INTRAVASCULAR ONCE
Status: COMPLETED | OUTPATIENT
Start: 2025-02-15 | End: 2025-02-15

## 2025-02-15 RX ADMIN — AMPICILLIN SODIUM AND SULBACTAM SODIUM 3 G: 2; 1 INJECTION, POWDER, FOR SOLUTION INTRAMUSCULAR; INTRAVENOUS at 02:35

## 2025-02-15 RX ADMIN — IOPAMIDOL 100 ML: 755 INJECTION, SOLUTION INTRAVENOUS at 01:38

## 2025-02-15 RX ADMIN — MORPHINE SULFATE 4 MG: 4 INJECTION, SOLUTION INTRAMUSCULAR; INTRAVENOUS at 02:35

## 2025-02-15 RX ADMIN — MORPHINE SULFATE 4 MG: 4 INJECTION, SOLUTION INTRAMUSCULAR; INTRAVENOUS at 03:05

## 2025-02-15 RX ADMIN — SODIUM CHLORIDE 65 ML: 9 INJECTION, SOLUTION INTRAVENOUS at 01:38

## 2025-02-15 ASSESSMENT — ACTIVITIES OF DAILY LIVING (ADL)
ADLS_ACUITY_SCORE: 41
ADLS_ACUITY_SCORE: 41

## 2025-02-15 NOTE — ED PROVIDER NOTES
"  Emergency Department Note      History of Present Illness     Chief Complaint   Abdominal Pain and Flank Pain      HPI   Karen Buenrostro is a 34 year old female with past medical history of of fatty liver disease and diverticulitis who presents to the emergency department with left-sided flank pain that radiates into her groin.  She notes that this feels different than past episodes of diverticulitis.  This is more along her flank and is more severe in nature.  She notes pain started on Thursday and is gradually worsened over time.  She describes it as a dull constant ache.  She denies any nausea or vomiting.  Notes some chills but no fever.  She denies any dysuria or hematuria.  No personal history of kidney stones.  Denies any pleuritic chest pain.    Past Medical History     Medical History and Problem List   Past Medical History:   Diagnosis Date    COPD (chronic obstructive pulmonary disease) (H) not sure    Diverticulitis     Duodenitis determined by biopsy 2013    Fatty liver disease, nonalcoholic     Post concussion syndrome 2011    Uncomplicated asthma couple years ago       Medications   albuterol (PROAIR HFA/PROVENTIL HFA/VENTOLIN HFA) 108 (90 Base) MCG/ACT inhaler  amoxicillin-clavulanate (AUGMENTIN) 875-125 MG tablet  fluticasone-salmeterol (ADVAIR-HFA) 230-21 MCG/ACT inhaler  levonorgest-eth estrad 91-Day (ASHLYNA) 0.15-0.03 &0.01 MG tablet  ondansetron (ZOFRAN ODT) 4 MG ODT tab  oxyCODONE (ROXICODONE) 5 MG tablet        Surgical History   Past Surgical History:   Procedure Laterality Date    CHOLECYSTECTOMY         Physical Exam     Patient Vitals for the past 24 hrs:   BP Temp Pulse Resp SpO2 Height Weight   02/14/25 2253 (!) 144/8 98.1  F (36.7  C) 88 22 97 % 1.727 m (5' 8\") (!) 151 kg (332 lb 14.3 oz)     Physical Exam  General: Patient is awake, alert and interactive when I enter the room. Appears uncomfortable.   Head: The scalp, face, and head appear normal  Eyes: Conjunctivae and sclerae " are normal  Neck: Normal range of motion.   CV: Regular rate.   Resp:  No respiratory distress.   GI: left flank tenderness but abdomen is soft, no rigidity. No evidence of pulsatile mass. No fluid waves or evidence of ascites. No distension. No hernias or bruising are noted in detailed exam.   MS: Normal tone.   Skin: Normal capillary refill noted  Neuro: Speech is normal and fluent. Face is symmetric. Moving all extremities.   Psych:  Normal affect.  Appropriate interactions.    Diagnostics     Lab Results   Labs Ordered and Resulted from Time of ED Arrival to Time of ED Departure   BASIC METABOLIC PANEL - Abnormal       Result Value    Sodium 135      Potassium 3.7      Chloride 100      Carbon Dioxide (CO2) 24      Anion Gap 11      Urea Nitrogen 13.6      Creatinine 0.67      GFR Estimate >90      Calcium 8.9      Glucose 104 (*)    ROUTINE UA WITH MICROSCOPIC - Abnormal    Color Urine Light Yellow      Appearance Urine Clear      Glucose Urine Negative      Bilirubin Urine Negative      Ketones Urine Negative      Specific Gravity Urine 1.021      Blood Urine Negative      pH Urine 5.5      Protein Albumin Urine Negative      Urobilinogen Urine Normal      Nitrite Urine Negative      Leukocyte Esterase Urine Negative      Bacteria Urine Few (*)     Mucus Urine Present (*)     RBC Urine 1      WBC Urine 3      Squamous Epithelials Urine 3 (*)    CBC WITH PLATELETS AND DIFFERENTIAL - Abnormal    WBC Count 12.4 (*)     RBC Count 4.67      Hemoglobin 13.4      Hematocrit 40.0      MCV 86      MCH 28.7      MCHC 33.5      RDW 12.9      Platelet Count 278      % Neutrophils 60      % Lymphocytes 27      % Monocytes 9      % Eosinophils 3      % Basophils 0      % Immature Granulocytes 1      NRBCs per 100 WBC 0      Absolute Neutrophils 7.4      Absolute Lymphocytes 3.4      Absolute Monocytes 1.2      Absolute Eosinophils 0.3      Absolute Basophils 0.0      Absolute Immature Granulocytes 0.1      Absolute NRBCs  0.0     HCG QUALITATIVE URINE - Normal    hCG Urine Qualitative Negative         Imaging   CT Abdomen Pelvis w Contrast   Final Result   IMPRESSION:    Two separate acute processes:   1.  Acute diverticulitis of the proximal descending colon without abscess or perforation.   2.  Acute epiploic appendagitis of the mid sigmoid colon.          ED Course      Medications Administered   Medications   morphine (PF) injection 4 mg (4 mg Intravenous $Given 2/15/25 0235)   CT Scan Flush (65 mLs Intravenous $Given 2/15/25 0138)   iopamidol (ISOVUE-370) solution 500 mL (100 mLs Intravenous $Given 2/15/25 0138)   ampicillin-sulbactam (UNASYN) 3 g vial to attach to  mL bag (3 g Intravenous $New Bag 2/15/25 0235)         Medical Decision Making / Diagnosis     NICOLE Buenrostro is a 34 year old female who presents with left sided abdominal pain that radiates into the left groin.  Patient has history of diverticulitis but states this feels different.  A broad differential diagnosis was considered including urinary obstruction, colitis, appendicitis, intestinal cramping, aortic pathologies, pyelonephritis, ureterolithiasis, UTI, constipation, diverticulitis, obstruction, ileus, volvulus, etc as possibilities. CT confirms diverticulitis without abscess or perforation.  Surprisingly, patient was also found to have epiploic appendagitis as well.  This also could be contributing to her pain. Her pain has been controlled in the emergency department. On recheck, she has a non-surgical exam, pain is controlled, and she is tolerating POs.  I discussed indications for admission versus discharge and together we opted for outpatient management.  I will prescribe augmentin and supportive medications as per below.  We discussed the natural history of this problem, and I discussed dietary precautions. I recommended she return to the ED for worsening pain, fever, vomiting, bloody or black stools, or for any other concerning  symptoms. I recommended she follow up with her primary care physician in 2-3 days. All question were answered and the patient is amenable for discharge at this time.      Disposition   The patient was discharged.     Diagnosis     ICD-10-CM    1. Diverticulitis of colon  K57.32       2. Epiploic appendagitis  K63.89            MD Yesenia Randall, Jorge Hatch MD  02/15/25 0305

## 2025-02-15 NOTE — Clinical Note
Karen Buenrostro was seen and treated in our emergency department on 2/14/2025.  She may return to work on 02/17/2025.       If you have any questions or concerns, please don't hesitate to call.      Jorge Patterson MD

## 2025-02-15 NOTE — ED TRIAGE NOTES
Yesterday pain started to left flank that wraps around abd into groin. Pain getting worse today. Hx of diverticulitis but this feels different. Denies NV. Denies blood in urine.

## 2025-05-27 ENCOUNTER — APPOINTMENT (OUTPATIENT)
Dept: GENERAL RADIOLOGY | Facility: CLINIC | Age: 35
End: 2025-05-27
Attending: EMERGENCY MEDICINE
Payer: COMMERCIAL

## 2025-05-27 ENCOUNTER — HOSPITAL ENCOUNTER (OUTPATIENT)
Facility: CLINIC | Age: 35
Setting detail: OBSERVATION
Discharge: HOME | End: 2025-05-28
Attending: EMERGENCY MEDICINE | Admitting: INTERNAL MEDICINE
Payer: COMMERCIAL

## 2025-05-27 DIAGNOSIS — K29.81 GASTROINTESTINAL HEMORRHAGE ASSOCIATED WITH DUODENITIS: Primary | ICD-10-CM

## 2025-05-27 DIAGNOSIS — A41.9 SEPSIS, DUE TO UNSPECIFIED ORGANISM, UNSPECIFIED WHETHER ACUTE ORGAN DYSFUNCTION PRESENT (H): ICD-10-CM

## 2025-05-27 DIAGNOSIS — J45.21 MILD INTERMITTENT ASTHMA WITH EXACERBATION: ICD-10-CM

## 2025-05-27 DIAGNOSIS — R07.9 CHEST PAIN, UNSPECIFIED TYPE: ICD-10-CM

## 2025-05-27 DIAGNOSIS — K29.80 DUODENITIS: ICD-10-CM

## 2025-05-27 LAB
ANION GAP SERPL CALCULATED.3IONS-SCNC: 16 MMOL/L (ref 7–15)
BASOPHILS # BLD AUTO: 0 10E3/UL (ref 0–0.2)
BASOPHILS NFR BLD AUTO: 0 %
BUN SERPL-MCNC: 11 MG/DL (ref 6–20)
CALCIUM SERPL-MCNC: 9.3 MG/DL (ref 8.8–10.4)
CHLORIDE SERPL-SCNC: 103 MMOL/L (ref 98–107)
CREAT SERPL-MCNC: 0.77 MG/DL (ref 0.51–0.95)
D DIMER PPP FEU-MCNC: <0.27 UG/ML FEU (ref 0–0.5)
EGFRCR SERPLBLD CKD-EPI 2021: >90 ML/MIN/1.73M2
EOSINOPHIL # BLD AUTO: 0.2 10E3/UL (ref 0–0.7)
EOSINOPHIL NFR BLD AUTO: 2 %
ERYTHROCYTE [DISTWIDTH] IN BLOOD BY AUTOMATED COUNT: 13.1 % (ref 10–15)
FLUAV RNA SPEC QL NAA+PROBE: NEGATIVE
FLUBV RNA RESP QL NAA+PROBE: NEGATIVE
GLUCOSE SERPL-MCNC: 123 MG/DL (ref 70–99)
HCO3 SERPL-SCNC: 21 MMOL/L (ref 22–29)
HCT VFR BLD AUTO: 41.8 % (ref 35–47)
HGB BLD-MCNC: 14.4 G/DL (ref 11.7–15.7)
HOLD SPECIMEN: NORMAL
HOLD SPECIMEN: NORMAL
IMM GRANULOCYTES # BLD: 0.1 10E3/UL
IMM GRANULOCYTES NFR BLD: 1 %
LYMPHOCYTES # BLD AUTO: 4.2 10E3/UL (ref 0.8–5.3)
LYMPHOCYTES NFR BLD AUTO: 36 %
MCH RBC QN AUTO: 29.3 PG (ref 26.5–33)
MCHC RBC AUTO-ENTMCNC: 34.4 G/DL (ref 31.5–36.5)
MCV RBC AUTO: 85 FL (ref 78–100)
MONOCYTES # BLD AUTO: 0.8 10E3/UL (ref 0–1.3)
MONOCYTES NFR BLD AUTO: 7 %
NEUTROPHILS # BLD AUTO: 6.3 10E3/UL (ref 1.6–8.3)
NEUTROPHILS NFR BLD AUTO: 55 %
NRBC # BLD AUTO: 0 10E3/UL
NRBC BLD AUTO-RTO: 0 /100
PLATELET # BLD AUTO: 296 10E3/UL (ref 150–450)
POTASSIUM SERPL-SCNC: 3.5 MMOL/L (ref 3.4–5.3)
RBC # BLD AUTO: 4.91 10E6/UL (ref 3.8–5.2)
RSV RNA SPEC NAA+PROBE: NEGATIVE
SARS-COV-2 RNA RESP QL NAA+PROBE: NEGATIVE
SODIUM SERPL-SCNC: 140 MMOL/L (ref 135–145)
TROPONIN T SERPL HS-MCNC: <6 NG/L
WBC # BLD AUTO: 11.5 10E3/UL (ref 4–11)

## 2025-05-27 PROCEDURE — 84145 PROCALCITONIN (PCT): CPT | Performed by: INTERNAL MEDICINE

## 2025-05-27 PROCEDURE — 250N000009 HC RX 250: Performed by: EMERGENCY MEDICINE

## 2025-05-27 PROCEDURE — 258N000003 HC RX IP 258 OP 636: Performed by: EMERGENCY MEDICINE

## 2025-05-27 PROCEDURE — 85379 FIBRIN DEGRADATION QUANT: CPT | Performed by: EMERGENCY MEDICINE

## 2025-05-27 PROCEDURE — 36415 COLL VENOUS BLD VENIPUNCTURE: CPT | Performed by: EMERGENCY MEDICINE

## 2025-05-27 PROCEDURE — 87040 BLOOD CULTURE FOR BACTERIA: CPT | Performed by: EMERGENCY MEDICINE

## 2025-05-27 PROCEDURE — 84703 CHORIONIC GONADOTROPIN ASSAY: CPT | Performed by: EMERGENCY MEDICINE

## 2025-05-27 PROCEDURE — 83605 ASSAY OF LACTIC ACID: CPT | Performed by: EMERGENCY MEDICINE

## 2025-05-27 PROCEDURE — 96361 HYDRATE IV INFUSION ADD-ON: CPT

## 2025-05-27 PROCEDURE — 84484 ASSAY OF TROPONIN QUANT: CPT | Performed by: EMERGENCY MEDICINE

## 2025-05-27 PROCEDURE — 87637 SARSCOV2&INF A&B&RSV AMP PRB: CPT | Performed by: EMERGENCY MEDICINE

## 2025-05-27 PROCEDURE — 71046 X-RAY EXAM CHEST 2 VIEWS: CPT

## 2025-05-27 PROCEDURE — 94640 AIRWAY INHALATION TREATMENT: CPT

## 2025-05-27 PROCEDURE — 99285 EMERGENCY DEPT VISIT HI MDM: CPT | Mod: 25

## 2025-05-27 PROCEDURE — 250N000012 HC RX MED GY IP 250 OP 636 PS 637: Performed by: EMERGENCY MEDICINE

## 2025-05-27 PROCEDURE — 82248 BILIRUBIN DIRECT: CPT | Performed by: INTERNAL MEDICINE

## 2025-05-27 PROCEDURE — 85014 HEMATOCRIT: CPT | Performed by: EMERGENCY MEDICINE

## 2025-05-27 PROCEDURE — 80048 BASIC METABOLIC PNL TOTAL CA: CPT | Performed by: EMERGENCY MEDICINE

## 2025-05-27 PROCEDURE — 93005 ELECTROCARDIOGRAM TRACING: CPT

## 2025-05-27 RX ORDER — KETOROLAC TROMETHAMINE 15 MG/ML
15 INJECTION, SOLUTION INTRAMUSCULAR; INTRAVENOUS ONCE
Status: COMPLETED | OUTPATIENT
Start: 2025-05-28 | End: 2025-05-28

## 2025-05-27 RX ORDER — PREDNISONE 20 MG/1
60 TABLET ORAL ONCE
Status: COMPLETED | OUTPATIENT
Start: 2025-05-27 | End: 2025-05-27

## 2025-05-27 RX ORDER — IPRATROPIUM BROMIDE AND ALBUTEROL SULFATE 2.5; .5 MG/3ML; MG/3ML
3 SOLUTION RESPIRATORY (INHALATION)
Status: DISCONTINUED | OUTPATIENT
Start: 2025-05-27 | End: 2025-05-28

## 2025-05-27 RX ADMIN — SODIUM CHLORIDE 1000 ML: 0.9 INJECTION, SOLUTION INTRAVENOUS at 22:41

## 2025-05-27 RX ADMIN — IPRATROPIUM BROMIDE AND ALBUTEROL SULFATE 3 ML: .5; 3 SOLUTION RESPIRATORY (INHALATION) at 22:27

## 2025-05-27 RX ADMIN — PREDNISONE 60 MG: 20 TABLET ORAL at 22:26

## 2025-05-27 ASSESSMENT — ACTIVITIES OF DAILY LIVING (ADL)
ADLS_ACUITY_SCORE: 41
ADLS_ACUITY_SCORE: 41

## 2025-05-28 VITALS
RESPIRATION RATE: 17 BRPM | BODY MASS INDEX: 44.41 KG/M2 | OXYGEN SATURATION: 94 % | SYSTOLIC BLOOD PRESSURE: 119 MMHG | TEMPERATURE: 98.2 F | DIASTOLIC BLOOD PRESSURE: 71 MMHG | HEART RATE: 99 BPM | WEIGHT: 293 LBS | HEIGHT: 68 IN

## 2025-05-28 PROBLEM — J45.21 MILD INTERMITTENT ASTHMA WITH EXACERBATION: Status: ACTIVE | Noted: 2025-05-28

## 2025-05-28 PROBLEM — R07.9 CHEST PAIN, UNSPECIFIED TYPE: Status: ACTIVE | Noted: 2025-05-28

## 2025-05-28 PROBLEM — A41.9 SEPSIS, DUE TO UNSPECIFIED ORGANISM, UNSPECIFIED WHETHER ACUTE ORGAN DYSFUNCTION PRESENT (H): Status: ACTIVE | Noted: 2025-05-28

## 2025-05-28 LAB
ALBUMIN SERPL BCG-MCNC: 4.3 G/DL (ref 3.5–5.2)
ALP SERPL-CCNC: 136 U/L (ref 40–150)
ALT SERPL W P-5'-P-CCNC: 40 U/L (ref 0–50)
AST SERPL W P-5'-P-CCNC: 33 U/L (ref 0–45)
ATRIAL RATE - MUSE: 106 BPM
BASE EXCESS BLDV CALC-SCNC: -5.8 MMOL/L (ref -3–3)
BILIRUB DIRECT SERPL-MCNC: <0.08 MG/DL (ref 0–0.3)
BILIRUB SERPL-MCNC: 0.2 MG/DL
DIASTOLIC BLOOD PRESSURE - MUSE: NORMAL MMHG
HCG SERPL QL: NEGATIVE
HCO3 BLDV-SCNC: 19 MMOL/L (ref 21–28)
INTERPRETATION ECG - MUSE: NORMAL
LACTATE SERPL-SCNC: 3.4 MMOL/L (ref 0.7–2)
LACTATE SERPL-SCNC: 3.9 MMOL/L (ref 0.7–2)
LACTATE SERPL-SCNC: 5.4 MMOL/L (ref 0.7–2)
LVEF ECHO: NORMAL
O2/TOTAL GAS SETTING VFR VENT: 0 %
OXYHGB MFR BLDV: 91 % (ref 70–75)
P AXIS - MUSE: 38 DEGREES
PCO2 BLDV: 34 MM HG (ref 40–50)
PH BLDV: 7.36 [PH] (ref 7.32–7.43)
PO2 BLDV: 64 MM HG (ref 25–47)
PR INTERVAL - MUSE: 148 MS
PROCALCITONIN SERPL IA-MCNC: 0.04 NG/ML
PROT SERPL-MCNC: 7.2 G/DL (ref 6.4–8.3)
QRS DURATION - MUSE: 82 MS
QT - MUSE: 376 MS
QTC - MUSE: 499 MS
R AXIS - MUSE: 47 DEGREES
SAO2 % BLDV: 92.6 % (ref 70–75)
SYSTOLIC BLOOD PRESSURE - MUSE: NORMAL MMHG
T AXIS - MUSE: -10 DEGREES
TROPONIN T SERPL HS-MCNC: <6 NG/L
VENTRICULAR RATE- MUSE: 106 BPM

## 2025-05-28 PROCEDURE — 250N000013 HC RX MED GY IP 250 OP 250 PS 637: Performed by: EMERGENCY MEDICINE

## 2025-05-28 PROCEDURE — 96365 THER/PROPH/DIAG IV INF INIT: CPT

## 2025-05-28 PROCEDURE — 250N000009 HC RX 250: Performed by: EMERGENCY MEDICINE

## 2025-05-28 PROCEDURE — 83605 ASSAY OF LACTIC ACID: CPT | Performed by: INTERNAL MEDICINE

## 2025-05-28 PROCEDURE — 96361 HYDRATE IV INFUSION ADD-ON: CPT

## 2025-05-28 PROCEDURE — 250N000011 HC RX IP 250 OP 636: Performed by: EMERGENCY MEDICINE

## 2025-05-28 PROCEDURE — 250N000009 HC RX 250: Performed by: INTERNAL MEDICINE

## 2025-05-28 PROCEDURE — 83605 ASSAY OF LACTIC ACID: CPT | Performed by: EMERGENCY MEDICINE

## 2025-05-28 PROCEDURE — 96375 TX/PRO/DX INJ NEW DRUG ADDON: CPT

## 2025-05-28 PROCEDURE — G0378 HOSPITAL OBSERVATION PER HR: HCPCS

## 2025-05-28 PROCEDURE — 250N000012 HC RX MED GY IP 250 OP 636 PS 637: Performed by: INTERNAL MEDICINE

## 2025-05-28 PROCEDURE — 36415 COLL VENOUS BLD VENIPUNCTURE: CPT | Performed by: INTERNAL MEDICINE

## 2025-05-28 PROCEDURE — 36415 COLL VENOUS BLD VENIPUNCTURE: CPT | Performed by: EMERGENCY MEDICINE

## 2025-05-28 PROCEDURE — 258N000003 HC RX IP 258 OP 636: Performed by: EMERGENCY MEDICINE

## 2025-05-28 PROCEDURE — 82805 BLOOD GASES W/O2 SATURATION: CPT | Performed by: INTERNAL MEDICINE

## 2025-05-28 PROCEDURE — 84484 ASSAY OF TROPONIN QUANT: CPT | Performed by: INTERNAL MEDICINE

## 2025-05-28 PROCEDURE — 250N000013 HC RX MED GY IP 250 OP 250 PS 637: Performed by: INTERNAL MEDICINE

## 2025-05-28 RX ORDER — ACETAMINOPHEN AND CODEINE PHOSPHATE 120; 12 MG/5ML; MG/5ML
1 SOLUTION ORAL DAILY
COMMUNITY
Start: 2024-03-29

## 2025-05-28 RX ORDER — GUAIFENESIN 200 MG/10ML
200 LIQUID ORAL EVERY 4 HOURS PRN
Status: DISCONTINUED | OUTPATIENT
Start: 2025-05-28 | End: 2025-05-28 | Stop reason: HOSPADM

## 2025-05-28 RX ORDER — IBUPROFEN 400 MG/1
400 TABLET, FILM COATED ORAL EVERY 6 HOURS PRN
Status: DISCONTINUED | OUTPATIENT
Start: 2025-05-28 | End: 2025-05-28 | Stop reason: HOSPADM

## 2025-05-28 RX ORDER — PREDNISONE 20 MG/1
40 TABLET ORAL DAILY
Qty: 6 TABLET | Refills: 0 | Status: SHIPPED | OUTPATIENT
Start: 2025-05-29 | End: 2025-06-01

## 2025-05-28 RX ORDER — LORAZEPAM 0.5 MG/1
0.5 TABLET ORAL EVERY 4 HOURS PRN
Status: DISCONTINUED | OUTPATIENT
Start: 2025-05-28 | End: 2025-05-28 | Stop reason: HOSPADM

## 2025-05-28 RX ORDER — IOPAMIDOL 755 MG/ML
500 INJECTION, SOLUTION INTRAVASCULAR ONCE
Status: COMPLETED | OUTPATIENT
Start: 2025-05-28 | End: 2025-05-28

## 2025-05-28 RX ORDER — AMOXICILLIN 250 MG
1 CAPSULE ORAL 2 TIMES DAILY PRN
Status: DISCONTINUED | OUTPATIENT
Start: 2025-05-28 | End: 2025-05-28 | Stop reason: HOSPADM

## 2025-05-28 RX ORDER — AZITHROMYCIN 250 MG/1
500 TABLET, FILM COATED ORAL ONCE
Status: COMPLETED | OUTPATIENT
Start: 2025-05-28 | End: 2025-05-28

## 2025-05-28 RX ORDER — BENZONATATE 100 MG/1
100 CAPSULE ORAL 3 TIMES DAILY PRN
Status: DISCONTINUED | OUTPATIENT
Start: 2025-05-28 | End: 2025-05-28 | Stop reason: HOSPADM

## 2025-05-28 RX ORDER — MAGNESIUM HYDROXIDE/ALUMINUM HYDROXICE/SIMETHICONE 120; 1200; 1200 MG/30ML; MG/30ML; MG/30ML
30 SUSPENSION ORAL EVERY 4 HOURS PRN
Status: DISCONTINUED | OUTPATIENT
Start: 2025-05-28 | End: 2025-05-28 | Stop reason: HOSPADM

## 2025-05-28 RX ORDER — OMEPRAZOLE 20 MG/1
20 CAPSULE, DELAYED RELEASE ORAL DAILY PRN
COMMUNITY

## 2025-05-28 RX ORDER — POLYETHYLENE GLYCOL 3350 17 G/17G
17 POWDER, FOR SOLUTION ORAL 2 TIMES DAILY PRN
Status: DISCONTINUED | OUTPATIENT
Start: 2025-05-28 | End: 2025-05-28 | Stop reason: HOSPADM

## 2025-05-28 RX ORDER — AZITHROMYCIN 250 MG/1
250 TABLET, FILM COATED ORAL EVERY EVENING
Status: DISCONTINUED | OUTPATIENT
Start: 2025-05-28 | End: 2025-05-28 | Stop reason: HOSPADM

## 2025-05-28 RX ORDER — DIPHENHYDRAMINE HCL 25 MG
25 TABLET ORAL DAILY PRN
COMMUNITY

## 2025-05-28 RX ORDER — PROCHLORPERAZINE MALEATE 5 MG/1
10 TABLET ORAL EVERY 6 HOURS PRN
Status: DISCONTINUED | OUTPATIENT
Start: 2025-05-28 | End: 2025-05-28 | Stop reason: HOSPADM

## 2025-05-28 RX ORDER — PANTOPRAZOLE SODIUM 40 MG/1
40 TABLET, DELAYED RELEASE ORAL
Qty: 3 TABLET | Refills: 0 | Status: SHIPPED | OUTPATIENT
Start: 2025-05-29

## 2025-05-28 RX ORDER — ACETAMINOPHEN 325 MG/1
650 TABLET ORAL EVERY 4 HOURS PRN
Status: DISCONTINUED | OUTPATIENT
Start: 2025-05-28 | End: 2025-05-28 | Stop reason: HOSPADM

## 2025-05-28 RX ORDER — PANTOPRAZOLE SODIUM 40 MG/1
40 TABLET, DELAYED RELEASE ORAL
Status: DISCONTINUED | OUTPATIENT
Start: 2025-05-28 | End: 2025-05-28 | Stop reason: HOSPADM

## 2025-05-28 RX ORDER — ALBUTEROL SULFATE 90 UG/1
2 INHALANT RESPIRATORY (INHALATION)
Status: DISCONTINUED | OUTPATIENT
Start: 2025-05-28 | End: 2025-05-28 | Stop reason: HOSPADM

## 2025-05-28 RX ORDER — ACETAMINOPHEN 650 MG/1
650 SUPPOSITORY RECTAL EVERY 4 HOURS PRN
Status: DISCONTINUED | OUTPATIENT
Start: 2025-05-28 | End: 2025-05-28 | Stop reason: HOSPADM

## 2025-05-28 RX ORDER — AZITHROMYCIN 250 MG/1
250 TABLET, FILM COATED ORAL EVERY EVENING
Qty: 4 TABLET | Refills: 0 | Status: SHIPPED | OUTPATIENT
Start: 2025-05-28

## 2025-05-28 RX ORDER — CALCIUM CARBONATE 500 MG/1
1000 TABLET, CHEWABLE ORAL 3 TIMES DAILY PRN
Status: DISCONTINUED | OUTPATIENT
Start: 2025-05-28 | End: 2025-05-28 | Stop reason: HOSPADM

## 2025-05-28 RX ORDER — GUAIFENESIN 200 MG/10ML
200 LIQUID ORAL EVERY 4 HOURS PRN
COMMUNITY
Start: 2025-05-28

## 2025-05-28 RX ORDER — ALBUTEROL SULFATE 90 UG/1
2 INHALANT RESPIRATORY (INHALATION)
Qty: 17 G | Refills: 0 | Status: SHIPPED | OUTPATIENT
Start: 2025-05-28

## 2025-05-28 RX ORDER — CEFTRIAXONE 2 G/1
2 INJECTION, POWDER, FOR SOLUTION INTRAMUSCULAR; INTRAVENOUS ONCE
Status: COMPLETED | OUTPATIENT
Start: 2025-05-28 | End: 2025-05-28

## 2025-05-28 RX ORDER — IPRATROPIUM BROMIDE AND ALBUTEROL SULFATE 2.5; .5 MG/3ML; MG/3ML
3 SOLUTION RESPIRATORY (INHALATION) EVERY 4 HOURS PRN
Status: DISCONTINUED | OUTPATIENT
Start: 2025-05-28 | End: 2025-05-28 | Stop reason: HOSPADM

## 2025-05-28 RX ORDER — PREDNISONE 20 MG/1
40 TABLET ORAL DAILY
Status: DISCONTINUED | OUTPATIENT
Start: 2025-05-28 | End: 2025-05-28 | Stop reason: HOSPADM

## 2025-05-28 RX ORDER — AMOXICILLIN 250 MG
2 CAPSULE ORAL 2 TIMES DAILY PRN
Status: DISCONTINUED | OUTPATIENT
Start: 2025-05-28 | End: 2025-05-28 | Stop reason: HOSPADM

## 2025-05-28 RX ORDER — ONDANSETRON 4 MG/1
4 TABLET, ORALLY DISINTEGRATING ORAL EVERY 6 HOURS PRN
Status: DISCONTINUED | OUTPATIENT
Start: 2025-05-28 | End: 2025-05-28 | Stop reason: HOSPADM

## 2025-05-28 RX ORDER — ACETAMINOPHEN AND CODEINE PHOSPHATE 120; 12 MG/5ML; MG/5ML
0.35 SOLUTION ORAL DAILY
Status: DISCONTINUED | OUTPATIENT
Start: 2025-05-28 | End: 2025-05-28 | Stop reason: HOSPADM

## 2025-05-28 RX ORDER — ONDANSETRON 2 MG/ML
4 INJECTION INTRAMUSCULAR; INTRAVENOUS EVERY 6 HOURS PRN
Status: DISCONTINUED | OUTPATIENT
Start: 2025-05-28 | End: 2025-05-28 | Stop reason: HOSPADM

## 2025-05-28 RX ADMIN — KETOROLAC TROMETHAMINE 15 MG: 15 INJECTION, SOLUTION INTRAMUSCULAR; INTRAVENOUS at 00:01

## 2025-05-28 RX ADMIN — SODIUM CHLORIDE 90 ML: 9 INJECTION, SOLUTION INTRAVENOUS at 00:42

## 2025-05-28 RX ADMIN — ACETAMINOPHEN 650 MG: 325 TABLET, FILM COATED ORAL at 04:25

## 2025-05-28 RX ADMIN — SODIUM CHLORIDE 1000 ML: 0.9 INJECTION, SOLUTION INTRAVENOUS at 00:34

## 2025-05-28 RX ADMIN — PANTOPRAZOLE SODIUM 40 MG: 40 TABLET, DELAYED RELEASE ORAL at 08:19

## 2025-05-28 RX ADMIN — IOPAMIDOL 85 ML: 755 INJECTION, SOLUTION INTRAVENOUS at 00:42

## 2025-05-28 RX ADMIN — PREDNISONE 40 MG: 20 TABLET ORAL at 08:18

## 2025-05-28 RX ADMIN — CEFTRIAXONE 2 G: 2 INJECTION, POWDER, FOR SOLUTION INTRAMUSCULAR; INTRAVENOUS at 02:10

## 2025-05-28 RX ADMIN — AZITHROMYCIN DIHYDRATE 500 MG: 250 TABLET ORAL at 02:19

## 2025-05-28 RX ADMIN — IPRATROPIUM BROMIDE AND ALBUTEROL SULFATE 3 ML: .5; 3 SOLUTION RESPIRATORY (INHALATION) at 14:34

## 2025-05-28 ASSESSMENT — ACTIVITIES OF DAILY LIVING (ADL)
ADLS_ACUITY_SCORE: 37
ADLS_ACUITY_SCORE: 41
ADLS_ACUITY_SCORE: 37
ADLS_ACUITY_SCORE: 37
ADLS_ACUITY_SCORE: 41
ADLS_ACUITY_SCORE: 37
ADLS_ACUITY_SCORE: 37
ADLS_ACUITY_SCORE: 43
ADLS_ACUITY_SCORE: 37
ADLS_ACUITY_SCORE: 41
ADLS_ACUITY_SCORE: 37
ADLS_ACUITY_SCORE: 43
ADLS_ACUITY_SCORE: 37
ADLS_ACUITY_SCORE: 41

## 2025-05-28 NOTE — DISCHARGE SUMMARY
[facility]    Discharge Summary  Hospitalist    Date of Admission:  5/27/2025  Date of Discharge:  5/28/2025  Provider:  Randa Beyer PA-C  Date of Service (when I last saw the patient): 05/28/25    Discharge Diagnoses   ***    Other medical issues:  Past Medical History:   Diagnosis Date    Anxiety     Diverticulitis     s/p partial sigmoidectomy 4/17/25    Duodenitis determined by biopsy 2013    thought due to excedrin use    Fatty liver disease, nonalcoholic     Morbid obesity (H)     Post concussion syndrome 2011    Uncomplicated asthma couple years ago     History of Present Illness   Karen Buenrostro is a 34 year old female with PMH including morbid obesity, asthma, anxiety, migraine, hepatic steatosis, and diverticulitis with recent partial sigmoidectomy 4/17/2025 who presents with chest pain and shortness of breath.  She has had chest tightness and shortness of breath for the past week along with increased cough from baseline productive of white sputum which is suspected secondary to viral infection resulting in asthma exacerbation.  Has not been using her controller inhaler.  For the last 2 to 3 days she has had intermittent sharp  left-sided chest pain that lasted few minutes.  This chest pain can start at rest or with exertion and she has never had it before.  Denies any fevers or chills, but she has had some hot flashes, palpitations, slight nausea without vomiting.  Denies significant acid reflux symptoms.  No lower extremity edema or swelling.  Denies dysuria, but cannot say if she has hematuria or not as she is just finishing her menstrual cycle.  She does not smoke or vape.     History obtained from patient, medical record, and from Dr. Patterson in the emergency department.  Blood pressure 134/82.  Initially tachycardic at 132 and tachypneic with respiratory rate 33.  Temperature 97.9  F, oxygen % on room air.  Initial labs show leukocytosis 11.5, hemoglobin 14.4,  platelet count 296.  Bicarb 21 otherwise BMP unremarkable.  Glucose 123.  Serum hCG negative.  Troponin T <6.  D-dimer <0.27.  COVID-19, influenza A/B, RSV PCR negative.  EKG shows sinus tachycardia with some lateral T wave inversions in leads V3-V6.  Chest x-ray unremarkable.  CT chest/ab/pelvis shows diffuse tabetic steatosis and previous partial sigmoidectomy with diverticulosis and no diverticulitis, but no DVT or infiltrate.  Lactic acid was elevated at 5.4 with repeat down to 3.4.  She did receive empiric IV ceftriaxone and azithromycin p.o.  She also received 2 L NS and 60 mg prednisone.  Admit observation with repeat labs in the morning and TTE. Please see the admission history and physical for full details.    Hospital Course   Karen Buenrostro was admitted on 5/27/2025.  The following problems were addressed during her hospitalization:  ***  Atypical chest pain  Suspect viral URI  Asthmatic exacerbation  Elevated lactic acid: She has had chest tightness and shortness of breath for the past week along with increased cough from baseline productive of white sputum which is suspected secondary to viral infection resulting in asthma exacerbation.  Has not been using her controller inhaler.  For the last 2 to 3 days she has had intermittent sharp  left-sided chest pain that lasted few minutes.  This chest pain can start at rest or with exertion and she has never had it before.  Denies any fevers or chills, but she has had some hot flashes, palpitations, slight nausea without vomiting.  Denies significant acid reflux symptoms.  No lower extremity edema or swelling.  Denies dysuria, but cannot say if she has hematuria or not as she is just finishing her menstrual cycle.  She does not smoke or vape.  In the ER she was initially tachycardic to 130 and hyperventilating with respiratory rate in the 30s.  O2 sats % on room air and afebrile.  Initial labs show slight leukocytosis at 11.5, undetectable troponin,  undetectable D-dimer, negative COVID-19, influenza A/B, RSV PCR.  Lactic acid was elevated at 5.4 with repeat down to 3.4 having received 2 L NS.  She did receive a DuoNeb, prednisone 60 mg, and empiric IV ceftriaxone and azithromycin in case of sepsis.  CT chest/ab/pelvis did not show any PE or pulmonary infiltrate, there was diffuse hepatic steatosis which is not new, and previous partial sigmoidectomy with diverticulosis without diverticulitis.  I suspect she has asthma exacerbation from a viral infection and the chest pain is likely musculoskeletal or pleuritic secondary to coughing.  Pericarditis is in the differential and she says she has had this before although I do not have documentation for this.  Troponin is negative so she does not have myocarditis.  -Prednisone 40 mg daily  -DuoNebs and albuterol inhaler as needed, she needs a albuterol refill on discharge  -Cardiac monitoring overnight due to palpitations and initial tachycardia  -Obtain TTE for completeness and to eval for pericarditis/effusion  -Recheck troponin T, VBG, lactic acid with morning lab draw  -Acetaminophen for mild pain and ibuprofen 400 mg every 6 hours as needed for inflammatory pain  -Start oral pantoprazole 40 mg daily for GI protection given history of duodenitis now with some prednisone and ibuprofen use and possible treatment of GERD  -As needed Tums and Maalox for recurrence of chest pain  -Z-Sky  - Tessalon Perles and guaifenesin as needed for cough     History diverticulitis: Underwent partial sigmoidectomy 4/17/2025.  Incision sites do not have any sign of infection.  No postop complication seen on CT abdomen pelvis here.     Morbid obesity: BMI 48.2.     Hepatic steatosis: Seen on CT, not a new issue.     Resume PTA daily norethindrone OCP    Pending Results   Unresulted Labs Ordered in the Past 30 Days of this Admission       Date and Time Order Name Status Description    5/27/2025 10:18 PM Blood Culture Peripheral blood  "(BC) Hand, Left Preliminary     5/27/2025 10:18 PM Blood Culture Peripheral blood (BC) Arm, Right Preliminary           Code Status   Full Code       Primary Care Physician   [facility]    Exam:    /68 (BP Location: Left arm)   Pulse 64   Temp 98.2  F (36.8  C) (Oral)   Resp 16   Ht 1.727 m (5' 7.99\")   Wt (!) 146.6 kg (323 lb 4.8 oz)   SpO2 95%   BMI 49.17 kg/m    General: laying in bed, pleasant, A&Ox3, NAD  Lungs: CTAB without wheezing or crackles, able to speak in full sentences without accessory muscle use  CV: RRR without murmur or rub   Abdomen: soft, nontender, nondistended, normoactive bowel sounds, recent incision sites appear to be well healed    Skin: warm, dry  Ext: no LE edema     Discharge Disposition   Discharged to home    Consultations This Hospital Stay   None    Time Spent on this Encounter   I, Ashley Beyer PA-C, personally saw the patient today and spent greater than 30 minutes discharging this patient.***    Discharge Orders      Reason for your hospital stay    You were admitted for concerns of shortness of breath and cough related to an asthma exacerbation. You were given steroids and nebulizer treatments with significant improvement in your symptoms. Chest x ray was negative for pneumonia so you were not started on antibiotics. You were doing today and able to walk without significant symptoms so we were comfortable sending you home. We would like you to continue with Prednisone daily in addition to as needed albuterol inhaler. You should continue to take Azithromycin for bronchitis for 4 more days. You should take protonix daily while on oral steroids for GI protection.     Activity    Your activity upon discharge: activity as tolerated     Diet    Follow this diet upon discharge: Current Diet:Orders Placed This Encounter      Regular Diet Adult     Hospital Follow-up with Existing Primary Care Provider (PCP)          Discharge Medications   Current " Discharge Medication List        START taking these medications    Details   azithromycin (ZITHROMAX) 250 MG tablet Take 1 tablet (250 mg) by mouth every evening.  Qty: 4 tablet, Refills: 0    Associated Diagnoses: Mild intermittent asthma with exacerbation      guaiFENesin (ROBITUSSIN) 20 mg/mL liquid Take 10 mLs (200 mg) by mouth every 4 hours as needed for cough.    Associated Diagnoses: Mild intermittent asthma with exacerbation      pantoprazole (PROTONIX) 40 MG EC tablet Take 1 tablet (40 mg) by mouth every morning (before breakfast). Take while on prednisone for GI protection  Qty: 3 tablet, Refills: 0    Associated Diagnoses: Duodenitis      predniSONE (DELTASONE) 20 MG tablet Take 2 tablets (40 mg) by mouth daily for 3 days.  Qty: 6 tablet, Refills: 0    Associated Diagnoses: Mild intermittent asthma with exacerbation           CONTINUE these medications which have CHANGED    Details   albuterol (PROAIR HFA/PROVENTIL HFA/VENTOLIN HFA) 108 (90 Base) MCG/ACT inhaler Inhale 2 puffs into the lungs every 2 hours as needed for wheezing.  Qty: 17 g, Refills: 0    Comments: Pharmacy may dispense brand covered by insurance (Proair, or proventil or ventolin or generic albuterol inhaler)  Associated Diagnoses: Mild intermittent asthma with exacerbation           CONTINUE these medications which have NOT CHANGED    Details   diphenhydrAMINE (BENADRYL) 25 MG tablet Take 25 mg by mouth daily as needed for allergies.      norethindrone (MICRONOR) 0.35 MG tablet Take 1 tablet by mouth daily.      omeprazole (PRILOSEC) 20 MG DR capsule Take 20 mg by mouth daily as needed.      ondansetron (ZOFRAN ODT) 4 MG ODT tab Take 1 tablet (4 mg) by mouth every 8 hours as needed for nausea  Qty: 30 tablet, Refills: 0           Allergies   Allergies   Allergen Reactions    Hydroxyzine Rash     Data   Results for orders placed or performed during the hospital encounter of 05/27/25   XR Chest 2 Views     Status: None    Narrative    EXAM:  XR CHEST 2 VIEWS  LOCATION: [facility]  DATE: 5/27/2025    INDICATION: chest pain and SOB  COMPARISON: 6/15/2024      Impression    IMPRESSION: The lungs are clear. There is no pleural effusion or pneumothorax. The cardiomediastinal silhouette is normal. The limited visualized portions of the upper abdomen are grossly normal.   CT Chest (PE) Abdomen Pelvis w Contrast     Status: None    Narrative    EXAM: CT CHEST PE ABDOMEN PELVIS W CONTRAST  LOCATION: [facility]  DATE: 5/28/2025    INDICATION: elevated lactic, sob, chest pain, status post partial colectomy.  COMPARISON: Prior day 2 view chest radiograph, CT abdomen/pelvis with contrast 02/15/2025  TECHNIQUE: CT chest pulmonary angiogram and routine CT abdomen pelvis with IV contrast. Arterial phase through the chest and venous phase through the abdomen and pelvis. Multiplanar reformats and MIP reconstructions were performed. Dose reduction   techniques were used.   CONTRAST: 85mL Isovue 370    FINDINGS:  ANGIOGRAM CHEST: Pulmonary arteries are normal caliber and negative for pulmonary emboli. Thoracic aorta is negative for dissection. No CT evidence of right heart strain.     LUNGS AND PLEURA: Normal.    MEDIASTINUM/AXILLAE: Normal.    CORONARY ARTERY CALCIFICATION: None.    HEPATOBILIARY: Diffuse hepatic steatosis. Cholecystectomy.    PANCREAS: Normal.    SPLEEN: Normal.    ADRENAL GLANDS: Normal.    KIDNEYS/BLADDER: Normal.    BOWEL: Colonic diverticulosis. Partial sigmoidectomy. No bowel obstruction.    LYMPH NODES: Normal.    VASCULATURE: Normal.    PELVIC ORGANS: Unremarkable.    MUSCULOSKELETAL: Multilevel degenerative changes of the thoracic and lumbosacral spine. No acute osseous abnormality.      Impression    IMPRESSION:  1.  No pulmonary artery embolus or other acute process within the chest.  2.  Colonic diverticulosis. Partial sigmoidectomy. No definite evidence of postoperative complication or acute  diverticulitis.  3.  Diffuse hepatic steatosis, which may be a source of abdominal pain.   Basic metabolic panel     Status: Abnormal   Result Value Ref Range    Sodium 140 135 - 145 mmol/L    Potassium 3.5 3.4 - 5.3 mmol/L    Chloride 103 98 - 107 mmol/L    Carbon Dioxide (CO2) 21 (L) 22 - 29 mmol/L    Anion Gap 16 (H) 7 - 15 mmol/L    Urea Nitrogen 11.0 6.0 - 20.0 mg/dL    Creatinine 0.77 0.51 - 0.95 mg/dL    GFR Estimate >90 >60 mL/min/1.73m2    Calcium 9.3 8.8 - 10.4 mg/dL    Glucose 123 (H) 70 - 99 mg/dL   Troponin T, High Sensitivity     Status: Normal   Result Value Ref Range    Troponin T, High Sensitivity <6 <=14 ng/L   [place] Draw     Status: None    Narrative    The following orders were created for panel order [place] Draw.  Procedure                               Abnormality         Status                     ---------                               -----------         ------                     Extra Blue Top Tube[[id number]]                             Final result               Extra Red Top Tube[[id number]]                              Final result                 Please view results for these tests on the individual orders.   CBC with platelets and differential     Status: Abnormal   Result Value Ref Range    WBC Count 11.5 (H) 4.0 - 11.0 10e3/uL    RBC Count 4.91 3.80 - 5.20 10e6/uL    Hemoglobin 14.4 11.7 - 15.7 g/dL    Hematocrit 41.8 35.0 - 47.0 %    MCV 85 78 - 100 fL    MCH 29.3 26.5 - 33.0 pg    MCHC 34.4 31.5 - 36.5 g/dL    RDW 13.1 10.0 - 15.0 %    Platelet Count 296 150 - 450 10e3/uL    % Neutrophils 55 %    % Lymphocytes 36 %    % Monocytes 7 %    % Eosinophils 2 %    % Basophils 0 %    % Immature Granulocytes 1 %    NRBCs per 100 WBC 0 <1 /100    Absolute Neutrophils 6.3 1.6 - 8.3 10e3/uL    Absolute Lymphocytes 4.2 0.8 - 5.3 10e3/uL    Absolute Monocytes 0.8 0.0 - 1.3 10e3/uL    Absolute Eosinophils 0.2 0.0 - 0.7 10e3/uL    Absolute Basophils 0.0 0.0 - 0.2 10e3/uL    Absolute Immature  Granulocytes 0.1 <=0.4 10e3/uL    Absolute NRBCs 0.0 10e3/uL   Extra Blue Top Tube     Status: None   Result Value Ref Range    Hold Specimen JIC    Extra Red Top Tube     Status: None   Result Value Ref Range    Hold Specimen JIC    D dimer quantitative     Status: Normal   Result Value Ref Range    D-Dimer Quantitative <0.27 0.00 - 0.50 ug/mL FEU    Narrative    This D-dimer assay is intended for use in conjunction with a clinical pretest probability assessment model to exclude pulmonary embolism (PE) and deep venous thrombosis (DVT) in outpatients suspected of PE or DVT. The cut-off value is 0.50 ug/mL FEU.   Influenza A/B, RSV and SARS-CoV2 PCR (COVID-19) Nasopharyngeal     Status: Normal    Specimen: Nasopharyngeal; Swab   Result Value Ref Range    Influenza A PCR Negative Negative    Influenza B PCR Negative Negative    RSV PCR Negative Negative    SARS CoV2 PCR Negative Negative    Narrative    Testing was performed using the Xpert Xpress CoV2/Flu/RSV Assay on the [employer] GeneXpert Instrument. This test should be ordered for the detection of SARS-CoV2, influenza, and RSV viruses in individuals with signs and symptoms of respiratory tract infection. This test is for in vitro diagnostic use under the [place] FDA for laboratories certified under CLIA to perform high or moderate complexity testing. This test has been [place] FDA cleared. A negative result does not rule out the presence of PCR inhibitors in the specimen or target RNA in concentration below the limit of detection for the assay. If only one viral target is positive but coinfection with multiple targets is suspected, the sample should be re-tested with another FDA cleared, approved, or authorized test, if coninfection would change clinical management. This test was validated by the [facility]. These laboratories are certified under the Clinical Laboratory Improvement Amendments of 1988 (CLIA-88) as qualified to perfom high complexity  laboratory testing.   Lactic acid whole blood with 1x repeat in 2 hr when >2     Status: Abnormal   Result Value Ref Range    Lactic Acid, Initial 5.4 (HH) 0.7 - 2.0 mmol/L   HCG qualitative Blood     Status: Normal   Result Value Ref Range    hCG Serum Qualitative Negative Negative   Lactic acid whole blood     Status: Abnormal   Result Value Ref Range    Lactic Acid 3.4 (H) 0.7 - 2.0 mmol/L   Procalcitonin     Status: Normal   Result Value Ref Range    Procalcitonin 0.04 <0.50 ng/mL   Hepatic panel     Status: Normal   Result Value Ref Range    Protein Total 7.2 6.4 - 8.3 g/dL    Albumin 4.3 3.5 - 5.2 g/dL    Bilirubin Total 0.2 <=1.2 mg/dL    Alkaline Phosphatase 136 40 - 150 U/L    AST 33 0 - 45 U/L    ALT 40 0 - 50 U/L    Bilirubin Direct <0.08 0.00 - 0.30 mg/dL   Blood gas venous     Status: Abnormal   Result Value Ref Range    pH Venous 7.36 7.32 - 7.43    pCO2 Venous 34 (L) 40 - 50 mm Hg    pO2 Venous 64 (H) 25 - 47 mm Hg    Bicarbonate Venous 19 (L) 21 - 28 mmol/L    Base Excess/Deficit Venous -5.8 (L) -3.0 - 3.0 mmol/L    FIO2 0     Oxyhemoglobin Venous 91 (H) 70 - 75 %    O2 Sat, Venous 92.6 (H) 70.0 - 75.0 %    Narrative    In healthy individuals, oxyhemoglobin (O2Hb) and oxygen saturation (SO2) are approximately equal. In the presence of dyshemoglobins, oxyhemoglobin can be considerably lower than oxygen saturation.   Lactic acid whole blood     Status: Abnormal   Result Value Ref Range    Lactic Acid 3.9 (H) 0.7 - 2.0 mmol/L   Troponin T, High Sensitivity     Status: Normal   Result Value Ref Range    Troponin T, High Sensitivity <6 <=14 ng/L   EKG 12-lead, tracing only     Status: None   Result Value Ref Range    Systolic Blood Pressure  mmHg    Diastolic Blood Pressure  mmHg    Ventricular Rate 106 BPM    Atrial Rate 106 BPM    MS Interval 148 ms    QRS Duration 82 ms     ms    QTc 499 ms    P Axis 38 degrees    R AXIS 47 degrees    T Axis -10 degrees    Interpretation ECG       Sinus  tachycardia  T wave abnormality, consider inferior ischemia  Abnormal ECG  When compared with ECG of 29-Mar-2022 05:15,  T wave inversion now evident in Inferior leads  Unconfirmed report - interpretation of this ECG is computer generated - see medical record for final interpretation  Confirmed by - EMERGENCY ROOM, PHYSICIAN ([id number]),  GLADYS NAPOLES ([id number]) on 2025 6:40:19 AM     Echocardiogram Complete     Status: None   Result Value Ref Range    LVEF  60-65%     Narrative    [id number]  [id number]  [id number]  [id number]^RAY^DANNY^MARGAUX     [facility]  Echocardiography Laboratory  [address] [place] [address]     Name: FLACO MURPHY  MRN: [id number]  : 1990  Study Date: 2025 07:26 AM  Age: 34 yrs  Gender: Female  [facility]  Reason For Study: Chest Pain, Pericarditis  Ordering Physician: DANNY BELL  Referring Physician: Hospital, [facility]  Performed By: Linnea Rogers     BSA: 2.5 m2  Height: 67 in  Weight: 323 lb  HR: 63  BP: 133/81 mmHg  ______________________________________________________________________________  Procedure  Echocardiogram with two-dimensional, color and spectral Doppler. Technically  difficult study.Extremely poor acoustic windows.  ______________________________________________________________________________  Interpretation Summary     Technically very difficult study. Right-sided structures are very poorly  visualized. Contrast was not used.     Normal LV size and systolic function. Grossly normal wall motion.  On parasternal images RV appears grossly normal in size with normal systolic  function.  Valves are suboptimally visualized. No gross hemodynamically significant valve  lesions are noted.  No pericardial effusion is apparent.  IVC is suboptimally visualized.  ______________________________________________________________________________  Left Ventricle  The left ventricle is normal in  size. There is normal left ventricular wall  thickness. The visual ejection fraction is 60-65%. Diastolic Doppler findings  (E/E' ratio and/or other parameters) suggest left ventricular filling  pressures are normal. No regional wall motion abnormalities noted.     Right Ventricle  The right ventricle is grossly normal size. The right ventricular systolic  function is normal.     Atria  Normal left atrial size. Right atrial size is normal.     Mitral Valve  The mitral valve is normal in structure and function.     Tricuspid Valve  The tricuspid valve is not well visualized, but is grossly normal. Right  ventricular systolic pressure could not be approximated due to inadequate  tricuspid regurgitation.     Aortic Valve  The aortic valve is not well visualized. No hemodynamically significant  valvular aortic stenosis.     Pulmonic Valve  The pulmonic valve is not well visualized.     Vessels  The aortic root is normal size. Inferior vena cava not well visualized for  estimation of right atrial pressure.     Pericardium  There is no pericardial effusion.     ______________________________________________________________________________  MMode/2D Measurements & Calculations  IVSd: 0.81 cm  LVIDd: 5.0 cm  LVIDs: 3.7 cm  LVPWd: 1.0 cm  FS: 26.1 %  LV mass(C)d: 160.5 grams  LV mass(C)dI: 64.7 grams/m2     Ao root diam: 3.2 cm  LVOT diam: 2.0 cm  LVOT area: 3.2 cm2  Ao root diam index Ht(cm/m): 1.9  Ao root diam index BSA (cm/m2): 1.3  LA Volume (BP): 70.4 ml  LA Volume Index (BP): 28.4 ml/m2  RWT: 0.41  TAPSE: 2.6 cm     Doppler Measurements & Calculations  MV E max georgette: 95.9 cm/sec  MV A max georgette: 54.5 cm/sec  MV E/A: 1.8  MV max P.3 mmHg  MV mean P.4 mmHg  MV V2 VTI: 28.0 cm  MVA(VTI): 3.0 cm2  MV dec slope: 579.5 cm/sec2  MV dec time: 0.17 sec  Ao V2 max: 137.0 cm/sec  Ao max P.0 mmHg  Ao V2 mean: 93.7 cm/sec  Ao mean P.0 mmHg  Ao V2 VTI: 31.1 cm  FRIDA(I,D): 2.7 cm2  FRIDA(V,D): 2.8 cm2  LV V1 max P.8  mmHg  LV V1 max: 120.0 cm/sec  LV V1 VTI: 26.4 cm  SV(LVOT): 83.8 ml  SI(LVOT): 33.8 ml/m2  PA V2 max: 91.3 cm/sec  PA max PG: 3.3 mmHg  PA acc time: 0.21 sec  AV Kenneth Ratio (DI): 0.88  FRIDA Index (cm2/m2): 1.1  E/E' av.8  Lateral E/e': 6.2  Medial E/e': 7.5  RV S Kenneth: 13.3 cm/sec     ______________________________________________________________________________  Report approved by: Azar Freeman MD on 2025 09:20 AM         Blood Culture Peripheral blood (BC) Arm, Right     Status: Normal (Preliminary result)    Specimen: Arm, Right; Peripheral blood (BC)   Result Value Ref Range    Culture No growth after 12 hours    Blood Culture Peripheral blood (BC) Hand, Left     Status: Normal (Preliminary result)    Specimen: Hand, Left; Peripheral blood (BC)   Result Value Ref Range    Culture No growth after 12 hours    CBC with Platelets & Differential     Status: Abnormal    Narrative    The following orders were created for panel order CBC with Platelets & Differential.  Procedure                               Abnormality         Status                     ---------                               -----------         ------                     CBC with platelets and ...[[id number]]  Abnormal            Final result                 Please view results for these tests on the individual orders.     Ashley Beyer PA-C

## 2025-05-28 NOTE — ED TRIAGE NOTES
Intermittent chest pain for the past week. Pain worsened tonight. Patient denies hx of heart disease. Patient also reports shortness of breath.

## 2025-05-28 NOTE — PLAN OF CARE
"Patient is alert and oriented x 4. VS documented on the FS and patient is on RA. Active bowel sounds with LBM today. Patient denies any pain, urgency, and frequency when voiding. Reported chest tightness this morning. PRN Neb administered. Pt is tolerating regular diet. IV SL. Patient denies any pain. Patient is independent with transfers. Pt ambulated the hallways with writer. Denies any symptoms.     Plan: Ambulate hallways. Echo completed. Possible discharge home today.       Goal Outcome Evaluation:      Plan of Care Reviewed With: patient    Overall Patient Progress: improvingOverall Patient Progress: improving      Problem: Adult Inpatient Plan of Care  Goal: Plan of Care Review  Description: The Plan of Care Review/Shift note should be completed every shift.  The Outcome Evaluation is a brief statement about your assessment that the patient is improving, declining, or no change.  This information will be displayed automatically on your shift  note.  5/28/2025 1518 by Hunter Sheriff RN  Outcome: Progressing  Flowsheets (Taken 5/28/2025 1518)  Plan of Care Reviewed With: patient  Overall Patient Progress: improving  5/28/2025 1439 by Hunter Sheriff RN  Outcome: Progressing  Flowsheets (Taken 5/28/2025 1439)  Plan of Care Reviewed With: patient  Overall Patient Progress: improving  5/28/2025 1439 by Hunter Sheriff RN  Outcome: Progressing  Flowsheets (Taken 5/28/2025 1439)  Plan of Care Reviewed With: patient  Overall Patient Progress: improving  5/28/2025 1437 by Hunter Sheriff RN  Outcome: Progressing  Flowsheets (Taken 5/28/2025 1437)  Plan of Care Reviewed With: patient  Overall Patient Progress: improving  Goal: Patient-Specific Goal (Individualized)  Description: You can add care plan individualizations to a care plan. Examples of Individualization might be:  \"Parent requests to be called daily at 9am for status\", \"I have a hard time hearing out of my right ear\", or \"Do not touch me to wake me " "up as it startles  me\".  5/28/2025 1518 by Hunter hSeriff RN  Outcome: Progressing  5/28/2025 1439 by Hunter Sheriff RN  Outcome: Progressing  5/28/2025 1439 by Hunter Sheriff RN  Outcome: Not Progressing  5/28/2025 1437 by Hunter Sheriff RN  Outcome: Progressing  Goal: Absence of Hospital-Acquired Illness or Injury  5/28/2025 1518 by Hunter Sheriff RN  Outcome: Progressing  5/28/2025 1439 by Hunter Sheriff RN  Outcome: Progressing  5/28/2025 1439 by Hunter Sheriff RN  Outcome: Not Progressing  5/28/2025 1437 by Hunter Sheriff RN  Outcome: Progressing  Intervention: Identify and Manage Fall Risk  Recent Flowsheet Documentation  Taken 5/28/2025 1140 by Hunter Sheriff RN  Safety Promotion/Fall Prevention:   safety round/check completed   patient and family education   nonskid shoes/slippers when out of bed   lighting adjusted   assistive device/personal items within reach   activity supervised  Intervention: Prevent Skin Injury  Recent Flowsheet Documentation  Taken 5/28/2025 1140 by Hunter Sheriff RN  Body Position: position changed independently  Intervention: Prevent and Manage VTE (Venous Thromboembolism) Risk  Recent Flowsheet Documentation  Taken 5/28/2025 1140 by Hunter Sheriff RN  VTE Prevention/Management: SCDs off (sequential compression devices)  Intervention: Prevent Infection  Recent Flowsheet Documentation  Taken 5/28/2025 1140 by Hunter Sheriff RN  Infection Prevention:   single patient room provided   rest/sleep promoted   hand hygiene promoted  Goal: Optimal Comfort and Wellbeing  5/28/2025 1518 by Hunter Sheriff RN  Outcome: Progressing  5/28/2025 1439 by Hunter Sheriff RN  Outcome: Progressing  5/28/2025 1439 by Hunter Sheriff RN  Outcome: Not Progressing  5/28/2025 1437 by Hunter Sheriff RN  Outcome: Progressing  Goal: Readiness for Transition of Care  5/28/2025 1518 by Hunter Sheriff RN  Outcome: Progressing  5/28/2025 1439 by Hunter Sheriff, " RN  Outcome: Progressing  5/28/2025 1439 by Hunter Sheriff RN  Outcome: Not Progressing  5/28/2025 1437 by Hunter Sheriff, RN  Outcome: Progressing

## 2025-05-28 NOTE — PLAN OF CARE
"PRIMARY DIAGNOSIS: CHEST PAIN  OUTPATIENT/OBSERVATION GOALS TO BE MET BEFORE DISCHARGE:  1. Ruled out acute coronary syndrome (negative or stable Troponin):  Yes  2. Pain Status: Pain free.  3. Appropriate provocative testing performed: Yes  - Stress Test Procedure: Echo  - Interpretation of cardiac rhythm per telemetry tech: SR 65    4. Cleared by Consultants (if applicable):No  5. Return to near baseline physical activity: No  Discharge Planner Nurse   Safe discharge environment identified: No  Barriers to discharge: Yes       Entered by: Hunter Sheriff RN 05/28/2025 2:38 PM     Please review provider order for any additional goals.   Nurse to notify provider when observation goals have been met and patient is ready for discharge.    Goal Outcome Evaluation:      Plan of Care Reviewed With: patient    Overall Patient Progress: improvingOverall Patient Progress: improving      Problem: Adult Inpatient Plan of Care  Goal: Plan of Care Review  Description: The Plan of Care Review/Shift note should be completed every shift.  The Outcome Evaluation is a brief statement about your assessment that the patient is improving, declining, or no change.  This information will be displayed automatically on your shift  note.  Outcome: Progressing  Flowsheets (Taken 5/28/2025 1437)  Plan of Care Reviewed With: patient  Overall Patient Progress: improving  Goal: Patient-Specific Goal (Individualized)  Description: You can add care plan individualizations to a care plan. Examples of Individualization might be:  \"Parent requests to be called daily at 9am for status\", \"I have a hard time hearing out of my right ear\", or \"Do not touch me to wake me up as it startles  me\".  Outcome: Progressing  Goal: Absence of Hospital-Acquired Illness or Injury  Outcome: Progressing  Intervention: Identify and Manage Fall Risk  Recent Flowsheet Documentation  Taken 5/28/2025 1140 by Hunter Sheriff RN  Safety Promotion/Fall Prevention:   safety " round/check completed   patient and family education   nonskid shoes/slippers when out of bed   lighting adjusted   assistive device/personal items within reach   activity supervised  Intervention: Prevent Skin Injury  Recent Flowsheet Documentation  Taken 5/28/2025 1140 by Hunter Sheriff RN  Body Position: position changed independently  Intervention: Prevent and Manage VTE (Venous Thromboembolism) Risk  Recent Flowsheet Documentation  Taken 5/28/2025 1140 by Hunter Sheriff RN  VTE Prevention/Management: SCDs off (sequential compression devices)  Intervention: Prevent Infection  Recent Flowsheet Documentation  Taken 5/28/2025 1140 by Hunter Sheriff RN  Infection Prevention:   single patient room provided   rest/sleep promoted   hand hygiene promoted  Goal: Optimal Comfort and Wellbeing  Outcome: Progressing  Goal: Readiness for Transition of Care  Outcome: Progressing

## 2025-05-28 NOTE — ED NOTES
[facility]  ED Nurse Handoff Report    ED Chief complaint: Chest Pain and Shortness of Breath  . ED Diagnosis:   Final diagnoses:   Sepsis, due to unspecified organism, unspecified whether acute organ dysfunction present (H)   Mild intermittent asthma with exacerbation   Chest pain, unspecified type       Allergies:   Allergies   Allergen Reactions    Hydroxyzine Rash     Karen Buenrostro is a 34 year old female with history of COPD, and asthma who presents to the ED with chest pain and shortness of breath. Patient reports a week of intermittent chest tightness and sharp pains but tonight the sharp pain in her chest did not resolve and she had burning pains around her body, more in her neck and right arm. She endorses hot flashes, chills, palpitations and productive cough. She coughs more when her chest feels tight, and her chest pain worsens with talking. She states this does not feel like an asthma flare up. The pain came on randomly without any specific trigger. She had less of an appetite this weekend but when she does feel hungry she eats/drinks fine. She denies abdominal pain, trauma, heavy lifting, or sick contact. She is s/p partial colectomy 4/17/25 and is recovering well, she only has some gas pain.     Code Status: Full Code    Activity level - Baseline/Home:  independent.  Activity Level - Current:   independent.   Lift room needed: No.   Bariatric: No   Needed: No   Isolation: No.   Infection: Rule out PNA    Respiratory status: Room air    Vital Signs (within 30 minutes):   Vitals:    05/28/25 0145 05/28/25 0150 05/28/25 0155 05/28/25 0200   BP: (!) 146/95      Pulse: 71 71 68 82   Resp: 12 15 11 17   Temp:       TempSrc:       SpO2: 97% 98% 95% 94%   Weight:           Cardiac Rhythm:  ,   Cardiac  Cardiac Rhythm: Normal sinus rhythm  Pain level:  2/10  Patient confused: No.   Patient Falls Risk: nonskid shoes/slippers when out of bed.   Elimination Status: Has  voided     Patient Report - Initial Complaint: SOB.   Focused Assessment: Resp, IV abx, breathing techniques     Abnormal Results:   Labs Ordered and Resulted from Time of ED Arrival to Time of ED Departure   BASIC METABOLIC PANEL - Abnormal       Result Value    Sodium 140      Potassium 3.5      Chloride 103      Carbon Dioxide (CO2) 21 (*)     Anion Gap 16 (*)     Urea Nitrogen 11.0      Creatinine 0.77      GFR Estimate >90      Calcium 9.3      Glucose 123 (*)    CBC WITH PLATELETS AND DIFFERENTIAL - Abnormal    WBC Count 11.5 (*)     RBC Count 4.91      Hemoglobin 14.4      Hematocrit 41.8      MCV 85      MCH 29.3      MCHC 34.4      RDW 13.1      Platelet Count 296      % Neutrophils 55      % Lymphocytes 36      % Monocytes 7      % Eosinophils 2      % Basophils 0      % Immature Granulocytes 1      NRBCs per 100 WBC 0      Absolute Neutrophils 6.3      Absolute Lymphocytes 4.2      Absolute Monocytes 0.8      Absolute Eosinophils 0.2      Absolute Basophils 0.0      Absolute Immature Granulocytes 0.1      Absolute NRBCs 0.0     LACTIC ACID WHOLE BLOOD WITH 1X REPEAT IN 2 HR WHEN >2 - Abnormal    Lactic Acid, Initial 5.4 (*)    LACTIC ACID WHOLE BLOOD - Abnormal    Lactic Acid 3.4 (*)    TROPONIN T, HIGH SENSITIVITY - Normal    Troponin T, High Sensitivity <6     D DIMER QUANTITATIVE - Normal    D-Dimer Quantitative <0.27     INFLUENZA A/B, RSV AND SARS-COV2 PCR - Normal    Influenza A PCR Negative      Influenza B PCR Negative      RSV PCR Negative      SARS CoV2 PCR Negative     HCG QUALITATIVE PREGNANCY - Normal    hCG Serum Qualitative Negative     PROCALCITONIN   BLOOD CULTURE   BLOOD CULTURE        CT Chest (PE) Abdomen Pelvis w Contrast   Final Result   IMPRESSION:   1.  No pulmonary artery embolus or other acute process within the chest.   2.  Colonic diverticulosis. Partial sigmoidectomy. No definite evidence of postoperative complication or acute diverticulitis.   3.  Diffuse hepatic steatosis,  which may be a source of abdominal pain.      XR Chest 2 Views   Final Result   IMPRESSION: The lungs are clear. There is no pleural effusion or pneumothorax. The cardiomediastinal silhouette is normal. The limited visualized portions of the upper abdomen are grossly normal.          Treatments provided: See MAR  Family Comments: Partner was at bedside.  OBS brochure/video discussed/provided to patient:  Yes  ED Medications:   Medications   ipratropium - albuterol 0.5 mg/2.5 mg/3 mL (DUONEB) neb solution 3 mL (3 mLs Nebulization $Given 5/27/25 2227)   cefTRIAXone (ROCEPHIN) 2 g vial to attach to  ml bag for ADULTS or NS 50 ml bag for PEDS (2 g Intravenous $New Bag 5/28/25 0210)   sodium chloride 0.9% BOLUS 1,000 mL (0 mLs Intravenous Stopped 5/28/25 0114)   predniSONE (DELTASONE) tablet 60 mg (60 mg Oral $Given 5/27/25 2226)   ketorolac (TORADOL) injection 15 mg (15 mg Intravenous $Given 5/28/25 0001)   sodium chloride 0.9% BOLUS 1,000 mL (0 mLs Intravenous Stopped 5/28/25 0156)   iopamidol (ISOVUE-370) solution 500 mL (85 mLs Intravenous $Given 5/28/25 0042)   sodium chloride 0.9 % bag for CT scan flush (90 mLs Intravenous $Given 5/28/25 0042)   azithromycin (ZITHROMAX) tablet 500 mg (500 mg Oral $Given 5/28/25 0219)       Drips infusing:  No  For the majority of the shift this patient was Green.   Interventions performed were N/a.    Sepsis treatment initiated: No    Cares/treatment/interventions/medications to be completed following ED care: IV abx, resp, coping.    ED Nurse Name: Tamika Martinez RN  2:22 AM

## 2025-05-28 NOTE — PHARMACY-ADMISSION MEDICATION HISTORY
Pharmacy Intern Admission Medication History    Admission medication history is complete. The information provided in this note is only as accurate as the sources available at the time of the update.    Information Source(s): Patient via in-person    Pertinent Information:     Changes made to PTA medication list:  Added: omeprazole, diphenhydramine  Deleted: Brook Nadeemjose birth control  Changed: None    Allergies reviewed with patient and updates made in EHR: yes    Medication History Completed By: Tova Niño 5/28/2025 8:21 AM    PTA Med List   Medication Sig Last Dose/Taking    albuterol (PROAIR HFA/PROVENTIL HFA/VENTOLIN HFA) 108 (90 Base) MCG/ACT inhaler Inhale 1-2 puffs into the lungs every 4 hours as needed for shortness of breath / dyspnea or wheezing Taking As Needed    diphenhydrAMINE (BENADRYL) 25 MG tablet Take 25 mg by mouth daily as needed for allergies. Taking As Needed    norethindrone (MICRONOR) 0.35 MG tablet Take 1 tablet by mouth daily. 5/27/2025 Morning    omeprazole (PRILOSEC) 20 MG DR capsule Take 20 mg by mouth daily as needed. Taking As Needed    ondansetron (ZOFRAN ODT) 4 MG ODT tab Take 1 tablet (4 mg) by mouth every 8 hours as needed for nausea Past Week

## 2025-05-28 NOTE — PROGRESS NOTES
ROOM # 206-1    Living Situation (if not independent, order SW consult): apartment with   Facility name:  : [facility] file    Activity level at baseline: ind  Activity level on admit: ind    Who will be transporting you at discharge:     Patient registered to observation; given Patient Bill of Rights; given the opportunity to ask questions about observation status and their plan of care.  Patient has been oriented to the observation room, bathroom and call light is in place.    Discussed discharge goals and expectations with patient/family.

## 2025-05-28 NOTE — ED NOTES
Patient hyperventilating. Provided emotional support and breathing techniques. Pt able to slow her RR down.

## 2025-05-28 NOTE — PLAN OF CARE
"Patient's After Visit Summary was reviewed with patient and/or spouse.   Patient verbalized understanding of After Visit Summary, recommended follow up and was given an opportunity to ask questions.   Discharge medications sent home with patient/family: No   Discharged with spouse        Goal Outcome Evaluation:      Plan of Care Reviewed With: patient    Overall Patient Progress: improvingOverall Patient Progress: improving      Problem: Adult Inpatient Plan of Care  Goal: Plan of Care Review  Description: The Plan of Care Review/Shift note should be completed every shift.  The Outcome Evaluation is a brief statement about your assessment that the patient is improving, declining, or no change.  This information will be displayed automatically on your shift  note.  5/28/2025 1529 by Hunter Sheriff RN  Outcome: Met  Flowsheets (Taken 5/28/2025 1529)  Plan of Care Reviewed With: patient  Overall Patient Progress: improving  5/28/2025 1518 by Hunter Sheriff RN  Outcome: Progressing  Flowsheets (Taken 5/28/2025 1518)  Plan of Care Reviewed With: patient  Overall Patient Progress: improving  5/28/2025 1439 by Hunter Sheriff RN  Outcome: Progressing  Flowsheets (Taken 5/28/2025 1439)  Plan of Care Reviewed With: patient  Overall Patient Progress: improving  5/28/2025 1439 by Hunter Sheriff RN  Outcome: Progressing  Flowsheets (Taken 5/28/2025 1439)  Plan of Care Reviewed With: patient  Overall Patient Progress: improving  5/28/2025 1437 by Hunter Sheriff RN  Outcome: Progressing  Flowsheets (Taken 5/28/2025 1437)  Plan of Care Reviewed With: patient  Overall Patient Progress: improving  Goal: Patient-Specific Goal (Individualized)  Description: You can add care plan individualizations to a care plan. Examples of Individualization might be:  \"Parent requests to be called daily at 9am for status\", \"I have a hard time hearing out of my right ear\", or \"Do not touch me to wake me up as it " "startles  me\".  5/28/2025 1529 by Hunter Sheriff RN  Outcome: Met  5/28/2025 1518 by Hunter Sheriff RN  Outcome: Progressing  5/28/2025 1439 by Hunter Sheriff RN  Outcome: Progressing  5/28/2025 1439 by Hunter Sheriff RN  Outcome: Not Progressing  5/28/2025 1437 by Hunter Sheriff RN  Outcome: Progressing  Goal: Absence of Hospital-Acquired Illness or Injury  5/28/2025 1529 by Hunter Sheriff RN  Outcome: Met  5/28/2025 1518 by Hunter Sheriff RN  Outcome: Progressing  5/28/2025 1439 by Hunter Sheriff RN  Outcome: Progressing  5/28/2025 1439 by Hunter Sheriff RN  Outcome: Not Progressing  5/28/2025 1437 by Hunter Sheriff RN  Outcome: Progressing  Intervention: Identify and Manage Fall Risk  Recent Flowsheet Documentation  Taken 5/28/2025 1140 by Hunter Sheriff RN  Safety Promotion/Fall Prevention:   safety round/check completed   patient and family education   nonskid shoes/slippers when out of bed   lighting adjusted   assistive device/personal items within reach   activity supervised  Intervention: Prevent Skin Injury  Recent Flowsheet Documentation  Taken 5/28/2025 1140 by Hunter Sheriff RN  Body Position: position changed independently  Intervention: Prevent and Manage VTE (Venous Thromboembolism) Risk  Recent Flowsheet Documentation  Taken 5/28/2025 1140 by Hunter Sheriff RN  VTE Prevention/Management: SCDs off (sequential compression devices)  Intervention: Prevent Infection  Recent Flowsheet Documentation  Taken 5/28/2025 1140 by Hunter Sheriff RN  Infection Prevention:   single patient room provided   rest/sleep promoted   hand hygiene promoted  Goal: Optimal Comfort and Wellbeing  5/28/2025 1529 by Hunter Sheriff RN  Outcome: Met  5/28/2025 1518 by Hunter Sheriff RN  Outcome: Progressing  5/28/2025 1439 by Hunter Sheriff RN  Outcome: Progressing  5/28/2025 1439 by Mokaya, Aggrey S, RN  Outcome: Not Progressing  5/28/2025 1437 by Hunter Sheriff, RN  Outcome: " Progressing  Goal: Readiness for Transition of Care  5/28/2025 1529 by Hunter Sheriff RN  Outcome: Met  5/28/2025 1518 by Hunter Sheriff RN  Outcome: Progressing  5/28/2025 1439 by Hunter Sheirff RN  Outcome: Progressing  5/28/2025 1439 by Hunter Sheriff RN  Outcome: Not Progressing  5/28/2025 1437 by Hunter Sheriff RN  Outcome: Progressing

## 2025-05-28 NOTE — H&P
[facility]  Hospitalist Admission Note  Name: Karen Buenrostro    MRN: [id number]  YOB: 1990    Age: 34 year old  Date of admission: 5/27/2025  Primary care provider: [facility], [facility]    Chief Complaint: Chest pain    Assessment and Plan:   Atypical chest pain  Suspect viral URI  Asthmatic exacerbation  Elevated lactic acid: She has had chest tightness and shortness of breath for the past week along with increased cough from baseline productive of white sputum which is suspected secondary to viral infection resulting in asthma exacerbation.  Has not been using her controller inhaler.  For the last 2 to 3 days she has had intermittent sharp  left-sided chest pain that lasted few minutes.  This chest pain can start at rest or with exertion and she has never had it before.  Denies any fevers or chills, but she has had some hot flashes, palpitations, slight nausea without vomiting.  Denies significant acid reflux symptoms.  No lower extremity edema or swelling.  Denies dysuria, but cannot say if she has hematuria or not as she is just finishing her menstrual cycle.  She does not smoke or vape.  In the ER she was initially tachycardic to 130 and hyperventilating with respiratory rate in the 30s.  O2 sats % on room air and afebrile.  Initial labs show slight leukocytosis at 11.5, undetectable troponin, undetectable D-dimer, negative COVID-19, influenza A/B, RSV PCR.  Lactic acid was elevated at 5.4 with repeat down to 3.4 having received 2 L NS.  She did receive a DuoNeb, prednisone 60 mg, and empiric IV ceftriaxone and azithromycin in case of sepsis.  CT chest/ab/pelvis did not show any PE or pulmonary infiltrate, there was diffuse hepatic steatosis which is not new, and previous partial sigmoidectomy with diverticulosis without diverticulitis.  I suspect she has asthma exacerbation from a viral infection and the chest pain is likely musculoskeletal or pleuritic secondary  to coughing.  Pericarditis is in the differential and she says she has had this before although I do not have documentation for this.  Troponin is negative so she does not have myocarditis.  -Prednisone 40 mg daily  -DuoNebs and albuterol inhaler as needed, she needs a albuterol refill on discharge  -Cardiac monitoring overnight due to palpitations and initial tachycardia  -Obtain TTE for completeness and to eval for pericarditis/effusion  -Recheck troponin T, VBG, lactic acid with morning lab draw  -Acetaminophen for mild pain and ibuprofen 400 mg every 6 hours as needed for inflammatory pain  -Start oral pantoprazole 40 mg daily for GI protection given history of duodenitis now with some prednisone and ibuprofen use and possible treatment of GERD  -As needed Tums and Maalox for recurrence of chest pain  -Z-Sky  - Tessalon Perles and guaifenesin as needed for cough    History diverticulitis: Underwent partial sigmoidectomy 4/17/2025.  Incision sites do not have any sign of infection.  No postop complication seen on CT abdomen pelvis here.    Morbid obesity: BMI 48.2.    Hepatic steatosis: Seen on CT, not a new issue.    Resume PTA daily norethindrone OCP    DVT Prophylaxis: Low Risk/Ambulatory with no VTE prophylaxis indicated  Code Status: Full Code  FEN: Regular diet  Discharge Dispo: Home  Estimated Disch Date / # of Days until Disch: Obtain TTE during the day and monitor for recurrence of chest pain/shortness of breath.  Anticipate discharge by the afternoon with short course of prednisone, refill of albuterol inhaler, and acetaminophen ibuprofen as needed (prescribed pantoprazole)      History of Present Illness:  Karen Buenrostro is a 34 year old female with PMH including morbid obesity, asthma, anxiety, migraine, hepatic steatosis, and diverticulitis with recent partial sigmoidectomy 4/17/2025 who presents with chest pain and shortness of breath.  She has had chest tightness and shortness of breath for  the past week along with increased cough from baseline productive of white sputum which is suspected secondary to viral infection resulting in asthma exacerbation.  Has not been using her controller inhaler.  For the last 2 to 3 days she has had intermittent sharp  left-sided chest pain that lasted few minutes.  This chest pain can start at rest or with exertion and she has never had it before.  Denies any fevers or chills, but she has had some hot flashes, palpitations, slight nausea without vomiting.  Denies significant acid reflux symptoms.  No lower extremity edema or swelling.  Denies dysuria, but cannot say if she has hematuria or not as she is just finishing her menstrual cycle.  She does not smoke or vape.    History obtained from patient, medical record, and from Dr. Patterson in the emergency department.  Blood pressure 134/82.  Initially tachycardic at 132 and tachypneic with respiratory rate 33.  Temperature 97.9  F, oxygen % on room air.  Initial labs show leukocytosis 11.5, hemoglobin 14.4, platelet count 296.  Bicarb 21 otherwise BMP unremarkable.  Glucose 123.  Serum hCG negative.  Troponin T <6.  D-dimer <0.27.  COVID-19, influenza A/B, RSV PCR negative.  EKG shows sinus tachycardia with some lateral T wave inversions in leads V3-V6.  Chest x-ray unremarkable.  CT chest/ab/pelvis shows diffuse tabetic steatosis and previous partial sigmoidectomy with diverticulosis and no diverticulitis, but no DVT or infiltrate.  Lactic acid was elevated at 5.4 with repeat down to 3.4.  She did receive empiric IV ceftriaxone and azithromycin p.o.  She also received 2 L NS and 60 mg prednisone.  Admit observation with repeat labs in the morning and TTE.       Clinically Significant Risk Factors Present on Admission                                 # Asthma: noted on problem list                  Past Medical History reviewed:  Past Medical History:   Diagnosis Date    Anxiety     Diverticulitis     s/p partial  sigmoidectomy 4/17/25    Duodenitis determined by biopsy 2013    thought due to excedrin use    Fatty liver disease, nonalcoholic     Morbid obesity (H)     Post concussion syndrome 2011    Uncomplicated asthma couple years ago     Past Surgical History reviewed:  Past Surgical History:   Procedure Laterality Date    CHOLECYSTECTOMY       Social History reviewed:  Social History     Tobacco Use    Smoking status: Never    Smokeless tobacco: Never   Substance Use Topics    Alcohol use: Yes     Comment: occ     Social History     Social History Narrative    Not on file     Family History reviewed:  Family History   Problem Relation Age of Onset    Family History Negative Father     Asthma Father     Family History Negative Mother     Depression Sister     Asthma Sister      Allergies:  Allergies   Allergen Reactions    Hydroxyzine Rash     Medications:  Zofran as needed  Norethindrone 0.35 mg daily  Ran out of albuterol inhaler    Review of Systems:  A Comprehensive greater than 10 system review of systems was carried out.  Pertinent positives and negatives are noted above.  Otherwise negative.     Physical Exam:  Blood pressure (!) 146/95, pulse 82, temperature 97.9  F (36.6  C), temperature source Temporal, resp. rate 17, weight (!) 143.8 kg (317 lb 0.3 oz), SpO2 94%, not currently breastfeeding.  Wt Readings from Last 1 Encounters:   05/27/25 (!) 143.8 kg (317 lb 0.3 oz)     Exam:  Constitutional: Awake, NAD  Eyes: sclera white  HEENT: Dry mucous membranes  Respiratory: Slight tachypnea, no focal crackles or wheeze  Cardiovascular: RRR.  No murmur  GI: Obese, soft, not distended.  Mild left upper quadrant and epigastric tenderness to palpation without guarding.   Skin: no rash   Musculoskeletal/extremities: No edema  Neurologic: A&O, speech clear  Psychiatric: Anxious, but cooperative    Lab and imaging data personally reviewed:  Labs:  Recent Labs   Lab 05/27/25  2203   WBC 11.5*   HGB 14.4   HCT 41.8   MCV 85         Recent Labs   Lab 05/27/25  2203      POTASSIUM 3.5   CHLORIDE 103   CO2 21*   ANIONGAP 16*   *   BUN 11.0   CR 0.77   GFRESTIMATED >90   RAFFY 9.3     Serum hCG negative  Troponin T <6  D-dimer <0.27  COVID-19, influenza A/B, RSV PCR negative  Lactic acid 5.4 with repeat 3.4    EKG: Sinus tachycardia, subtle lateral T wave inversions in leads V3-V6    Imaging:  Recent Results (from the past 24 hours)   XR Chest 2 Views    Narrative    EXAM: XR CHEST 2 VIEWS  LOCATION: [facility]  DATE: 5/27/2025    INDICATION: chest pain and SOB  COMPARISON: 6/15/2024      Impression    IMPRESSION: The lungs are clear. There is no pleural effusion or pneumothorax. The cardiomediastinal silhouette is normal. The limited visualized portions of the upper abdomen are grossly normal.   CT Chest (PE) Abdomen Pelvis w Contrast    Narrative    EXAM: CT CHEST PE ABDOMEN PELVIS W CONTRAST  LOCATION: [facility]  DATE: 5/28/2025    INDICATION: elevated lactic, sob, chest pain, status post partial colectomy.  COMPARISON: Prior day 2 view chest radiograph, CT abdomen/pelvis with contrast 02/15/2025  TECHNIQUE: CT chest pulmonary angiogram and routine CT abdomen pelvis with IV contrast. Arterial phase through the chest and venous phase through the abdomen and pelvis. Multiplanar reformats and MIP reconstructions were performed. Dose reduction   techniques were used.   CONTRAST: 85mL Isovue 370    FINDINGS:  ANGIOGRAM CHEST: Pulmonary arteries are normal caliber and negative for pulmonary emboli. Thoracic aorta is negative for dissection. No CT evidence of right heart strain.     LUNGS AND PLEURA: Normal.    MEDIASTINUM/AXILLAE: Normal.    CORONARY ARTERY CALCIFICATION: None.    HEPATOBILIARY: Diffuse hepatic steatosis. Cholecystectomy.    PANCREAS: Normal.    SPLEEN: Normal.    ADRENAL GLANDS: Normal.    KIDNEYS/BLADDER: Normal.    BOWEL: Colonic diverticulosis. Partial  sigmoidectomy. No bowel obstruction.    LYMPH NODES: Normal.    VASCULATURE: Normal.    PELVIC ORGANS: Unremarkable.    MUSCULOSKELETAL: Multilevel degenerative changes of the thoracic and lumbosacral spine. No acute osseous abnormality.      Impression    IMPRESSION:  1.  No pulmonary artery embolus or other acute process within the chest.  2.  Colonic diverticulosis. Partial sigmoidectomy. No definite evidence of postoperative complication or acute diverticulitis.  3.  Diffuse hepatic steatosis, which may be a source of abdominal pain.       Robb Lin MD  Hospitalist  [facility]

## 2025-05-28 NOTE — PLAN OF CARE
"PRIMARY DIAGNOSIS: CHEST PAIN  OUTPATIENT/OBSERVATION GOALS TO BE MET BEFORE DISCHARGE:  1. Ruled out acute coronary syndrome (negative or stable Troponin):  Yes  2. Pain Status: Pain free.  3. Appropriate provocative testing performed: Yes  - Stress Test Procedure: Echo  - Interpretation of cardiac rhythm per telemetry tech: SR 74    4. Cleared by Consultants (if applicable):No  5. Return to near baseline physical activity: Yes  Discharge Planner Nurse   Safe discharge environment identified: No  Barriers to discharge: Yes       Entered by: Hunter Sheriff RN 05/28/2025 2:39 PM     Please review provider order for any additional goals.   Nurse to notify provider when observation goals have been met and patient is ready for discharge.        Goal Outcome Evaluation:      Plan of Care Reviewed With: patient    Overall Patient Progress: improvingOverall Patient Progress: improving      Problem: Adult Inpatient Plan of Care  Goal: Plan of Care Review  Description: The Plan of Care Review/Shift note should be completed every shift.  The Outcome Evaluation is a brief statement about your assessment that the patient is improving, declining, or no change.  This information will be displayed automatically on your shift  note.  5/28/2025 1439 by uHnter Sheriff RN  Outcome: Progressing  Flowsheets (Taken 5/28/2025 1439)  Plan of Care Reviewed With: patient  Overall Patient Progress: improving  5/28/2025 1439 by Hunter Sheriff RN  Outcome: Progressing  Flowsheets (Taken 5/28/2025 1439)  Plan of Care Reviewed With: patient  Overall Patient Progress: improving  5/28/2025 1437 by Hunter Sheriff RN  Outcome: Progressing  Flowsheets (Taken 5/28/2025 1437)  Plan of Care Reviewed With: patient  Overall Patient Progress: improving  Goal: Patient-Specific Goal (Individualized)  Description: You can add care plan individualizations to a care plan. Examples of Individualization might be:  \"Parent requests to be called daily at " "9am for status\", \"I have a hard time hearing out of my right ear\", or \"Do not touch me to wake me up as it startles  me\".  5/28/2025 1439 by Hunter Sheriff RN  Outcome: Progressing  5/28/2025 1439 by Hunter hSeriff RN  Outcome: Not Progressing  5/28/2025 1437 by Hunter Sheriff RN  Outcome: Progressing  Goal: Absence of Hospital-Acquired Illness or Injury  5/28/2025 1439 by Hunter Sheriff RN  Outcome: Progressing  5/28/2025 1439 by Hunter Sheriff RN  Outcome: Not Progressing  5/28/2025 1437 by Hunter Sheriff RN  Outcome: Progressing  Intervention: Identify and Manage Fall Risk  Recent Flowsheet Documentation  Taken 5/28/2025 1140 by Hunter Sheriff RN  Safety Promotion/Fall Prevention:   safety round/check completed   patient and family education   nonskid shoes/slippers when out of bed   lighting adjusted   assistive device/personal items within reach   activity supervised  Intervention: Prevent Skin Injury  Recent Flowsheet Documentation  Taken 5/28/2025 1140 by Hunter Sheriff RN  Body Position: position changed independently  Intervention: Prevent and Manage VTE (Venous Thromboembolism) Risk  Recent Flowsheet Documentation  Taken 5/28/2025 1140 by Hunter Sheriff RN  VTE Prevention/Management: SCDs off (sequential compression devices)  Intervention: Prevent Infection  Recent Flowsheet Documentation  Taken 5/28/2025 1140 by Hunter Sheriff RN  Infection Prevention:   single patient room provided   rest/sleep promoted   hand hygiene promoted  Goal: Optimal Comfort and Wellbeing  5/28/2025 1439 by Hunter Sheriff RN  Outcome: Progressing  5/28/2025 1439 by Hunter Sheriff RN  Outcome: Not Progressing  5/28/2025 1437 by Hunter Sheriff RN  Outcome: Progressing  Goal: Readiness for Transition of Care  5/28/2025 1439 by Hunter Sheriff RN  Outcome: Progressing  5/28/2025 1439 by Hunter Sheriff RN  Outcome: Not Progressing  5/28/2025 1437 by Hunter Sheriff RN  Outcome: " Progressing

## 2025-05-28 NOTE — ED PROVIDER NOTES
Emergency Department Note      History of Present Illness     Chief Complaint   Chest Pain and Shortness of Breath      LION Buenrostro is a 34 year old female with history of COPD, and asthma who presents to the ED with chest pain and shortness of breath. Patient reports a week of intermittent chest tightness and sharp pains but tonight the sharp pain in her chest did not resolve and she had burning pains around her body, more in her neck and right arm. She endorses hot flashes, chills, palpitations and productive cough. She coughs more when her chest feels tight, and her chest pain worsens with talking. She states this does not feel like an asthma flare up. The pain came on randomly without any specific trigger. She had less of an appetite this weekend but when she does feel hungry she eats/drinks fine. She denies abdominal pain, trauma, heavy lifting, or sick contact. She is s/p partial colectomy 4/17/25 and is recovering well, she only has some gas pain.     Independent Historian   None    Past Medical History     Medical History and Problem List   Anxiety  Diverticulitis  Duodenitis    Dyslipidemia   MASLD   Migraine   Morbid obesity  Asthma   COPD    Medications   Albuterol   Breo ellipta   Micronor   Omeprazole   Sertraline   Advair   Ashlyna     Surgical History   Cholecystectomy   Partial colectomy     Physical Exam     Patient Vitals for the past 24 hrs:   BP Temp Temp src Pulse Resp SpO2 Weight   05/28/25 0240 -- -- -- 65 16 97 % --   05/28/25 0235 -- -- -- 69 23 97 % --   05/28/25 0230 133/81 -- -- 64 15 95 % --   05/28/25 0220 -- -- -- 64 13 97 % --   05/28/25 0215 132/89 -- -- 66 14 96 % --   05/28/25 0200 -- -- -- 82 17 94 % --   05/28/25 0155 -- -- -- 68 11 95 % --   05/28/25 0150 -- -- -- 71 15 98 % --   05/28/25 0145 (!) 146/95 -- -- 71 12 97 % --   05/28/25 0130 (!) 138/91 -- -- 71 10 100 % --   05/28/25 0120 (!) 158/62 -- -- 85 19 100 % --   05/28/25 0040 -- -- -- -- -- 100 % --    05/28/25 0035 -- -- -- 72 12 98 % --   05/28/25 0030 139/80 -- -- 71 10 97 % --   05/27/25 2315 -- -- -- 102 (!) 33 100 % --   05/27/25 2310 (!) 133/90 -- -- 100 -- -- --   05/27/25 2305 (!) 142/78 -- -- 108 (!) 41 100 % --   05/27/25 2302 -- -- -- 101 20 100 % --   05/27/25 2245 105/41 -- -- 100 19 100 % --   05/27/25 2230 106/51 -- -- 98 17 100 % --   05/27/25 2215 109/55 -- -- 101 21 99 % --   05/27/25 2157 134/82 -- -- -- -- -- --   05/27/25 2155 -- 97.9  F (36.6  C) Temporal (!) 132 -- 100 % --   05/27/25 2154 -- -- -- -- 30 -- (!) 143.8 kg (317 lb 0.3 oz)     Physical Exam  General: Patient is awake, alert and interactive  Head: The scalp, face, and head appear normal  Eyes: The pupils are equal, round, and reactive to light. Conjunctivae and sclerae are normal  Neck: Normal range of motion.  CV: Tachycardic but regular. Peripheral pulses including radial pulses are symmetric.   Resp: tachypneic. Lungs are clear without wheezes or rales. No respiratory distress.   GI: Abdomen is soft, no rigidity, guarding, or rebound. No distension. No tenderness to palpation in any quadrant.    MS: Chest wall is non tender to palpation. No asymmetric leg swelling, calf or thigh tenderness.    Skin: No rash or lesions noted. Normal capillary refill noted  Neuro: Speech is normal and fluent. Face is symmetric. Moving all extremities.   Psych:  Normal affect.  Appropriate interactions.     Diagnostics     Lab Results   Labs Ordered and Resulted from Time of ED Arrival to Time of ED Departure   BASIC METABOLIC PANEL - Abnormal       Result Value    Sodium 140      Potassium 3.5      Chloride 103      Carbon Dioxide (CO2) 21 (*)     Anion Gap 16 (*)     Urea Nitrogen 11.0      Creatinine 0.77      GFR Estimate >90      Calcium 9.3      Glucose 123 (*)    CBC WITH PLATELETS AND DIFFERENTIAL - Abnormal    WBC Count 11.5 (*)     RBC Count 4.91      Hemoglobin 14.4      Hematocrit 41.8      MCV 85      MCH 29.3      MCHC 34.4       RDW 13.1      Platelet Count 296      % Neutrophils 55      % Lymphocytes 36      % Monocytes 7      % Eosinophils 2      % Basophils 0      % Immature Granulocytes 1      NRBCs per 100 WBC 0      Absolute Neutrophils 6.3      Absolute Lymphocytes 4.2      Absolute Monocytes 0.8      Absolute Eosinophils 0.2      Absolute Basophils 0.0      Absolute Immature Granulocytes 0.1      Absolute NRBCs 0.0     LACTIC ACID WHOLE BLOOD WITH 1X REPEAT IN 2 HR WHEN >2 - Abnormal    Lactic Acid, Initial 5.4 (*)    LACTIC ACID WHOLE BLOOD - Abnormal    Lactic Acid 3.4 (*)    TROPONIN T, HIGH SENSITIVITY - Normal    Troponin T, High Sensitivity <6     D DIMER QUANTITATIVE - Normal    D-Dimer Quantitative <0.27     INFLUENZA A/B, RSV AND SARS-COV2 PCR - Normal    Influenza A PCR Negative      Influenza B PCR Negative      RSV PCR Negative      SARS CoV2 PCR Negative     HCG QUALITATIVE PREGNANCY - Normal    hCG Serum Qualitative Negative     PROCALCITONIN - Normal    Procalcitonin 0.04     HEPATIC FUNCTION PANEL - Normal    Protein Total 7.2      Albumin 4.3      Bilirubin Total 0.2      Alkaline Phosphatase 136      AST 33      ALT 40      Bilirubin Direct <0.08     BLOOD CULTURE   BLOOD CULTURE       Imaging   CT Chest (PE) Abdomen Pelvis w Contrast   Final Result   IMPRESSION:   1.  No pulmonary artery embolus or other acute process within the chest.   2.  Colonic diverticulosis. Partial sigmoidectomy. No definite evidence of postoperative complication or acute diverticulitis.   3.  Diffuse hepatic steatosis, which may be a source of abdominal pain.      XR Chest 2 Views   Final Result   IMPRESSION: The lungs are clear. There is no pleural effusion or pneumothorax. The cardiomediastinal silhouette is normal. The limited visualized portions of the upper abdomen are grossly normal.      Echocardiogram Complete    (Results Pending)       EKG   ECG taken at 2202, ECG read at 2218  Sinus tachycardia  T wave abnormality, consider  inferior ischemia   Rate 106 bpm. WV interval 148 ms. QRS duration 82 ms. QT/QTc 376/499 ms. P-R-T axes 38 47 -10.    Independent Interpretation   CXR: No pneumonia or pneumothorax.    ED Course      Medications Administered   Medications   norethindrone (MICRONOR) 0.35 MG per tablet 0.35 mg (has no administration in time range)   senna-docusate (SENOKOT-S/PERICOLACE) 8.6-50 MG per tablet 1 tablet (has no administration in time range)     Or   senna-docusate (SENOKOT-S/PERICOLACE) 8.6-50 MG per tablet 2 tablet (has no administration in time range)   ondansetron (ZOFRAN ODT) ODT tab 4 mg (has no administration in time range)     Or   ondansetron (ZOFRAN) injection 4 mg (has no administration in time range)   prochlorperazine (COMPAZINE) injection 10 mg (has no administration in time range)     Or   prochlorperazine (COMPAZINE) tablet 10 mg (has no administration in time range)   predniSONE (DELTASONE) tablet 40 mg (has no administration in time range)   ipratropium - albuterol 0.5 mg/2.5 mg/3 mL (DUONEB) neb solution 3 mL (has no administration in time range)   albuterol (PROVENTIL HFA/VENTOLIN HFA) inhaler (has no administration in time range)   melatonin tablet 5 mg (has no administration in time range)   acetaminophen (TYLENOL) tablet 650 mg (has no administration in time range)     Or   acetaminophen (TYLENOL) Suppository 650 mg (has no administration in time range)   ibuprofen (ADVIL/MOTRIN) tablet 400 mg (has no administration in time range)   pantoprazole (PROTONIX) EC tablet 40 mg (has no administration in time range)   polyethylene glycol (MIRALAX) Packet 17 g (has no administration in time range)   benzonatate (TESSALON) capsule 100 mg (has no administration in time range)   guaiFENesin (ROBITUSSIN) 20 mg/mL solution 200 mg (has no administration in time range)   LORazepam (ATIVAN) tablet 0.5 mg (has no administration in time range)   azithromycin (ZITHROMAX) tablet 250 mg (has no administration in time  range)   calcium carbonate (TUMS) chewable tablet 1,000 mg (has no administration in time range)   alum & mag hydroxide-simethicone (MAALOX) suspension 30 mL (has no administration in time range)   sodium chloride 0.9% BOLUS 1,000 mL (0 mLs Intravenous Stopped 5/28/25 0114)   predniSONE (DELTASONE) tablet 60 mg (60 mg Oral $Given 5/27/25 2226)   ketorolac (TORADOL) injection 15 mg (15 mg Intravenous $Given 5/28/25 0001)   sodium chloride 0.9% BOLUS 1,000 mL (0 mLs Intravenous Stopped 5/28/25 0156)   iopamidol (ISOVUE-370) solution 500 mL (85 mLs Intravenous $Given 5/28/25 0042)   sodium chloride 0.9 % bag for CT scan flush (90 mLs Intravenous $Given 5/28/25 0042)   cefTRIAXone (ROCEPHIN) 2 g vial to attach to  ml bag for ADULTS or NS 50 ml bag for PEDS (0 g Intravenous Stopped 5/28/25 0304)   azithromycin (ZITHROMAX) tablet 500 mg (500 mg Oral $Given 5/28/25 0219)       Procedures   Procedures     Discussion of Management   Discussed admission with Dr. Lin    ED Course   ED Course as of 05/28/25 0308   Tue May 27, 2025   2210 I obtained history and examined the patient as noted above.    2355 I rechecked the patient and discussed results. She's breathing a little more comfortably, chest pain is not as bad but breathing is about the same.   Wed May 28, 2025   0011 I spoke with the patient regarding her elevated lactate and plan for CT scan, she is amenable to this.     Medical Decision Making / Diagnosis     CMS Diagnoses: IV Antibiotics given and/or elevated Lactate of 5.4 and no sepsis note found - Delete this reminder and enter the sepsis note or '.edcms' before signing chart.>>>The patient has signs of sepsis   Sepsis ED evaluation   The patient has signs of sepsis as evidenced by:  1. Presence of 2 SIRS criteria, suspected infection, AND  2. Organ dysfunction: Lactic Acidosis with value >2.0 due to sepsis    Sepsis Care Initiation: Starting at 2355 on 05/27/25, until specified. Prior to this  documentation, sepsis, severe sepsis, or septic shock was NOT thought to be a significant cause of illness. This order represents the first time infection was seriously considered to be affecting the patient.    Lactic Acid Results:  Recent Labs   Lab Test 05/28/25  0151 05/27/25  2355 03/29/22  0510   LACT 3.4* 5.4* 0.7       3 Hour Bundle 6 Hour Bundle (Reassessment)   Blood Cultures before IV Antibiotics: Yes  Antibiotics given: see below  Prehospital fluid volume (mL):                     Total fluids given (ED +Pre-hospital):  The patient responded to a lesser volume of IV fluids. The initial volume ordered was 2000 mL.    Repeat Lactic Acid Level: Ordered by reflex for 2 hours after initial lactic acid collection.  Vasopressors: MAP>65 after initial IVF bolus, will continue to monitor fluid status and vital signs.  Repeat perfusion exam: I attest to having performed a repeat sepsis exam and assessment of perfusion at 3:09 AM .   BMI Readings from Last 1 Encounters:   05/27/25 48.20 kg/m        Anti-infectives (From admission through now)      Start     Dose/Rate Route Frequency Ordered Stop    05/28/25 0205  cefTRIAXone (ROCEPHIN) 2 g vial to attach to  ml bag for ADULTS or NS 50 ml bag for PEDS         2 g  over 30 Minutes Intravenous ONCE 05/28/25 0200 05/28/25 0304    05/28/25 0205  azithromycin (ZITHROMAX) tablet 500 mg         500 mg Oral ONCE 05/28/25 0200 05/28/25 0219                MIPS   CT for PE was ordered because the patient is high risk for pulmonary embolism.    NICOLE Buenrostro is a 34 year old female with past medical history of asthma and recent partial colectomy due to recurrent diverticulitis who presents to the emergency department with chest pain, shortness of breath, chills, palpitations, productive cough and fatigue.  Upon initial evaluation she is tachycardic and has some soft blood pressures but is afebrile and oxygenating well on room air.  On physical exam she is  tachypneic and has some faint wheezin but overall  has good air movement.  There certainly is an element of hyper ventilation and anxiety that is playing a role in her overall presentation.  However given her hypotension and tachycardia a broad workup was undertaken.  Initial blood work showed some leukocytosis and elevated lactic acidosis to 5.4.  Blood cultures were drawn and patient was given antibiotics.  CT of the chest abdomen pelvis was obtained which shows no signs of pneumonia or significant intra-abdominal pathology to explain her elevated lactate.  Lactate improved following 2 L of IV fluid.  Blood pressure and tachycardia also dramatically improved as well.  The remainder of her workup was reassuring.  However given persistent lactic acidosis and initial presentation will admit for observation for further evaluation and treatment.    Disposition   Admitted     Diagnosis     ICD-10-CM    1. Sepsis, due to unspecified organism, unspecified whether acute organ dysfunction present (H)  A41.9       2. Mild intermittent asthma with exacerbation  J45.21       3. Chest pain, unspecified type  R07.9            Scribe Disclosure:  Claude YOUNG, am serving as a scribe at 10:02 PM on 5/27/2025 to document services personally performed by Jorge Patterson MD based on my observations and the provider's statements to me.        Jorge Patterson MD  05/28/25 [id number]

## 2025-05-28 NOTE — PLAN OF CARE
[facility]    ED Boarding Nurse Handoff Addendum Report:    Date/time: 5/28/2025, 4:28 AM    Activity Level: not oob yet; ind to SBA with prior nurse    Fall Risk: No    Active Infusions: No    Current Meds Due: See MAR    Current care needs: Pain mgt, cardiac monitoring    Oxygen requirements (liters/min and/or FiO2): N/A    Respiratory status: Room air    Vital signs (within last 30 minutes):    Vitals:    05/28/25 0235 05/28/25 0240 05/28/25 0312 05/28/25 0414   BP:   122/75 125/80   BP Location:   Left arm Left arm   Patient Position:   Semi-Mora's Semi-Mora's   Cuff Size:   Adult Regular Adult Regular   Pulse: 69 65 65 65   Resp: 23 16 13 11   Temp:    98.1  F (36.7  C)   TempSrc:    Oral   SpO2: 97% 97% 98% 96%   Weight:           Focused assessment within last 30 minutes:    Pt is alert and oriented. Mild chest pressure persists intermittently. PRN Tylenol administered to manage pain. Breathing is unlabored and pt on room air.    Pt states they ambulated to bathroom with prior nurse; not out of be yet upo assuming pt's care.     Tele: SR. Ongoing monitoring.    Plan: Echocardiogram order in.    /80 (BP Location: Left arm, Patient Position: Semi-Mora's, Cuff Size: Adult Regular)   Pulse 65   Temp 98.1  F (36.7  C) (Oral)   Resp 11   Wt (!) 143.8 kg (317 lb 0.3 oz)   SpO2 96%   BMI 48.20 kg/m       ED Boarding Nurse name: Samantha Campos RN   RECEIVING UNIT ED HANDOFF REVIEW    Above ED Nurse Handoff Report was reviewed: Yes  Reviewed by: Gavino Ramirez RN on May 28, 2025 at 5:20 AM

## 2025-05-29 LAB
BACTERIA SPEC CULT: NORMAL
BACTERIA SPEC CULT: NORMAL

## 2025-06-02 LAB
BACTERIA SPEC CULT: NO GROWTH
BACTERIA SPEC CULT: NO GROWTH

## 2025-06-22 ENCOUNTER — APPOINTMENT (OUTPATIENT)
Dept: GENERAL RADIOLOGY | Facility: CLINIC | Age: 35
End: 2025-06-22
Attending: EMERGENCY MEDICINE
Payer: COMMERCIAL

## 2025-06-22 ENCOUNTER — HOSPITAL ENCOUNTER (EMERGENCY)
Facility: CLINIC | Age: 35
Discharge: HOME | End: 2025-06-22
Attending: EMERGENCY MEDICINE | Admitting: EMERGENCY MEDICINE
Payer: COMMERCIAL

## 2025-06-22 VITALS
RESPIRATION RATE: 18 BRPM | OXYGEN SATURATION: 98 % | DIASTOLIC BLOOD PRESSURE: 98 MMHG | TEMPERATURE: 98.2 F | HEART RATE: 70 BPM | SYSTOLIC BLOOD PRESSURE: 127 MMHG | BODY MASS INDEX: 48.48 KG/M2 | WEIGHT: 293 LBS

## 2025-06-22 DIAGNOSIS — M94.0 COSTOCHONDRITIS: ICD-10-CM

## 2025-06-22 LAB
ANION GAP SERPL CALCULATED.3IONS-SCNC: 13 MMOL/L (ref 7–15)
BASOPHILS # BLD AUTO: 0 10E3/UL (ref 0–0.2)
BASOPHILS NFR BLD AUTO: 0 %
BUN SERPL-MCNC: 17 MG/DL (ref 6–20)
CALCIUM SERPL-MCNC: 8.8 MG/DL (ref 8.8–10.4)
CHLORIDE SERPL-SCNC: 105 MMOL/L (ref 98–107)
CREAT SERPL-MCNC: 0.61 MG/DL (ref 0.51–0.95)
EGFRCR SERPLBLD CKD-EPI 2021: >90 ML/MIN/1.73M2
EOSINOPHIL # BLD AUTO: 0 10E3/UL (ref 0–0.7)
EOSINOPHIL NFR BLD AUTO: 0 %
ERYTHROCYTE [DISTWIDTH] IN BLOOD BY AUTOMATED COUNT: 13.6 % (ref 10–15)
GLUCOSE SERPL-MCNC: 113 MG/DL (ref 70–99)
HCO3 SERPL-SCNC: 20 MMOL/L (ref 22–29)
HCT VFR BLD AUTO: 40.8 % (ref 35–47)
HGB BLD-MCNC: 14 G/DL (ref 11.7–15.7)
IMM GRANULOCYTES # BLD: 0.1 10E3/UL
IMM GRANULOCYTES NFR BLD: 1 %
LYMPHOCYTES # BLD AUTO: 1.9 10E3/UL (ref 0.8–5.3)
LYMPHOCYTES NFR BLD AUTO: 17 %
MCH RBC QN AUTO: 29.2 PG (ref 26.5–33)
MCHC RBC AUTO-ENTMCNC: 34.3 G/DL (ref 31.5–36.5)
MCV RBC AUTO: 85 FL (ref 78–100)
MONOCYTES # BLD AUTO: 0.8 10E3/UL (ref 0–1.3)
MONOCYTES NFR BLD AUTO: 7 %
NEUTROPHILS # BLD AUTO: 8.3 10E3/UL (ref 1.6–8.3)
NEUTROPHILS NFR BLD AUTO: 74 %
NRBC # BLD AUTO: 0 10E3/UL
NRBC BLD AUTO-RTO: 0 /100
NT-PROBNP SERPL-MCNC: 54 PG/ML (ref 0–178)
PLATELET # BLD AUTO: 283 10E3/UL (ref 150–450)
POTASSIUM SERPL-SCNC: 4.1 MMOL/L (ref 3.4–5.3)
RBC # BLD AUTO: 4.8 10E6/UL (ref 3.8–5.2)
SODIUM SERPL-SCNC: 138 MMOL/L (ref 135–145)
TROPONIN T SERPL HS-MCNC: <6 NG/L
WBC # BLD AUTO: 11.2 10E3/UL (ref 4–11)

## 2025-06-22 PROCEDURE — 93005 ELECTROCARDIOGRAM TRACING: CPT

## 2025-06-22 PROCEDURE — 71046 X-RAY EXAM CHEST 2 VIEWS: CPT

## 2025-06-22 PROCEDURE — 99285 EMERGENCY DEPT VISIT HI MDM: CPT | Mod: 25

## 2025-06-22 PROCEDURE — 84484 ASSAY OF TROPONIN QUANT: CPT | Performed by: EMERGENCY MEDICINE

## 2025-06-22 PROCEDURE — 80048 BASIC METABOLIC PNL TOTAL CA: CPT | Performed by: EMERGENCY MEDICINE

## 2025-06-22 PROCEDURE — 85025 COMPLETE CBC W/AUTO DIFF WBC: CPT | Performed by: EMERGENCY MEDICINE

## 2025-06-22 PROCEDURE — 36415 COLL VENOUS BLD VENIPUNCTURE: CPT | Performed by: EMERGENCY MEDICINE

## 2025-06-22 PROCEDURE — 83880 ASSAY OF NATRIURETIC PEPTIDE: CPT | Performed by: EMERGENCY MEDICINE

## 2025-06-22 RX ORDER — LIDOCAINE 50 MG/G
1 PATCH TOPICAL EVERY 24 HOURS
Qty: 9 PATCH | Refills: 0 | Status: SHIPPED | OUTPATIENT
Start: 2025-06-22

## 2025-06-22 RX ORDER — IBUPROFEN 800 MG/1
800 TABLET, FILM COATED ORAL EVERY 8 HOURS PRN
Qty: 21 TABLET | Refills: 0 | Status: SHIPPED | OUTPATIENT
Start: 2025-06-22 | End: 2025-06-29

## 2025-06-22 ASSESSMENT — ACTIVITIES OF DAILY LIVING (ADL)
ADLS_ACUITY_SCORE: 41

## 2025-06-22 ASSESSMENT — COLUMBIA-SUICIDE SEVERITY RATING SCALE - C-SSRS
6. HAVE YOU EVER DONE ANYTHING, STARTED TO DO ANYTHING, OR PREPARED TO DO ANYTHING TO END YOUR LIFE?: NO
1. IN THE PAST MONTH, HAVE YOU WISHED YOU WERE DEAD OR WISHED YOU COULD GO TO SLEEP AND NOT WAKE UP?: NO
2. HAVE YOU ACTUALLY HAD ANY THOUGHTS OF KILLING YOURSELF IN THE PAST MONTH?: NO

## 2025-06-22 NOTE — DISCHARGE INSTRUCTIONS
I recommend that you follow-up with your primary care doctor in the next several days for reevaluation and to discuss your ER visit.  You can take the medications given here as prescribed.  Return to the emergency department at any point for any new or worsening symptoms or for any other concerns.

## 2025-06-22 NOTE — ED TRIAGE NOTES
Pt here sent here from  across the street for evaluation of CP and SOB. Pt was recently diagnosed with pneumonia and started on steroids and ABX. Has been taking ABX as prescribed, however states her CP has been getting progressively worse.

## 2025-06-22 NOTE — ED PROVIDER NOTES
"  Emergency Department Note      History of Present Illness     Chief Complaint   Chest Pain      HPI   Karen Buenrostro is a 34 year old female with a history of anxiety and asthma who presents to the ED for chest pain. The patient reports that she had fluid buildup in her lungs as of Tue and was put on antibiotics. The patient has noticed no symptom relief since being put on antibiotics, and has even noticed additional pain with coughing, pain in the ribcage, and a fluttering heart. She also endorses tender pain to the touch of the midline chest and pain with rotation. The patient also notes that she is asthmatic. The patient was in the UC both 5 days ago and today, but has had symptoms for 7 days. The patient does not feel as if this is pneumonia or asthma as it feels different. She also had a chest CT a few weeks ago and had no significant findings. The patient denies pain or swelling in legs and history of blood clot in legs or lungs.     Independent Historian   None    Review of External Notes   I reviewed the patient's visit 5/28/2025 visit with the Chest CT.     Pulmonary arteries are normal caliber.   \"IMPRESSION:  1.  No pulmonary artery embolus or other acute process within the chest.  2.  Colonic diverticulosis. Partial sigmoidectomy. No definite evidence of postoperative complication or acute diverticulitis.  3.  Diffuse hepatic steatosis, which may be a source of abdominal pain.\"    Past Medical History     Medical History and Problem List   Anxiety   Diverticulitis   Duodenitis determined by biopsy  Fatty liver disease, nonalcoholic   Morbid obesity    Post concussion syndrome   Uncomplicated asthma    Medications   Albuterol  Azithromycin  Robitussin  Micronor  Prilosec  Protonix  Oxycodone  Zoloft  Augmentin  Flagyl  Vibramycin  Beclomethasone  Prednisone  Jencycla  Levothyroxine     Surgical History   Cholecystectomy      Physical Exam     Patient Vitals for the past 24 hrs:   BP Temp Temp src " Pulse Resp SpO2 Weight   06/22/25 1123 (!) 142/97 98.2  F (36.8  C) Oral 90 18 98 % (!) 144.6 kg (318 lb 12.6 oz)     Physical Exam  Nursing note and vitals reviewed.  HENT:   Mouth/Throat: Oropharynx is clear and moist.   Eyes: Conjunctivae and EOM are normal. Pupils are equal, round, and reactive to light.   Cardiovascular: Normal rate, regular rhythm and normal heart sounds.    Pulmonary/Chest: Effort normal and breath sounds normal.   Abdominal: Soft. Bowel sounds are normal.   Musculoskeletal: The patient exhibits no edema.   Lymphadenopathy:    The patient has no cervical adenopathy.   Neurological: The patient is alert.        No facial droop or focal extremity weakness.   Skin: Skin is warm and dry. No rash noted.   Psychiatric: The patient has a normal mood and affect. The patient's behavior is normal.      Diagnostics     Results for orders placed or performed during the hospital encounter of 06/22/25 (from the past 24 hours)   CBC with differential    Narrative    The following orders were created for panel order CBC with differential.  Procedure                               Abnormality         Status                     ---------                               -----------         ------                     CBC with platelets and ...[[id number]]  Abnormal            Final result                 Please view results for these tests on the individual orders.   CBC with platelets and differential   Result Value Ref Range    WBC Count 11.2 (H) 4.0 - 11.0 10e3/uL    RBC Count 4.80 3.80 - 5.20 10e6/uL    Hemoglobin 14.0 11.7 - 15.7 g/dL    Hematocrit 40.8 35.0 - 47.0 %    MCV 85 78 - 100 fL    MCH 29.2 26.5 - 33.0 pg    MCHC 34.3 31.5 - 36.5 g/dL    RDW 13.6 10.0 - 15.0 %    Platelet Count 283 150 - 450 10e3/uL    % Neutrophils 74 %    % Lymphocytes 17 %    % Monocytes 7 %    % Eosinophils 0 %    % Basophils 0 %    % Immature Granulocytes 1 %    NRBCs per 100 WBC 0 <1 /100    Absolute Neutrophils 8.3 1.6 - 8.3  10e3/uL    Absolute Lymphocytes 1.9 0.8 - 5.3 10e3/uL    Absolute Monocytes 0.8 0.0 - 1.3 10e3/uL    Absolute Eosinophils 0.0 0.0 - 0.7 10e3/uL    Absolute Basophils 0.0 0.0 - 0.2 10e3/uL    Absolute Immature Granulocytes 0.1 <=0.4 10e3/uL    Absolute NRBCs 0.0 10e3/uL   Troponin T, High Sensitivity   Result Value Ref Range    Troponin T, High Sensitivity <6 <=14 ng/L   Basic metabolic panel (BMP)   Result Value Ref Range    Sodium 138 135 - 145 mmol/L    Potassium 4.1 3.4 - 5.3 mmol/L    Chloride 105 98 - 107 mmol/L    Carbon Dioxide (CO2) 20 (L) 22 - 29 mmol/L    Anion Gap 13 7 - 15 mmol/L    Urea Nitrogen 17.0 6.0 - 20.0 mg/dL    Creatinine 0.61 0.51 - 0.95 mg/dL    GFR Estimate >90 >60 mL/min/1.73m2    Calcium 8.8 8.8 - 10.4 mg/dL    Glucose 113 (H) 70 - 99 mg/dL   NT-proBNP   Result Value Ref Range    NT-proBNP 54 0 - 178 pg/mL   XR Chest 2 Views    Narrative    EXAM: XR CHEST 2 VIEWS  LOCATION: [facility]  DATE: 6/22/2025    INDICATION: 33yo female with SOB  COMPARISON: 6/17/2025      Impression    IMPRESSION: Negative chest.        ECG  ECG taken at 1117, ECG read at 1121  Normal sinus rhythm  Nonspecific T wave abnormality   No STEMI  Rate 78 bpm. CT interval 136 ms. QRS duration 94 ms. QT/QTc 360/410 ms. P-R-T axes 35 29 20.     Independent Interpretation   CXR: No pneumothorax, infiltrate, cardiomegaly, or mediastinal widening.    ED Course      Medications Administered   Medications - No data to display    Procedures   Procedures     Discussion of Management   None    ED Course   ED Course as of 06/22/25 1411   Sun Jun 22, 2025   1155 I obtained history and examined the patient as noted above.    1357 I rechecked and updated the patient.        Additional Documentation  None    Medical Decision Making / Diagnosis     CMS Diagnoses: None    MIPS   None               MDM   Karen Buenrostro is a 34 year old female with a history of asthma presenting to the emergency department for  chest pain.  On evaluation here, she was overall well-appearing with normal vitals.  Broad differential was considered including ACS, pneumonia, pneumothorax, aortic dissection, pulmonary embolism.  Here her EKG shows no evidence of acute ischemia and her troponin is negative making ACS unlikely.  She recently underwent an extensive workup and had a CT scan done which at that time showed no evidence of pulmonary embolism and here she is not tachycardic or tachypneic or hypoxic and do not feel that this is likely at this time.  Chest x-ray showed no focal infiltrate to suggest a pneumonia, evidence of pneumothorax or widened mediastinum to suggest dissection.  It is reproducible on exam and worse with certain movements so could be a component of costochondritis.  She has no EKG findings to suggest pericarditis.  Feel at this point that she is stable for discharge home with close outpatient follow-up.  She was given strict return precautions which she voiced understanding of.    Disposition   The patient was discharged.     Diagnosis     ICD-10-CM    1. Costochondritis  M94.0            Discharge Medications   New Prescriptions    IBUPROFEN (ADVIL/MOTRIN) 800 MG TABLET    Take 1 tablet (800 mg) by mouth every 8 hours as needed for moderate pain.    LIDOCAINE (LIDODERM) 5 % PATCH    Place 1 patch over 12 hours onto the skin every 24 hours. To prevent lidocaine toxicity, patient should be patch free for 12 hrs daily.         Scribe Disclosure:  I, Tree Verma, am serving as a scribe at 1:23 PM on 6/22/2025 to document services personally performed by Eduard Jain DO based on my observations and the provider's statements to me.        Eduard Jain DO  06/22/25 [id number]

## 2025-06-23 LAB
ATRIAL RATE - MUSE: 78 BPM
DIASTOLIC BLOOD PRESSURE - MUSE: NORMAL MMHG
INTERPRETATION ECG - MUSE: NORMAL
P AXIS - MUSE: 35 DEGREES
PR INTERVAL - MUSE: 136 MS
QRS DURATION - MUSE: 94 MS
QT - MUSE: 360 MS
QTC - MUSE: 410 MS
R AXIS - MUSE: 29 DEGREES
SYSTOLIC BLOOD PRESSURE - MUSE: NORMAL MMHG
T AXIS - MUSE: 20 DEGREES
VENTRICULAR RATE- MUSE: 78 BPM